# Patient Record
Sex: FEMALE | HISPANIC OR LATINO | Employment: FULL TIME | URBAN - METROPOLITAN AREA
[De-identification: names, ages, dates, MRNs, and addresses within clinical notes are randomized per-mention and may not be internally consistent; named-entity substitution may affect disease eponyms.]

---
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
## 2021-01-04 ENCOUNTER — VIRTUAL VISIT (OUTPATIENT)
Dept: PEDIATRICS | Facility: CLINIC | Age: 21
End: 2021-01-04
Payer: COMMERCIAL

## 2021-01-04 DIAGNOSIS — F32.1 CURRENT MODERATE EPISODE OF MAJOR DEPRESSIVE DISORDER WITHOUT PRIOR EPISODE (H): Primary | ICD-10-CM

## 2021-01-04 PROCEDURE — 99214 OFFICE O/P EST MOD 30 MIN: CPT | Mod: 95 | Performed by: PEDIATRICS

## 2021-01-04 ASSESSMENT — PATIENT HEALTH QUESTIONNAIRE - PHQ9: SUM OF ALL RESPONSES TO PHQ QUESTIONS 1-9: 12

## 2021-01-04 NOTE — PROGRESS NOTES
Amie Santiago is a 20 year old female who is being evaluated via a billable video visit.      How would you like to obtain your AVS? MyChart  If the video visit is dropped, the invitation should be resent by: Text to cell phone: [phone]  Will anyone else be joining your video visit? No      Video Start Time: 9:50   Combination of video and phone visit due to some internet problems    Assessment & Plan     Current moderate episode of major depressive disorder without prior episode (H)  In discussion with Amie, her symptoms are quite consistent with depression and her PHQ score is in the moderate range.  She is not having suicidal thoughts, but her depression is significantly her daily functioning.    We discussed counseling and medication, and Irene would like to try medication.  She may have access to student health when she goes back to college in person, and she's open to that.    - FLUoxetine (PROZAC) 20 MG capsule; Take 1 capsule (20 mg) by mouth daily    Iron deficiency:  This has been an issue in the past, will plan to recheck ferritin at next in person visit.                  40 minutes spent on the date of the encounter doing chart review, history and exam, documentation and further activities as noted above               Return in about 1 month (around 2/4/2021) for Medication Follow up.    Letty Echevarria MD  [facility]     Amie Santiago is a 20 year old female who presents to clinic today for the following health issues     HPI     Here today to discuss ongoing fatigue and ,malaise.  We talked through her past iron deficiency and she said she did start back up on iron supplement.    Irene brought up that she has also wondered if she might have depression--she has been filling very low energy, low mood, and lack of interest in her usual activities.  She has noticed this over the past several weeks, and we went through her PHQ together..   OT ACUTE Treatment Session    Pt seen on 4  nursing unit.                                                          Frequency Comments: XMWF (OBS)     Admitting complaint:: Urinary tract infection without hematuria, site unspecified [N39.0]  Chronic right-sided thoracic back pain [M54.6, G89.29]                                                                                         Precautions  Cardiac Precautions: Yes (sternal precautions as of 6/6/18) (07/23/18 1025)      ASSESSMENT:  Patient continues to state she is unable to complete toileting clean-up, bathing LB, dressing LB, stating these tasks will increase her pain. States her need to go back to sub acute facility from which she was admitted from.     Treatment today focused on progressing functional mobility, except reaching anterior/posterior LB ADLs/toileting.   Current overall ADL status is maximal assist  Current functional mobility for ADL and Instrumental-ADL tasks is supervision using 2 ww.  Patient  displays  fair progress demonstrated by self limiting behavior with reach and completion of ADLs..    Limitations at this time include cognitive deficits, pain, medical status and carryover of techniques taught from one session to the next. Denies pain this session but will also not complete LB ADLs.Patient will benefit from further skilled OT for continued training with above to help the patient meet goal of maximizing participation in LB ADLs, recommended toilet tongs, patient declined.  Discussed patient goal of sub acute rehabilitation, discharge planning options and therapy progress made to date with Patient.       RECOMMENDATIONS FOR DISCHARGE:  Recommendations for Discharge: OT: Sub-acute nursing home;Other (comment) (from sub acute, does not complete own pericares, clean-up ) (07/27/18 1342)    OT Identified Barriers to Discharge: currently refusing own perineal/buttock clean-up due to flank pain, declines adaptive equipment, toilet tongs,              Review of Systems         Objective         Vitals:  No vitals were obtained today due to virtual visit.    Physical Exam   GENERAL: Healthy, alert and no distress  EYES: Eyes grossly normal to inspection.  No discharge or erythema, or obvious scleral/conjunctival abnormalities.  RESP: No audible wheeze, cough, or visible cyanosis.  No visible retractions or increased work of breathing.    SKIN: Visible skin clear. No significant rash, abnormal pigmentation or lesions.  NEURO: Cranial nerves grossly intact.  Mentation and speech appropriate for age.  PSYCH: Mentation appears normal, affect normal/bright, judgement and insight intact, normal speech and appearance well-groomed.                Video-Visit Details    Type of service:  Video Visit    Video End Time:10:10    Originating Location (pt. Location): Home    Distant Location (provider location):  [facility]'S     Platform used for Video Visit: [employer]     insists on assistance, states she can't and that she needs to go back to sub acute      PT/OT Mobility Equipment for Discharge: Owns 4 wheeled walker and cane (07/26/18 0850)  PT/OT ADL Equipment for Discharge: Do not anticipate needs.  (07/24/18 1600)    Assistance needed when returning home:   Discussed with patient/caregiver who acknowledged understanding of current recommendations for safe home discharge plan. Based on current level of assistance, recommendations are: sub acute rehabilitation. Help to be provided by staff.    ICU Mobility Assesment (PERME):         PLAN: Continue skilled OT, including the following Treatment Interventions: ADL retraining;Functional transfer training;Endurance training;Cognitive reorientation;Patient/Family training;Equipment eval/education;Compensatory technique education;Fine motor coordination activities (07/27/18 1342)   Treatment Plan for Next Session: household distance to toilet, perineal area participation clean-up/bathing, LB ADLs          EDUCATION:   On this date, the patient was educated on see above and importance of self participation.    The response to education was: Needs reinforcement                                                    SUBJECTIVE:     Subjective: Patient agreed to session.  (07/27/18 1342)  Subjective/Objective Comments: RN, EMIGDIO. Patient up after ADLs ambulating in hallway.  (07/27/18 1342)    OBJECTIVE:Basic Lines: Capped IV (07/27/18 1342)  Safety Measures: Other (comment) (no alarms, call light in place, recliner) (07/27/18 1342)    RN reported Duran Fall Scale Score: 60       Last 24 hours of Functional Data     ADLs  Self Cares/ADL's  Grooming Assistance: Supervision;Standing at sink (07/27/18 1342)  Grooming/Oral Hygiene Deficit: Wash/dry hands;Other (Comment) (s/p toileting ) (07/27/18 1342)  Bathing Assistance: Maximal Assist (Max) (07/27/18 1342)  Bathing Deficit: Perineal area;Buttocks;Other (Comment);Increased time to complete (s/p  toileting ) (07/27/18 1342)  Lower Body Clothing Assistance: Maximal Assist (Max) (07/27/18 1342)  Footwear Assistance: Moderate Assist (Mod) (07/27/18 1342)  Lower Body Dressing Deficit: Don/doff R sock;Don/doff L sock;Thread RLE into underwear;Thread LLE into underwear;Pull up over hips;Requires assistive device for steadying;Increased time to complete (07/27/18 1342)  Toileting Assistance: Maximal Assist (Max) (07/27/18 1342)  Toileting Deficit: Perineal hygiene;Clothing management down;Clothing management up;Increased time to complete (decreased reach, doesn't report flank pain refuses to reach ) (07/27/18 1342)  Self Cares/ADL's Comments #2: limited only to reach anterior to perineal area, posterior to buttock, declining adaptive equipment, toilet tongs. Requires ADL assist. Following assisted toileting clean-up, standing at sink, able to wash hands, demonstrates tolerance, balance and safety, standing with 2 ww at sink, toilet (07/27/18 1342)    Household mobility  Household Mobility  Sit to Stand: Supervision (07/27/18 1342)  Stand to Sit: Supervision (07/27/18 1342)  Toilet Transfers: Supervision (07/27/18 1342)  Transfer Equipment: 2 ww, gait belt  (07/27/18 1342)  Sitting - Static: Independent (07/27/18 1342)  Sitting - Dynamic: Modified Independent (07/27/18 1342)  Standing - Static: Modified Independent (07/27/18 1342)  Standing - Dynamic: Supervision (07/27/18 1342)  Household Mobility Comments #1: Patient not limited to functional mobility using  2 ww, limited only to reach anterior to perineal area, posterior to buttock, declining adaptive equipment, toilet tongs. Requires ADL assist. Following assisted toileting clean-up, standing at sink, able to wash hands, additional ambulation in hallway 250 feet, no loss of balance and demonstrates tolerance/safety.  (07/27/18 1342)    Home Management       Tolerance  OT Activity Tolerance  Activity Tolerance: 1:1 Activity to rest (07/27/18 1342)  Activity  Tolerance Comments: limited self limiting  (07/27/18 1342)    Cognition       Patient's Personal Goal: Return to home, less pain.  (07/22/18 1255)    Therapy Goals:    Goals  Short Term Goals to Be Reviewed On: 07/29/18 (07/22/18 1255)  Short Term Goals Are The Same as Discharge Goals: Yes (07/22/18 1255)  Goal Agreement: Patient agrees with goals and treatment plan (07/22/18 1255)  Grooming Discharge Goal 1: Modif indep standing at the sink (07/22/18 1255)  Bathing Discharge Goal 1: Shower with modif indep (07/22/18 1255)  Dressing Discharge Goal 1: UB dressing with modif indep (07/22/18 1255)  Dressing Discharge Goal 2: LB dressing with modif indep (07/22/18 1255)  Toileting Discharge Goal 1: Modif indep (07/22/18 1255)  Home Setting Transfer Discharge Goal 1: Modif indep  (07/22/18 1255)        Total Treatment Time:  OT Time Spent: 40 minutes (07/27/18 1342)    See OT flowsheet for full details regarding the OT therapy provided.

## 2021-01-14 ENCOUNTER — TELEPHONE (OUTPATIENT)
Dept: PEDIATRICS | Facility: CLINIC | Age: 21
End: 2021-01-14

## 2021-01-14 NOTE — TELEPHONE ENCOUNTER
Could you please call Irene and get her scheduled for an in person visit with me sometime in early to mid February?  Follow up depression.    Thanks,    Letty

## 2021-02-08 ENCOUNTER — VIRTUAL VISIT (OUTPATIENT)
Dept: PEDIATRICS | Facility: CLINIC | Age: 21
End: 2021-02-08
Payer: COMMERCIAL

## 2021-02-08 DIAGNOSIS — F32.4 MAJOR DEPRESSIVE DISORDER WITH SINGLE EPISODE, IN PARTIAL REMISSION (H): ICD-10-CM

## 2021-02-08 PROCEDURE — 99213 OFFICE O/P EST LOW 20 MIN: CPT | Mod: 95 | Performed by: PEDIATRICS

## 2021-02-08 NOTE — PROGRESS NOTES
Irene is a 20 year old who is being evaluated via a billable video visit.      How would you like to obtain your AVS? MyChart  If the video visit is dropped, the invitation should be resent by: Send to e-mail at: [email or web address]  Will anyone else be joining your video visit? No      Video Start Time: 10:40  Assessment & Plan     Major depressive disorder with single episode, in partial remission (H)    Major depressive disorder with single episode, in partial remission (H)  Feels that the Prozac has been really helpful.  Energy is much improved, she's sleeping better and feels happier.  Also back to college and starting up with soccer again, which she really enjoys.    Will plan to continue current dose and have another visit in 2 to 3 months.  Advised that she connect with a counselor in student health at her college.              Letty Echevarria MD  Pager [phone]                  20 minutes spent on the date of the encounter doing chart review, history and exam, documentation and further activities as noted above               Return in about 2 months (around 4/8/2021) for Medication Follow up.    Letty Echevarria MD  [facility]    [facility]     Irene is a 20 year old who presents to clinic today for the following health issues     HPI       Medication Followup of prozac for depression    Taking Medication as prescribed: yes    Side Effects:  None    Medication Helping Symptoms:  yes     Started on Prozac about 4 weeks ago after several weeks of low energy, anhedonia, sleep disruption.    Irene notes that things are much improved, she feels the medication is really helping her to feel better with improved energy, mood, and sleep.        Review of Systems         Objective           Vitals:  No vitals were obtained today due to virtual visit.    Physical Exam   GENERAL: Healthy, alert and no distress  EYES: Eyes grossly normal to inspection.  No discharge or erythema, or  obvious scleral/conjunctival abnormalities.  RESP: No audible wheeze, cough, or visible cyanosis.  No visible retractions or increased work of breathing.    SKIN: Visible skin clear. No significant rash, abnormal pigmentation or lesions.  NEURO: Cranial nerves grossly intact.  Mentation and speech appropriate for age.  PSYCH: Mentation appears normal, affect normal/bright, judgement and insight intact, normal speech and appearance well-groomed.                Video-Visit Details    Type of service:  Video Visit    Video End Time:10:48 AM    Originating Location (pt. Location): Home    Distant Location (provider location):  [facility]     Platform used for Video Visit: [employer]

## 2021-02-08 NOTE — ASSESSMENT & PLAN NOTE
Feels that the Prozac has been really helpful.  Energy is much improved, she's sleeping better and feels happier.  Also back to college and starting up with soccer again, which she really enjoys.    Will plan to continue current dose and have another visit in 2 to 3 months.  Advised that she connect with a counselor in student health at her college.

## 2021-04-28 ENCOUNTER — VIRTUAL VISIT (OUTPATIENT)
Dept: PEDIATRICS | Facility: CLINIC | Age: 21
End: 2021-04-28
Payer: COMMERCIAL

## 2021-04-28 DIAGNOSIS — H57.9 DISCOLORATION OF EYE: Primary | ICD-10-CM

## 2021-04-28 DIAGNOSIS — F32.5 MAJOR DEPRESSIVE DISORDER WITH SINGLE EPISODE, IN FULL REMISSION (H): ICD-10-CM

## 2021-04-28 PROCEDURE — 99213 OFFICE O/P EST LOW 20 MIN: CPT | Mod: 95 | Performed by: PEDIATRICS

## 2021-04-28 ASSESSMENT — PATIENT HEALTH QUESTIONNAIRE - PHQ9
SUM OF ALL RESPONSES TO PHQ QUESTIONS 1-9: 0
5. POOR APPETITE OR OVEREATING: NOT AT ALL

## 2021-04-28 ASSESSMENT — ANXIETY QUESTIONNAIRES
2. NOT BEING ABLE TO STOP OR CONTROL WORRYING: NOT AT ALL
6. BECOMING EASILY ANNOYED OR IRRITABLE: NOT AT ALL
1. FEELING NERVOUS, ANXIOUS, OR ON EDGE: NOT AT ALL
5. BEING SO RESTLESS THAT IT IS HARD TO SIT STILL: NOT AT ALL
7. FEELING AFRAID AS IF SOMETHING AWFUL MIGHT HAPPEN: NOT AT ALL
IF YOU CHECKED OFF ANY PROBLEMS ON THIS QUESTIONNAIRE, HOW DIFFICULT HAVE THESE PROBLEMS MADE IT FOR YOU TO DO YOUR WORK, TAKE CARE OF THINGS AT HOME, OR GET ALONG WITH OTHER PEOPLE: NOT DIFFICULT AT ALL
3. WORRYING TOO MUCH ABOUT DIFFERENT THINGS: NOT AT ALL
GAD7 TOTAL SCORE: 0

## 2021-04-28 NOTE — PROGRESS NOTES
Irene is a 20 year old who is being evaluated via a billable video visit.      How would you like to obtain your AVS? MyChart  If the video visit is dropped, the invitation should be resent by: Text to cell phone: [phone]  Will anyone else be joining your video visit? No    Video Start Time: 3:50     Start 3:50, end 4:04    Assessment & Plan   Problem List Items Addressed This Visit        Behavioral    Major depressive disorder with single episode, in full remission (H)     Much improved after taking Prozac for a few months, getting back to college.  Stopped Prozac due to vivid dreams.  Feelilng much better now and would like to stay off of medication and continue to monitor symptoms.            Other    Discoloration of eye - Primary    Relevant Orders    EYE ADULT REFERRAL         Noted a dark spot on the white of her eye and would like to have it checked out.  She hasn't noticed it previously.                 Return in about 3 months (around 7/28/2021) for ADHD medication check.    Letty Echevarria MD  [facility]    [facility]   Irene is a 20 year old who presents for the following health issues     HPI     Depression Followup    How are you doing with your depression since your last visit? Worsened taking both Adderall and Prozac together, was noticing vivid dreams and though it might have been from the Prozac so stopped taking it and now feeling much better.    Are you having other symptoms that might be associated with depression? No    Have you had a significant life event?  No     Are you feeling anxious or having panic attacks?   Yes:  weird and off    Do you have any concerns with your use of alcohol or other drugs? No    Social History     Tobacco Use     Smoking status: Never Smoker     Smokeless tobacco: Never Used   Substance Use Topics     Alcohol use: Yes     Alcohol/week: 0.0 standard drinks     Drug use: No     PHQ 7/22/2019 1/4/2021 4/28/2021   PHQ-9 Total Score 0 12 0    Q9: Thoughts of better off dead/self-harm past 2 weeks Not at all Not at all Not at all     DAMIÁN-7 SCORE 2/27/2019 4/28/2021   Total Score 1 0         Suicide Assessment Five-step Evaluation and Treatment (SAFE-T)      How many servings of fruits and vegetables do you eat daily?  2-3    On average, how many sweetened beverages do you drink each day (Examples: soda, juice, sweet tea, etc.  Do NOT count diet or artificially sweetened beverages)?   0    How many days per week do you exercise enough to make your heart beat faster? 7    How many minutes a day do you exercise enough to make your heart beat faster? 60 or more    How many days per week do you miss taking your medication? 0        Review of Systems         Objective           Vitals:  No vitals were obtained today due to virtual visit.    Physical Exam   GENERAL: Healthy, alert and no distress  EYES: Eyes grossly normal to inspection.  No discharge or erythema, or obvious scleral/conjunctival abnormalities.  RESP: No audible wheeze, cough, or visible cyanosis.  No visible retractions or increased work of breathing.    SKIN: Visible skin clear. No significant rash, abnormal pigmentation or lesions.  NEURO: Cranial nerves grossly intact.  Mentation and speech appropriate for age.  PSYCH: Mentation appears normal, affect normal/bright, judgement and insight intact, normal speech and appearance well-groomed.                Video-Visit Details    Type of service:  Video Visit    Video End Time:4:04    Originating Location (pt. Location): Home    Distant Location (provider location):  [facility]     Platform used for Video Visit: [email or web address]

## 2021-04-29 ENCOUNTER — TELEPHONE (OUTPATIENT)
Dept: OPHTHALMOLOGY | Facility: CLINIC | Age: 21
End: 2021-04-29

## 2021-04-29 ASSESSMENT — ANXIETY QUESTIONNAIRES: GAD7 TOTAL SCORE: 0

## 2021-04-29 NOTE — TELEPHONE ENCOUNTER
Spoke to pt at 1630    Right eye discoloration on white part of eye    No eye pain    No vision changes    Noticed recently by friend    Pt in [facility] may 8th    Scheduled in continuity may 12th    Note to  to send new patient packet--patient has main clinic number if needs to make adjustments to scheduling      Joss Menard RN 4:32 PM 04/29/21        [facility] Call Center    Phone Message    May a detailed message be left on voicemail: no     Reason for Call: Question regarding specialist protocol  Please follow protocols- only utilize this documentation for questions or concerns that are not clear in the protocol.  Contact clinic directly to clarify question(s) via phone or Judobaby message.   Was Clinic Available: no  Question regarding protocol:  Discoloration of eye  Is there a referral for the requested specialist/specialty? yes  Name of referring provider: Letty Echevarria MD   Location of referring provider: [facility]      Action Taken: Message routed to:  [facility] [[id number]]    Travel Screening: Not Applicable

## 2021-05-05 NOTE — PATIENT INSTRUCTIONS
Try the short acting (5 mg Adderall) to help with homework after school or on weekends.   05-May-2021 12:05

## 2021-05-12 ENCOUNTER — OFFICE VISIT (OUTPATIENT)
Dept: OPHTHALMOLOGY | Facility: CLINIC | Age: 21
End: 2021-05-12
Attending: OPHTHALMOLOGY
Payer: COMMERCIAL

## 2021-05-12 DIAGNOSIS — H15.89: Primary | ICD-10-CM

## 2021-05-12 DIAGNOSIS — H18.011: Primary | ICD-10-CM

## 2021-05-12 DIAGNOSIS — H02.823 EPIDERMAL INCLUSION CYST OF EYELID, RIGHT: ICD-10-CM

## 2021-05-12 PROCEDURE — G0463 HOSPITAL OUTPT CLINIC VISIT: HCPCS

## 2021-05-12 PROCEDURE — 92002 INTRM OPH EXAM NEW PATIENT: CPT | Mod: GC | Performed by: STUDENT IN AN ORGANIZED HEALTH CARE EDUCATION/TRAINING PROGRAM

## 2021-05-12 ASSESSMENT — SLIT LAMP EXAM - LIDS: COMMENTS: NORMAL

## 2021-05-12 ASSESSMENT — VISUAL ACUITY
OD_SC: 20/20
OS_SC: 20/20
METHOD: SNELLEN - LINEAR
METHOD_MR: DEFERS

## 2021-05-12 ASSESSMENT — TONOMETRY
IOP_METHOD: ICARE
OD_IOP_MMHG: 15
OS_IOP_MMHG: 14

## 2021-05-12 ASSESSMENT — EXTERNAL EXAM - RIGHT EYE: OD_EXAM: NORMAL

## 2021-05-12 ASSESSMENT — CUP TO DISC RATIO
OD_RATIO: 0.1
OS_RATIO: 0.1

## 2021-05-12 ASSESSMENT — EXTERNAL EXAM - LEFT EYE: OS_EXAM: NORMAL

## 2021-05-12 ASSESSMENT — CONF VISUAL FIELD
OS_NORMAL: 1
OD_NORMAL: 1

## 2021-05-12 NOTE — NURSING NOTE
Chief Complaints and History of Present Illnesses   Patient presents with     Consult For     Chief Complaint(s) and History of Present Illness(es)     Consult For     Laterality: right eye    Onset: 2 weeks ago              Comments     Conj Lesion RE-Pt. states that her friend noticed a brown spot on the corner of RE about 2 weeks ago. No change in VA BE. No pain BE.   Shira Diaz COT 1:04 PM May 12, 2021

## 2021-05-12 NOTE — PROGRESS NOTES
I have confirmed the patient's and reviewed Past Medical History, Past Surgical History, Social History, Family History, Problem List, Medication List and agree with Tech note.    CC: Right eye brown spot on white part of eye    HPI: Amie Santiago is a 20 year old female who presents for evaluation of a right eye brown spot that her friend brought to her attention 3 weeks ago. It has been stable in size. No redness, tearing, vision changes, or eye pain.    POHx: left anisocoria   PMHx:  RAHUL, ADD, seasonal allergies  Family Hx: unknown (adopted)  Social Hx: Student at [employer] in [place]  Medications: Reviewed in EMR    Amie Santiago is a 20 year old female with the following diagnoses:   1. 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 Epidermal inclusion cyst of eyelid, right        1) Complexion melanosis, right eye:  2) Epidermal inclusion cyst, right medial lower lid  The patient has a 2.5 x 1.0 mm area of right eye temporal complexion melanosis and also a tiny medial right lower eyelid epidermal inclusion cyst.   No concerning features of either lesion. Pt was adopted from [facility].  - Reassurance and return precautions provided.     Pt had normal eye exam ~1 year ago with local eye provider. Saw Dr. Nogueira in 2018 as part of hearing deficit workup. Pt's eye exam was normal then w/o retinopathy or bone spicules, but did find incidental left anisocoria. No anisocoria on today's exam.    Patient disposition:   Return if changes are noted or symptoms develop. Otherwise continue regular eye exams with local eye provider or here if preferred.         Pete Sheldon MD  Department of Ophthalmology - PGY4      Teaching statement:  Complete documentation of historical and exam elements from today's encounter can be found in the full encounter summary report (not reduplicated in this progress note). I personally obtained the chief complaint(s) and history of present illness.  I  confirmed and edited as necessary the review of systems, past medical/surgical history, family history, social history, and examination findings as documented by others; and I examined the patient myself. I personally reviewed the relevant tests, images, and reports as documented above.     I formulated and edited as necessary the assessment and plan and discussed the findings and management plan with the patient and family.    Karis Greene MD  Comprehensive Ophthalmology & Ocular Pathology  Department of Ophthalmology and Visual Neurosciences  [email or web address]  Pager [phone]

## 2021-05-30 PROBLEM — F32.5 MAJOR DEPRESSIVE DISORDER WITH SINGLE EPISODE, IN FULL REMISSION (H): Status: ACTIVE | Noted: 2021-02-08

## 2021-05-30 PROBLEM — H57.9 DISCOLORATION OF EYE: Status: ACTIVE | Noted: 2021-05-30

## 2021-05-30 NOTE — ASSESSMENT & PLAN NOTE
Much improved after taking Prozac for a few months, getting back to college.  Stopped Prozac due to vivid dreams.  Feelilng much better now and would like to stay off of medication and continue to monitor symptoms.

## 2021-09-25 ENCOUNTER — HEALTH MAINTENANCE LETTER (OUTPATIENT)
Age: 21
End: 2021-09-25

## 2021-10-19 ENCOUNTER — OFFICE VISIT (OUTPATIENT)
Dept: URGENT CARE | Facility: URGENT CARE | Age: 21
End: 2021-10-19
Payer: COMMERCIAL

## 2021-10-19 VITALS
BODY MASS INDEX: 21.16 KG/M2 | DIASTOLIC BLOOD PRESSURE: 67 MMHG | HEIGHT: 62 IN | TEMPERATURE: 98.2 F | WEIGHT: 115 LBS | HEART RATE: 78 BPM | SYSTOLIC BLOOD PRESSURE: 118 MMHG | OXYGEN SATURATION: 100 %

## 2021-10-19 DIAGNOSIS — R07.0 THROAT PAIN: Primary | ICD-10-CM

## 2021-10-19 LAB
DEPRECATED S PYO AG THROAT QL EIA: NEGATIVE
GROUP A STREP BY PCR: NOT DETECTED

## 2021-10-19 PROCEDURE — 87651 STREP A DNA AMP PROBE: CPT | Performed by: NURSE PRACTITIONER

## 2021-10-19 PROCEDURE — 99213 OFFICE O/P EST LOW 20 MIN: CPT | Performed by: NURSE PRACTITIONER

## 2021-10-19 RX ORDER — LORATADINE 10 MG/1
10 TABLET ORAL DAILY
COMMUNITY
End: 2024-02-14

## 2021-10-19 ASSESSMENT — MIFFLIN-ST. JEOR: SCORE: 1249.89

## 2021-10-19 NOTE — PATIENT INSTRUCTIONS
Rapid strep test today is negative.   Your throat culture is pending. Urgent Care will call if positive results to start antibiotics at that time; No call if the culture is negative.  Drink plenty of fluids and rest.  May use salt water gargles- about 8 oz warm water with about 1 teaspoon salt  Sucrets and Cepacol spray are over the counter medications that numb the throat.  Over the counter pain relievers such as tylenol or ibuprofen may be used as needed.  Honey has been shown to be helpful in cough management and is soothing to a sore throat. May add to lemon tea.  Please follow up with primary care provider if not improving, worsening or new symptoms.

## 2021-10-19 NOTE — PROGRESS NOTES
"Chief Complaint   Patient presents with     Urgent Care     Pt in clinic to have eval for sore throat.     Throat Pain     SUBJECTIVE:  Amie Santiago is a 20 year old female presenting with sore throat and myalgias for 1 day. Symptoms are moderate and stable.    Past Medical History:   Diagnosis Date     Hoarseness      Nasal congestion      Sore throat      ferrous sulfate (SLO-FE) 142 (45 Fe) MG TBCR, Take 1 tablet (142 mg) by mouth daily  loratadine (CLARITIN) 10 MG tablet, Take 10 mg by mouth daily  multivitamin, therapeutic with minerals (MULTI-VITAMIN) TABS tablet, Take 1 tablet by mouth daily  VITAMIN D, CHOLECALCIFEROL, PO, Take by mouth daily  albuterol (VENTOLIN HFA) 108 (90 Base) MCG/ACT inhaler, Inhale 2 puffs into the lungs every 4 hours as needed for wheezing (can use 15 mintues before exercise to prevent shortness of breath) (Patient not taking: Reported on 8/19/2020)  amphetamine-dextroamphetamine (ADDERALL XR) 15 MG 24 hr capsule, Take 15 mg by mouth daily (Patient not taking: Reported on 10/19/2021)  ibuprofen (ADVIL/MOTRIN) 600 MG tablet, Take 600 mg by mouth every 6 hours as needed for moderate pain (Patient not taking: Reported on 10/19/2021)  order for DME, Equipment being ordered: left arm sling (Patient not taking: Reported on 5/10/2019)    No current facility-administered medications on file prior to visit.    Social History     Tobacco Use     Smoking status: Never Smoker     Smokeless tobacco: Never Used   Substance Use Topics     Alcohol use: Yes     Alcohol/week: 0.0 standard drinks     Allergies   Allergen Reactions     Gluten Meal      Review of Systems   All systems negative except for those listed above in HPI.    OBJECTIVE:   /67   Pulse 78   Temp 98.2  F (36.8  C) (Oral)   Ht 1.575 m (5' 2\")   Wt 52.2 kg (115 lb)   SpO2 100%   BMI 21.03 kg/m       Physical Exam  Constitutional:       General: She is not in acute distress.     Appearance: She is well-developed. " She is not ill-appearing, toxic-appearing or diaphoretic.   HENT:      Head: Normocephalic and atraumatic.      Nose: No congestion or rhinorrhea.      Mouth/Throat:      Pharynx: No oropharyngeal exudate or posterior oropharyngeal erythema (pink).   Eyes:      Conjunctiva/sclera: Conjunctivae normal.      Pupils: Pupils are equal, round, and reactive to light.   Cardiovascular:      Rate and Rhythm: Normal rate.   Pulmonary:      Effort: Pulmonary effort is normal. No respiratory distress.      Breath sounds: No stridor. No wheezing.   Musculoskeletal:         General: Normal range of motion.      Cervical back: Normal range of motion and neck supple.   Lymphadenopathy:      Cervical: No cervical adenopathy.   Skin:     General: Skin is warm.      Capillary Refill: Capillary refill takes less than 2 seconds.      Findings: No rash.   Neurological:      General: No focal deficit present.      Mental Status: She is alert and oriented to person, place, and time.   Psychiatric:         Mood and Affect: Mood normal.         Behavior: Behavior normal.       Results for orders placed or performed in visit on 10/19/21   Streptococcus A Rapid Screen w/Reflex to PCR - Clinic Collect     Status: Normal    Specimen: Throat; Swab   Result Value Ref Range    Group A Strep antigen Negative Negative     ASSESSMENT:    ICD-10-CM    1. Throat pain  R07.0 Streptococcus A Rapid Screen w/Reflex to PCR - Clinic Collect     PLAN:     Rapid strep test today is negative.   Your throat culture is pending. Urgent Care will call if positive results to start antibiotics at that time; No call if the culture is negative.  Drink plenty of fluids and rest.  May use salt water gargles- about 8 oz warm water with about 1 teaspoon salt  Sucrets and Cepacol spray are over the counter medications that numb the throat.  Over the counter pain relievers such as tylenol or ibuprofen may be used as needed.  Honey has been shown to be helpful in cough  management and is soothing to a sore throat. May add to lemon tea.  Please follow up with primary care provider if not improving, worsening or new symptoms.    Follow up with primary care provider with any problems, questions or concerns or if symptoms worsen or fail to improve. Patient agreed to plan and verbalized understanding.    PADMAJA [facility]

## 2022-01-14 ENCOUNTER — OFFICE VISIT (OUTPATIENT)
Dept: FAMILY MEDICINE | Facility: CLINIC | Age: 22
End: 2022-01-14
Payer: COMMERCIAL

## 2022-01-14 VITALS
BODY MASS INDEX: 19.57 KG/M2 | TEMPERATURE: 97.4 F | SYSTOLIC BLOOD PRESSURE: 101 MMHG | HEART RATE: 64 BPM | WEIGHT: 107 LBS | DIASTOLIC BLOOD PRESSURE: 72 MMHG | OXYGEN SATURATION: 100 %

## 2022-01-14 DIAGNOSIS — S06.0X0A CONCUSSION WITHOUT LOSS OF CONSCIOUSNESS, INITIAL ENCOUNTER: Primary | ICD-10-CM

## 2022-01-14 PROCEDURE — 99213 OFFICE O/P EST LOW 20 MIN: CPT | Performed by: FAMILY MEDICINE

## 2022-01-14 ASSESSMENT — PATIENT HEALTH QUESTIONNAIRE - PHQ9: SUM OF ALL RESPONSES TO PHQ QUESTIONS 1-9: 1

## 2022-01-14 NOTE — PATIENT INSTRUCTIONS
"  Patient Education   Concussion Discharge Instructions  You were seen today for signs of a concussion. The symptoms will vary, depending on the nature of your injury and your health. You may have: headache, confusion, nausea (feel sick to your stomach), vomiting (throwing up) and problems with memory, concentrating or sleep. You may feel dizzy, irritable, and tired.   Children and teens may need help from their parents, teachers and coaches to watch for symptoms as they recover.  Follow-up  It is important for you to see a doctor for follow-up care to see how you are recovering. Please see your primary doctor within the next 5 to 7 days. You may have also been referred to the Concussion  service. They will contact you and arrange a follow-up visit if needed. If you need help sooner, you may call them at [phone].  Warning signs  Call your doctor or come back to Emergency if you suddenly have any of these symptoms:    Headaches that get worse    Feeling more and more drowsy    You keep repeating yourself    Strange behavior    Seizures    Repeat vomiting (throwing up)    Trouble walking    Growing confusion    Feeling more irritable    Neck pain that gets worse    Slurred speech    Weakness or numbness    Loss of consciousness    Fluid or blood coming from ears or nose  Self-care    Get lots of rest and get enough sleep at night. Take daytime naps or rest if you feel tired.    Limit physical activity and \"thinking\" activities. These can make symptoms worse.  ? Physical activity includes gym, sports, weight training, running, exercise and heavy lifting.  ? Thinking activities include homework, class work, job-related work and screen time (phone, computer, tablet, TV and video games).    Stick to a healthy diet and drink lots of fluids.    As symptoms improve, you may slowly return to your daily activities. If symptoms get worse   or return, reduce your activity.    Know that it is normal to feel sad and " frustrated when you do not feel right and are less active.  Going back to work    Your care team will tell you when you are ready to return to work.    Limit the amount of work you do soon after your injury. This may speed healing. Take breaks if your symptoms get worse. You should also reduce your physical activity as well as activities that require a lot of thinking until you see your doctor.    You may need shorter work days and a lighter workload.    Avoid heavy lifting, working with machinery, driving and working at heights until your symptoms are gone or you are cleared by a doctor.  Returning to sports    Never return to play if you have any symptoms. A full recovery will reduce the chances of getting hurt again. Remember, it is better to miss one or two games than a whole season.    You should rest from all physical activity until you see your doctor. Generally, if all symptoms have completely cleared, your doctor can help guide you to slowly return to sports. If symptoms return or worsen, stop the activity and see your doctor.    Important: If you are in an organized sport and under age 18, you will need written consent from a healthcare provider before you return to sports. Typically, this will be your primary care or sports medicine doctor. Please make an appointment.  Going back to school    If you are still having symptoms, you may need extra help at school.    Tell your teachers and school nurse about your injury and symptoms. Ask them to watch for problems with learning, memory and concentrating. Symptoms may get worse when you do schoolwork, and you may become more irritable.    You may need shorter school days, a reduced workload, and to postpone testing.    Do not drive or take gym class (physical activity) until cleared by a doctor.    For informational purposes only. Not to replace the advice of your health care provider.   2009 [employer]. Used with permission.  "This form is adapted from the \"Heads Up: Brain Injury in Your Practice\" tool kit developed by the Centers for Disease Control and Prevention (CDC). All rights reserved. [facility] [facility]. [employer] 533743pg - Rev 03/17.       "

## 2022-01-14 NOTE — PROGRESS NOTES
Assessment & Plan     (S06.0X0A) Concussion without loss of consciousness, initial encounter  (primary encounter diagnosis)  Comment: mild, I do recommend brain rest, off school for the next week but   Plan: total brain rest (no screens, reading, moderate or high impact activities, or listening to music) for next 2-3 days, wear sunglasses outside, may go on gentle walks, light household chores. Add activities in slowly as tolerated. Work restrictions as below.    To Whom It May Concern:     Amie Santiago sustained a concussion on 1/11/2022, and was evaluated in clinic on 1/14/2022 .  She still has symptoms from this injury while at rest and will be unable to work until 1/17/22 and should return to work for maximum 4 hours on Monday, 1/17/22. If tolerates 4 hours on 1/17/2022, may work 6 hours on 1/18/2022, and may progress to 8 hours on 1/20/22        Please feel free to contact me at the number above with any questions or concerns.     Sincerely,           Fabiana Pedraza MD    No follow-ups on file.    Fabiana Pedraza MD  [facility] Bonilla is a 21 year old who presents for the following health issues  accompanied by her mother Zander.    HPI     Concern - Hit head 1/11 while playing hockey  Onset: 1/11 evening  Description: Constant headache since got hit in head, right side of jaw hurts and hasn't slept well since incident  Intensity: mild  Progression of Symptoms:  worsening  Accompanying Signs & Symptoms: Has had trouble sleeping  Previous history of similar problem: Yes had hit head before but never had a concussion  Precipitating factors:        Worsened by: looking at cell phone, being outside in sunlight  Therapies tried and outcome: Ibuprofen only on 1/11 - helped a little  PHQ 1/4/2021 4/28/2021 1/14/2022   PHQ-9 Total Score 12 0 1   Q9: Thoughts of better off dead/self-harm past 2 weeks Not at all Not at all Not at all        Review of  Systems   Constitutional, HEENT, cardiovascular, pulmonary, GI, , musculoskeletal, neuro, skin, endocrine and psych systems are negative, except as otherwise noted.      Objective    /72 (BP Location: Left arm, Patient Position: Sitting, Cuff Size: Adult Regular)   Pulse 64   Temp 97.4  F (36.3  C) (Temporal)   Wt 48.5 kg (107 lb)   LMP 12/14/2021 (Approximate)   SpO2 100%   BMI 19.57 kg/m    Body mass index is 19.57 kg/m .  Physical Exam   GENERAL: healthy, alert and no distress  EYES: Eyes grossly normal to inspection, PERRL and conjunctivae and sclerae normal  HENT: ear canals and TM's normal, nose and mouth without ulcers or lesions  NECK: no adenopathy, no asymmetry, masses, or scars and thyroid normal to palpation  RESP: lungs clear to auscultation - no rales, rhonchi or wheezes  CV: regular rate and rhythm, normal S1 S2, no S3 or S4, no murmur, click or rub, no peripheral edema and peripheral pulses strong  MS: no gross musculoskeletal defects noted, no edema  NEURO: Normal strength and tone, sensory exam grossly normal, mentation intact, cranial nerves 2-12 intact, DTR's normal and symmetric, gait normal including heel/toe/tandem walking, rapid alternating movements normal and finger to nose testing normal bilaterally.  BACK: no CVA tenderness, no paralumbar tenderness  PSYCH: mentation appears normal, affect normal/bright         on levaquin

## 2022-01-14 NOTE — LETTER
[facility] [address] [place] [address]  Phone: [phone]    January 14, 2022        To Whom It May Concern:    Amie Santiago sustained a concussion on 1/11/2022, and was evaluated in clinic on 1/14/2022 .  She still has symptoms from this injury while at rest and will be unable to work until 1/17/22 and should return to work for maximum 4 hours on Monday, 1/17/22. If tolerates 4 hours on 1/17/2022, may work 6 hours on 1/18/2022, and may progress to 8 hours on 1/20/22      Please feel free to contact me at the number above with any questions or concerns.    Sincerely,         Fabiana Pedraza MD        [place] state law requires qualified medical clearance for return to  participation following concussion.

## 2022-01-15 ENCOUNTER — HEALTH MAINTENANCE LETTER (OUTPATIENT)
Age: 22
End: 2022-01-15

## 2022-03-28 ENCOUNTER — OFFICE VISIT (OUTPATIENT)
Dept: PEDIATRICS | Facility: CLINIC | Age: 22
End: 2022-03-28
Payer: COMMERCIAL

## 2022-03-28 VITALS
HEART RATE: 75 BPM | WEIGHT: 108.8 LBS | DIASTOLIC BLOOD PRESSURE: 65 MMHG | TEMPERATURE: 97.6 F | SYSTOLIC BLOOD PRESSURE: 100 MMHG | HEIGHT: 62 IN | BODY MASS INDEX: 20.02 KG/M2

## 2022-03-28 DIAGNOSIS — F32.5 MAJOR DEPRESSIVE DISORDER WITH SINGLE EPISODE, IN FULL REMISSION (H): ICD-10-CM

## 2022-03-28 DIAGNOSIS — F90.0 ADHD, PREDOMINANTLY INATTENTIVE TYPE: Primary | ICD-10-CM

## 2022-03-28 DIAGNOSIS — Z00.00 ENCOUNTER FOR PREVENTATIVE ADULT HEALTH CARE EXAMINATION: ICD-10-CM

## 2022-03-28 DIAGNOSIS — R79.0 LOW FERRITIN: ICD-10-CM

## 2022-03-28 PROCEDURE — 82728 ASSAY OF FERRITIN: CPT | Performed by: PEDIATRICS

## 2022-03-28 PROCEDURE — 36415 COLL VENOUS BLD VENIPUNCTURE: CPT | Performed by: PEDIATRICS

## 2022-03-28 PROCEDURE — 82306 VITAMIN D 25 HYDROXY: CPT | Performed by: PEDIATRICS

## 2022-03-28 PROCEDURE — 99214 OFFICE O/P EST MOD 30 MIN: CPT | Mod: 25 | Performed by: PEDIATRICS

## 2022-03-28 PROCEDURE — 99395 PREV VISIT EST AGE 18-39: CPT | Performed by: PEDIATRICS

## 2022-03-28 RX ORDER — DEXTROAMPHETAMINE SACCHARATE, AMPHETAMINE ASPARTATE MONOHYDRATE, DEXTROAMPHETAMINE SULFATE AND AMPHETAMINE SULFATE 3.75; 3.75; 3.75; 3.75 MG/1; MG/1; MG/1; MG/1
15 CAPSULE, EXTENDED RELEASE ORAL DAILY
Qty: 30 CAPSULE | Refills: 0 | Status: SHIPPED | OUTPATIENT
Start: 2022-04-28 | End: 2022-05-28

## 2022-03-28 RX ORDER — ESCITALOPRAM OXALATE 5 MG/1
5 TABLET ORAL DAILY
Qty: 60 TABLET | Refills: 0 | Status: SHIPPED | OUTPATIENT
Start: 2022-03-28 | End: 2022-05-25

## 2022-03-28 RX ORDER — DEXTROAMPHETAMINE SACCHARATE, AMPHETAMINE ASPARTATE, DEXTROAMPHETAMINE SULFATE AND AMPHETAMINE SULFATE 1.25; 1.25; 1.25; 1.25 MG/1; MG/1; MG/1; MG/1
5 TABLET ORAL 2 TIMES DAILY
Qty: 30 TABLET | Refills: 0 | Status: SHIPPED | OUTPATIENT
Start: 2022-03-28 | End: 2023-05-09

## 2022-03-28 RX ORDER — DEXTROAMPHETAMINE SACCHARATE, AMPHETAMINE ASPARTATE, DEXTROAMPHETAMINE SULFATE AND AMPHETAMINE SULFATE 1.25; 1.25; 1.25; 1.25 MG/1; MG/1; MG/1; MG/1
5 TABLET ORAL 2 TIMES DAILY
Qty: 30 TABLET | Refills: 0 | Status: SHIPPED | OUTPATIENT
Start: 2022-04-28 | End: 2023-03-22

## 2022-03-28 RX ORDER — DEXTROAMPHETAMINE SACCHARATE, AMPHETAMINE ASPARTATE MONOHYDRATE, DEXTROAMPHETAMINE SULFATE AND AMPHETAMINE SULFATE 3.75; 3.75; 3.75; 3.75 MG/1; MG/1; MG/1; MG/1
15 CAPSULE, EXTENDED RELEASE ORAL DAILY
Qty: 30 CAPSULE | Refills: 0 | Status: SHIPPED | OUTPATIENT
Start: 2022-05-29 | End: 2022-06-28

## 2022-03-28 RX ORDER — DEXTROAMPHETAMINE SACCHARATE, AMPHETAMINE ASPARTATE MONOHYDRATE, DEXTROAMPHETAMINE SULFATE AND AMPHETAMINE SULFATE 3.75; 3.75; 3.75; 3.75 MG/1; MG/1; MG/1; MG/1
15 CAPSULE, EXTENDED RELEASE ORAL DAILY
Qty: 30 CAPSULE | Refills: 0 | Status: SHIPPED | OUTPATIENT
Start: 2022-03-28 | End: 2022-04-27

## 2022-03-28 ASSESSMENT — PATIENT HEALTH QUESTIONNAIRE - PHQ9
SUM OF ALL RESPONSES TO PHQ QUESTIONS 1-9: 8
SUM OF ALL RESPONSES TO PHQ QUESTIONS 1-9: 8
10. IF YOU CHECKED OFF ANY PROBLEMS, HOW DIFFICULT HAVE THESE PROBLEMS MADE IT FOR YOU TO DO YOUR WORK, TAKE CARE OF THINGS AT HOME, OR GET ALONG WITH OTHER PEOPLE: NOT DIFFICULT AT ALL

## 2022-03-28 NOTE — PATIENT INSTRUCTIONS
For ADHD:    Let's go back to the Adderall XR 15 mg strength in the morning.  Then, add the short version of Adderall 5 mg strength at lunch time.    If you are still noticing trouble with sleep let me know and we can do additional adjustments.    For Depression:    Keep working with your therapist, I'm glad that has been a good help.  Start the medication called escitalopram 5 mg daily.  The brand name of this medicine is call Lexapro.    We checked your ferritin (iron level) and Vitamin D today.  I'll send you a [employer] message with information about those lab results.
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
## 2022-03-29 LAB
DEPRECATED CALCIDIOL+CALCIFEROL SERPL-MC: 21 UG/L (ref 20–75)
FERRITIN SERPL-MCNC: 9 NG/ML (ref 12–150)

## 2022-03-29 ASSESSMENT — PATIENT HEALTH QUESTIONNAIRE - PHQ9: SUM OF ALL RESPONSES TO PHQ QUESTIONS 1-9: 8

## 2022-03-30 PROBLEM — R06.02 SOB (SHORTNESS OF BREATH): Status: RESOLVED | Noted: 2019-05-13 | Resolved: 2022-03-30

## 2022-03-30 NOTE — ASSESSMENT & PLAN NOTE
Would like level rechecked today.  Follows a partial vegetarian/vegan diet.  May benefit from another round of iron supplementation.

## 2022-03-30 NOTE — ASSESSMENT & PLAN NOTE
Has noted worse low mood and lack of energy, enthuaism over past several weeks.  Working with a therapist which is helping, but would like to do another trial of medication.  Since Prozac caused intense and vivid dreams, will try escitalopram this time, starting at a low dose.  Follow up in 6 weeks for med check.

## 2022-04-02 DIAGNOSIS — E55.9 VITAMIN D DEFICIENCY: ICD-10-CM

## 2022-04-02 DIAGNOSIS — R79.0 LOW FERRITIN: Primary | ICD-10-CM

## 2022-04-02 RX ORDER — SWAB
1 SWAB, NON-MEDICATED MISCELLANEOUS DAILY
Qty: 90 CAPSULE | Refills: 3 | Status: SHIPPED | OUTPATIENT
Start: 2022-04-02 | End: 2023-05-09

## 2022-05-23 NOTE — PROGRESS NOTES
"  Assessment & Plan   Problem List Items Addressed This Visit        Behavioral    Major depressive disorder with single episode, in full remission (H)     The lexapro seems to be really helping--mood is much improved, more excited about summer job and connecting with friends.  Plan to continue Lexapro at current dose, and follow up in about 3 months.           Relevant Medications    escitalopram (LEXAPRO) 5 MG tablet                      Return in about 3 months (around 8/25/2022) for Medication Follow up.    Letty Echevarria MD  [facility]    [facility]   Irene is a 21 year old who presents for the following health issues     History of Present Illness       Reason for visit:  Medication    She eats 2-3 servings of fruits and vegetables daily.She consumes 0 sweetened beverage(s) daily.She exercises with enough effort to increase her heart rate 60 or more minutes per day.  She exercises with enough effort to increase her heart rate 4 days per week.   She is taking medications regularly.    Today's PHQ-9         PHQ-9 Total Score: 1    PHQ-9 Q9 Thoughts of better off dead/self-harm past 2 weeks :   Not at all    How difficult have these problems made it for you to do your work, take care of things at home, or get along with other people: Not difficult at all     Here to follow up depression--things are going much better since starting the Lexapro.  She's happy with the medication and would like to continue it for now.  Looking forward to summer job in [employer].          Review of Systems         Objective    Temp 98.1  F (36.7  C) (Oral)   Ht 5' 1.93\" (1.573 m)   Wt 107 lb (48.5 kg)   BMI 19.62 kg/m    Body mass index is 19.62 kg/m .  Physical Exam   GENERAL: healthy, alert and no distress  EYES: Eyes grossly normal to inspection, PERRL and conjunctivae and sclerae normal  HENT: ear canals and TM's normal, nose and mouth without ulcers or lesions  NECK: no adenopathy, no asymmetry, " masses, or scars and thyroid normal to palpation  RESP: lungs clear to auscultation - no rales, rhonchi or wheezes  CV: regular rate and rhythm, normal S1 S2, no S3 or S4, no murmur, click or rub, no peripheral edema and peripheral pulses strong  MS: no gross musculoskeletal defects noted, no edema  SKIN: no suspicious lesions or rashes  NEURO: Normal strength and tone, mentation intact and speech normal  PSYCH: mentation appears normal, affect normal/bright

## 2022-05-25 ENCOUNTER — OFFICE VISIT (OUTPATIENT)
Dept: PEDIATRICS | Facility: CLINIC | Age: 22
End: 2022-05-25
Payer: COMMERCIAL

## 2022-05-25 VITALS — TEMPERATURE: 98.1 F | WEIGHT: 107 LBS | BODY MASS INDEX: 19.69 KG/M2 | HEIGHT: 62 IN

## 2022-05-25 DIAGNOSIS — F32.5 MAJOR DEPRESSIVE DISORDER WITH SINGLE EPISODE, IN FULL REMISSION (H): ICD-10-CM

## 2022-05-25 PROCEDURE — 99213 OFFICE O/P EST LOW 20 MIN: CPT | Performed by: PEDIATRICS

## 2022-05-25 RX ORDER — ESCITALOPRAM OXALATE 5 MG/1
5 TABLET ORAL DAILY
Qty: 90 TABLET | Refills: 0 | Status: SHIPPED | OUTPATIENT
Start: 2022-05-25 | End: 2022-10-18

## 2022-05-25 ASSESSMENT — PATIENT HEALTH QUESTIONNAIRE - PHQ9
SUM OF ALL RESPONSES TO PHQ QUESTIONS 1-9: 1
SUM OF ALL RESPONSES TO PHQ QUESTIONS 1-9: 1
10. IF YOU CHECKED OFF ANY PROBLEMS, HOW DIFFICULT HAVE THESE PROBLEMS MADE IT FOR YOU TO DO YOUR WORK, TAKE CARE OF THINGS AT HOME, OR GET ALONG WITH OTHER PEOPLE: NOT DIFFICULT AT ALL

## 2022-07-06 NOTE — ASSESSMENT & PLAN NOTE
The lexapro seems to be really helping--mood is much improved, more excited about summer job and connecting with friends.  Plan to continue Lexapro at current dose, and follow up in about 3 months.

## 2022-09-15 ENCOUNTER — VIRTUAL VISIT (OUTPATIENT)
Dept: PEDIATRICS | Facility: CLINIC | Age: 22
End: 2022-09-15
Payer: COMMERCIAL

## 2022-09-15 DIAGNOSIS — J02.9 ACUTE PHARYNGITIS, UNSPECIFIED ETIOLOGY: Primary | ICD-10-CM

## 2022-09-15 LAB
DEPRECATED S PYO AG THROAT QL EIA: NEGATIVE
GROUP A STREP BY PCR: NOT DETECTED

## 2022-09-15 PROCEDURE — 87651 STREP A DNA AMP PROBE: CPT | Performed by: PEDIATRICS

## 2022-09-15 PROCEDURE — 99213 OFFICE O/P EST LOW 20 MIN: CPT | Mod: 95 | Performed by: PEDIATRICS

## 2022-09-15 NOTE — PROGRESS NOTES
Called patient and gave central scheduling number as I do not feel comfortable scheduling other clinics especially for childrens.  Michelle MCKEON  Patient Representative  [phone]

## 2022-09-15 NOTE — PROGRESS NOTES
SUBJECTIVE:  Amie Santiago is a 21 year old female who presents with the following concerns;              Symptoms: cc Present Absent Comment   Fever/Chills   x    Fatigue  x     Muscle Aches  x     Eye Irritation   x    Sneezing   x    Nasal Da/Drg   x    Sinus Pressure/Pain   x    Loss of smell   x    Dental pain  x     Sore Throat   x    Swollen Glands   x    Ear Pain/Fullness  x     Cough   x    Wheeze   x    Chest Pain   x    Shortness of breath   x    Rash   x    Other   x      Symptom duration:  2 days   Sympom severity:  mild   Treatments tried:  none   Contacts:  yes       Complaining of sore throat for 3 days, no fever, hurts to swallow but is able to, occ cough but no fever, no joint pain, no rash, no stomachache, did covid test at home and was negative, concerned for strep +sick contacts    OBJECTIVE:  Vitals not taken due to it being a video visit  General: NAD , well appearing  Throat - +erythema of tonsils but no exudates  Start time 9AM, end time 9:07 AM, patient was home, provider in clinic using Win

## 2022-10-11 ENCOUNTER — OFFICE VISIT (OUTPATIENT)
Dept: FAMILY MEDICINE | Facility: CLINIC | Age: 22
End: 2022-10-11
Payer: COMMERCIAL

## 2022-10-11 DIAGNOSIS — J01.00 ACUTE MAXILLARY SINUSITIS, RECURRENCE NOT SPECIFIED: ICD-10-CM

## 2022-10-11 DIAGNOSIS — H66.001 RIGHT ACUTE SUPPURATIVE OTITIS MEDIA: Primary | ICD-10-CM

## 2022-10-11 PROCEDURE — 99214 OFFICE O/P EST MOD 30 MIN: CPT | Performed by: FAMILY MEDICINE

## 2022-10-11 RX ORDER — AMOXICILLIN 500 MG/1
500 CAPSULE ORAL 2 TIMES DAILY
Qty: 20 CAPSULE | Refills: 0 | Status: SHIPPED | OUTPATIENT
Start: 2022-10-11 | End: 2022-10-21

## 2022-10-11 NOTE — LETTER
[facility]  [address]  [address]  [place] [address]  [phone]          October 11, 2022    RE:  Amie Santiago                                                                                                                                                       [address]  [place] [address]            To whom it may concern:    Amie Santiago is under my professional care for    Right acute suppurative otitis media and Acute maxillary sinusitis   She needs to get extra rest over the next week      Sincerely,        Manuel Pittman MD

## 2022-10-11 NOTE — PROGRESS NOTES
Assessment & Plan     Right acute suppurative otitis media  Advil/ rest  - amoxicillin (AMOXIL) 500 MG capsule  Dispense: 20 capsule; Refill: 0    Acute maxillary sinusitis, recurrence not specified  Zyrtec/ flonase prn               Note for school  See Patient Instructions    Return in about 3 months (around 1/11/2023) for in person, Routine preventive.    Manuel Pittman MD  [facility] MICAH Bonilla is a 21 year old, presenting for the following health issues:  Ear Problem      History of Present Illness       Reason for visit:  My ear hurts and this id the 4th week in a row sourav bennett suck  Symptom onset:  3-4 weeks ago  Symptoms include:  Coughing snd running nose  Symptom intensity:  Moderate  Symptom progression:  Worsening  Had these symptoms before:  Yes  Has tried/received treatment for these symptoms:  Yes  Previous treatment was successful:  No    She eats 2-3 servings of fruits and vegetables daily.She consumes 0 sweetened beverage(s) daily.She exercises with enough effort to increase her heart rate 30 to 60 minutes per day.  She exercises with enough effort to increase her heart rate 5 days per week. She is missing 7 dose(s) of medications per week.             Review of Systems   Constitutional, HEENT, cardiovascular, pulmonary, gi and gu systems are negative, except as otherwise noted.      Objective    There were no vitals taken for this visit.  There is no height or weight on file to calculate BMI.  Physical Exam   GENERAL: alert and mild distress  HENT: normal cephalic/atraumatic, right ear: erythematous and bulging membrane, left ear: clear effusion, nose and mouth without ulcers or lesions, nasal mucosa edematous , rhinorrhea yellow and green, oral mucous membranes moist and sinuses: maxillary tenderness on right  NECK: bilateral anterior cervical adenopathy, no asymmetry, masses, or scars and thyroid normal to palpation  RESP: lungs clear to auscultation  - no rales, rhonchi or wheezes  CV: regular rate and rhythm, normal S1 S2, no S3 or S4, no murmur, click or rub, no peripheral edema and peripheral pulses strong  ABDOMEN: soft, nontender, no hepatosplenomegaly, no masses and bowel sounds normal  MS: no gross musculoskeletal defects noted, no edema    Virtual Visit on 09/15/2022   Component Date Value Ref Range Status     Group A Strep antigen 09/15/2022 Negative  Negative Final     Group A strep by PCR 09/15/2022 Not Detected  Not Detected Final

## 2022-10-18 ENCOUNTER — MYC REFILL (OUTPATIENT)
Dept: PEDIATRICS | Facility: CLINIC | Age: 22
End: 2022-10-18

## 2022-10-18 DIAGNOSIS — F32.5 MAJOR DEPRESSIVE DISORDER WITH SINGLE EPISODE, IN FULL REMISSION (H): ICD-10-CM

## 2022-10-18 RX ORDER — ESCITALOPRAM OXALATE 5 MG/1
5 TABLET ORAL DAILY
Qty: 90 TABLET | Refills: 0 | Status: SHIPPED | OUTPATIENT
Start: 2022-10-18 | End: 2023-05-09

## 2022-10-18 NOTE — TELEPHONE ENCOUNTER
"Requested Prescriptions   Pending Prescriptions Disp Refills     escitalopram (LEXAPRO) 5 MG tablet 90 tablet 0     Sig: Take 1 tablet (5 mg) by mouth daily       SSRIs Protocol Passed - 10/18/2022  8:39 AM        Passed - PHQ-9 score less than 5 in past 6 months     Please review last PHQ-9 score.           Passed - Medication is active on med list        Passed - Patient is age 18 or older        Passed - No active pregnancy on record        Passed - No positive pregnancy test in last 12 months        Passed - Recent (6 mo) or future (30 days) visit within the authorizing provider's specialty     Patient had office visit in the last 6 months or has a visit in the next 30 days with authorizing provider or within the authorizing provider's specialty.  See \"Patient Info\" tab in inbasket, or \"Choose Columns\" in Meds & Orders section of the refill encounter.               Prescription approved per [facility] Refill Protocol.  Adia Robles RN    Last visit 5-25-22 notes:    Return in about 3 months (around 8/25/2022) for Medication Follow up.     Letty Echevarria MD  [facility]    Sent [employer] message letting Irene know that she is due for clinic appt Adia Robles RN      "

## 2022-11-28 ENCOUNTER — TELEPHONE (OUTPATIENT)
Dept: PSYCHIATRY | Facility: CLINIC | Age: 22
End: 2022-11-28

## 2022-11-28 NOTE — TELEPHONE ENCOUNTER
PSYCHIATRY CLINIC PHONE INTAKE     SERVICES REQUESTED / INTERESTED IN          Med Management    Presenting Problem and Brief History                              What would you like to be seen for? (brief description):    Nov 4th pt went to hospital because of psychotic symptoms. Symptoms went away without medical intervention, except for the anti depressant she'd been on for a couple of years. Pt was delusional and parents called police for wellness check. Pt does have some marijuana use history, but not extreme. No other ideas for what may have caused delusional episode. Pt was adult when she started treatment for depression, so mom isn't positive about details but that's all mom is aware of for medical history. Pt is adopted, so family history of mental illness unclear.    Have you received a mental health diagnosis? Yes   Which one (s):  Depression, pcp in 2020  Is there any history of developmental delay?  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  Yes            Who / month last seen:  Just started with new therapist - [facility]  Do you have mental health records elsewhere?  Yes  Will you sign a release so we can obtain them?  Yes    Have you ever been hospitalized for psychiatric reasons?  Yes  Describe:  Nov 2022 at [facility] first and only    Do you have current thoughts of self-harm?  No  Pt sent a text to someone at one point that raised concerns, but otherwise no  Do you currently have thoughts of harming others?  No  Paranoia at time of hospitalization was feared to include this     Substance Use History     Do you have any history of alcohol / illicit drug use?  Yes  Describe:  Marijuana use, alcohol use, mom doesn't think they're addictions. Mom guessing that there's been increased use of marijuana the last couple months, does not know if it's daily though  Have you ever received treatment for this?  No    Describe:   na     Social History     Who is the patient's a guardian?  self    Name / number: self  Have you had an ACT team in last 12 months?  No  Describe: na   OK to leave a detailed voicemail?  Yes        Medical/ Surgical History                                   Patient Active Problem List   Diagnosis     Adopted 2/07 from [facility]     ADHD, predominantly inattentive type     Seasonal allergic rhinitis     Hearing deficit, left     Low ferritin     Acute nonintractable headache, unspecified headache type     Major depressive disorder with single episode, in full remission (H)     Discoloration of eye     Concussion without loss of consciousness, initial encounter          Medications             Current Outpatient Medications   Medication Sig Dispense Refill     albuterol (VENTOLIN HFA) 108 (90 Base) MCG/ACT inhaler Inhale 2 puffs into the lungs every 4 hours as needed for wheezing (can use 15 mintues before exercise to prevent shortness of breath) (Patient not taking: Reported on 9/15/2022) 8.5 g 1     amphetamine-dextroamphetamine (ADDERALL XR) 15 MG 24 hr capsule Take 15 mg by mouth daily       amphetamine-dextroamphetamine (ADDERALL XR) 20 MG 24 hr capsule Take 1 capsule (20 mg) by mouth daily 30 capsule 0     amphetamine-dextroamphetamine (ADDERALL) 5 MG tablet Take 1 tablet (5 mg) by mouth 2 times daily 30 tablet 0     amphetamine-dextroamphetamine (ADDERALL) 5 MG tablet Take 1 tablet (5 mg) by mouth 2 times daily (Patient not taking: Reported on 9/15/2022) 30 tablet 0     escitalopram (LEXAPRO) 5 MG tablet Take 1 tablet (5 mg) by mouth daily 90 tablet 0     ferrous sulfate (SLO-FE) 142 (45 Fe) MG CR tablet Take 1 tablet (142 mg) by mouth daily 180 tablet 1     ibuprofen (ADVIL/MOTRIN) 600 MG tablet Take 600 mg by mouth every 6 hours as needed for moderate pain       loratadine (CLARITIN) 10 MG tablet Take 10 mg by mouth daily       multivitamin w/minerals (THERA-VIT-M) tablet Take 1 tablet by mouth daily        order for DME Equipment being ordered: left arm sling (Patient not taking: Reported on 9/15/2022) 1 Device 0     VITAMIN D, CHOLECALCIFEROL, PO Take by mouth daily (Patient not taking: No sig reported)       vitamin D3 (CHOLECALCIFEROL) 10 MCG (400 UNIT) capsule Take 1 capsule (400 Units) by mouth daily (Patient not taking: No sig reported) 90 capsule 3         DISPOSITION     Phone screen completed with mom, per verbal permission from patient. Scheduled for AGE with resident clinic     Marixa Whitman

## 2022-12-06 ENCOUNTER — TELEPHONE (OUTPATIENT)
Dept: PSYCHIATRY | Facility: CLINIC | Age: 22
End: 2022-12-06

## 2022-12-06 ENCOUNTER — VIRTUAL VISIT (OUTPATIENT)
Dept: PSYCHIATRY | Facility: CLINIC | Age: 22
End: 2022-12-06
Payer: COMMERCIAL

## 2022-12-06 DIAGNOSIS — F29 PSYCHOSIS, UNSPECIFIED PSYCHOSIS TYPE (H): Primary | ICD-10-CM

## 2022-12-06 NOTE — PROGRESS NOTES
"[facility] Clinician Phone Screen  A Part of the [facility] First Episode of Psychosis Program    Patient: Amie Snatiago (2000, 22 year old)     MRN: [id number]  Date:  12/06/22  Clinician: Gabby Quarles     Length of Actual Contact: Start Time: 1100; End Time: 1130    Reviewed limits to confidentiality: Yes    Phone screen completed with:  Irene; Relation to the patient: self  If we move forward with scheduling appointments, who should we coordinate appointments with? Mom, Phone: [phone]  Is it OK to leave a detailed voicemail? Yes  If we move forward with scheduling appointments via video, where would you like the link sent? Phone, [phone]    Demographics:   What is patient's phone number: [phone] (home)   Patient's Address?  [address] [place] [facility] [address]  Patient's Email Address?  [email or web address]    If age 18 or older, does the adult patient consent to this referral and is the patient willing to attend appointments? Yes    Diagnostic Information:  Briefly, in a few sentences, what would you/the patient like to be seen for?  \"Feels better now\" but was depressed. Was recently hospitalized for \"stress\" and did not endorse any specific psychosis but answered yes to several of the following questions.    Have you/the patient experienced symptoms of psychosis?  Possibly  If yes, for how long and please describe? Unsure, but sounds like perhaps one year  In particular, are you/the patient experiencing/have experienced any of the following?   -Changes in thinking (odd ideas, grandiosity, suspiciousness, difficulty concentrating): Yes difficulty concentrating  -Changes in perception (auditory/visual/tactile/olfactory abnormalities): No  -Changes in speech (disorganized communication, tangential speech): Yes  -Emotional changes (depression, mood swings, irritability, flat affect): Yes  -Dramatic reduction of overall functioning: No    What mental health diagnosis(es) have " "you/the patient received in the past?   Depression    In particular, have you/the patient ever been diagnosed with:   -Autism spectrum disorder: No   -Borderline personality disorder: No    Any history of developmental delays?  Yes    If yes, please describe: Didn't learn how to fully speak English until age [age]. Was adopted from [facility].    Are any of the following applicable to the patient?   -IQ below 70? No  -Non-verbal due to developmental delays? No  -Living in a group home because of developmental disability? No  -Has a guardian because of a developmental disability? No    Service History:   Are you/the patient taking antipsychotics or have you/the patient taken antipsychotics in the past? No           Was recently hospitalized for \"stress.\"    Are you/the patient seeing any mental health providers currently? Yes              If yes, who/where? Has a therapist and psychiatrist with [facility] in [place]    Specifically, have you/the patient had an ACT team in the past?  No    Have you been enrolled in a first episode psychosis outpatient program before, such as [facility], HOPE Program at [facility], or at the [facility] [place] in [place]? No    Any current thoughts of harming yourself or others? No    What services are you/the patient interested in receiving in our clinics?   Medication management: No   Individual therapy: Yes   Family therapy: Yes   Group therapy: No   Work/school support: No    Research:  If talking with the patient directly, would you be interested in learning more about research opportunities you may qualify? If so, we can connect you with a team member for more information. Yes     Gabby Quarles    "

## 2022-12-06 NOTE — TELEPHONE ENCOUNTER
On 11/29/22 the patient signed an MARCEL authorizing medical records to be exchanged between [facility] and [facility] for the purpose of continuing care. The request was faxed to our medical records department at [phone] and sent to scanning on December 6, 2022 and held a copy in Psychiatry until scanning is confirmed. Freddy Gunn, EMT

## 2022-12-12 ENCOUNTER — TELEPHONE (OUTPATIENT)
Dept: PSYCHIATRY | Facility: CLINIC | Age: 22
End: 2022-12-12
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
## 2022-12-14 ENCOUNTER — TRANSFERRED RECORDS (OUTPATIENT)
Dept: HEALTH INFORMATION MANAGEMENT | Facility: CLINIC | Age: 22
End: 2022-12-14

## 2022-12-22 ENCOUNTER — VIRTUAL VISIT (OUTPATIENT)
Dept: PSYCHIATRY | Facility: CLINIC | Age: 22
End: 2022-12-22
Payer: COMMERCIAL

## 2022-12-22 DIAGNOSIS — F29 PSYCHOSIS, UNSPECIFIED PSYCHOSIS TYPE (H): Primary | ICD-10-CM

## 2022-12-22 NOTE — PROGRESS NOTES
[facility]: Psychological Evaluation Testing Note    Patient Name: Amie Santiago   MRN: [id number]   : 2000   Date of Testin2022   Originating Location (patient location): [facility]  Distant Location (provider location): remote location, located in [place], [place], using appropriate privacy considerations and procedures  Start time: 10:20am  Stop Time: 11:10am     *initially believed to be a no-show, worked through technical barriers and met later than planned*    Attendees: Amie Santiago  : FREDDIE    Identifying information/Reason for Referral  Amie is a 22 year old y.o. female with a history of psychosis symptoms who was referred to complete the assessment battery by their treatment team. The purpose of this psychological evaluation was to provide information about Amie's social, emotional, and cognitive functioning, to assist with diagnostic clarification and to guide their treatment planning. Results of the following assessment are to be used in conjunction with a following diagnostic assessment complete by another member of the [facility] clinic.     Summary of services/procedures  Amie was administered a psychological test battery and the mini international neuropsychiatric interview.     Test Administered/Results  Demographics and Background Intake  [place] Assessment of Need - Short Appraisal Scheduled (CANSAS)  Education Intake  Employment Intake  Legal Involvement and Related Intake  Hospitalization Intake  Suicidality Intake  Family Involvement Intake  Medication and Medication Side Effects Intake  Brief Adherence Rating Scale (BARS) - Clinician Intake  Duration of Untreated Psychosis (DUP) Intake  Diagnosis Intake  Health Intake  Mini International Neuropsychiatric Interview Version 7.0.2    Assessments Results/Information collected:  CANSAS:  Accommodation:  What kind of place do you live in? No problem  Food  Do you get  enough to eat? No problem  Looking after the home  Are you able to look after your home? No problem  Self-care  Do you have problems keeping clean and tidy? No problem  Daytime activities  How do you spend your day? No problem  Physical health  How well do you feel physically? No problem  PsychoticSymptoms  Do you ever hear voices or have problems with your thoughts? Unmet need trouble with thoughts  Information on condition and treatment  Have you been given clear information about your medication? No problem  Psychological distress  Have you recently felt very sad or low? Unmet need   Safety to self  Do you ever have thoughts of harming yourself? No problem  Safety to others  Do you think you could be a danger to other people's safety? No problem  Alcohol  Does drinking cause you any problems? No problem  Drugs  Do you take any drugs that aren't prescribed? Met need currently at [facility]  Are you happy with your social life? Unmet need would like to have more social interaction in the future  Intimate relationships  Do you have a partner? No problem  Sexual expression  How is your sex life? No problem not asked    Do you have any children under 18? No problem  Basic education  Any difficulty in reading, writing, or understanding English? No problem  Telephone  Do you know how to use a telephone? No problem  Money  How do you find using the bus, tram or train? No problem  Money  How do you find budgeting your money? Met need her mom helps her to budget  Benefits  Are you getting all the money you are entitled to? No problem    EPI-NET Diagnosis Intake:  1. 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 Was a structured, standardized tool (e.g., the MINI, SCID) used to make this diagnosis? Yes  3. Does the client meet criteria for Clinical High Risk? No     DUP Intake:  1. Provide a best estimate of when tommy (not prodromal) psychotic symptoms (e.g., delusions, hallucinations, or  disorganized speech/behavior) began: 12/01/2021  2. How was this information obtained?    Administrative record  4. Between onset of psychotic symptoms and entry into this program, did the client receive any mental health treatment? Yes  6. Between onset of psychotic symptoms and entry into this program, was the client hospitalized at all for a psychiatric issue? Yes  7. If yes: How many times? 2    Medication and Medication Side Effects Intake:  1. Is the client currently prescribed an oral antipsychotic medication? No  4. Is the client currently prescribed a Long-Acting Injectable (JAY)? No   7. Is the client currently prescribed any other psychotropic medications? Yes   Escitalopram Oxalate (Lexapro)  Medication Side Effects?None    Brief Adherence Rating Scale (BARS) - Clinician Intake:  1. How many pills of Lexapro did the doctor tell you to take each day? 1  2. Since your last visit with me, on how many days did you NOT TAKE your Lexapro? Few, if any (< 7)  3. Since your last visit with me, how many days did you TAKE LESS THAN the prescribed number of pills of your Lexapro? Never/almost never (0%-25% of the time)  4. Please enter the number that you believe best describes, out of the prescribed antipsychotic medication doses, the proportion of doses taken by the patient in the past month (0-100%). 100%    Demographics and Background Intake:  1. What is your date of birth? 2000  2. What was your biological sex assigned at birth? Female  3. How do you identify your gender? Female  4. What is your sexual orientation? Jimenez or lesbian  5. City of Residency? [place]  6. [facility] of Residency? [place]  7. What is your race? Prefer not to say  8. What is your ethnicity?   9. What is your  origin? Other Specific  [place]  10. What is your [place] enrollment? Not Enrolled  12. What is your preferred language? English  13. What is your current martial status? Never   14. Are you  currently pregnant? No  15. Do you have any children? No  16. What is the highest education level you completed? Grade 12/GED  17. What is the highest education level completed by your mother? Graduated 4-year college  18. What type of work does your mother currently do or did she do most recently? Construction/Mechanical// Maintenance silversmith,     19. What is the highest education level completed by your father? Graduated 4-year college  20. What type of work does your father currently do or did he do most recently?  Professional/Technical/Managerial (e.g. doctor, , , teacher,)  21. What is your current housing situation? Group home or residential care with other individuals permanent housing in [place] with mom, currently living at [facility]  22. Are you a ? No  25. Were you ever in the foster care system? Yes  26. What type of health insurance do you currently have? Medicaid  27. What is your benefits payment source? [facility] (and rick funding)  28. Has insurance coverage impacted medication prescription and use? If so in what way? No Impact  29. Do you receive financial support from any of the following people? Mother  30. Do you currently receive any of the following monetary supports? None  31. Do you currently receive Supplemental Security Income (SSI)/Social Security Disability Insurance (SSDI)?   No, I never received SSI/SSDI   33. Have you applied for SSI/SSDI in the past six months? No  34. Who referred you to this program? Unknown   35. What other services have you used in the last month?   Outpatient Psychotherapy  Medication Management  36. Since March 2020, have you had COVID-19 related symptoms like a cough, fever, shortness of breath or difficulty breathing? Yes  37. Have you been tested for the coronavirus? Yes  38. What was the result? I have been tested and I tested negative (I did not have  coronavirus)    Education Intake:  1. What is the highest grade you have completed? High school diploma or GED  2. Are you currently attending school? Not attending   4. How many school days were you enrolled in in the past 30 days? 0  5. How many school days did you attend in the past 30 days? 0    Employment Intake:  1. What is you source of income currently? Employment, Family Support  2. Are you currently working toward a goal related to employment at this time, for example, to get a job or find a new job? No  3. What is your current employment/labor force status? Employed  4. Have you had an internship, apprenticeship, or done volunteer work any time in the past six months? No   5. What is the average number of hours per week you spend doing volunteer work in the past 30 days? None  6. Have you had a paid job any time in the past six months? Yes  7. 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 Is/was this a full-time position (more than 30 hours/week) or a part-time position (less than 30 hours per week)? Full-time  11. Have you had any other job during the past six months? No    EPI-NET Hospitalization Intake:  1. During the past six months, did you spend the night in a hospital for a mental health reason? Yes  2. During the past six months, how many times did you spend the night in a hospital for a mental health reason? 2  3. During the past six months, what was the total number of nights you spent in any hospital? 10 (5 nights each stay)  4. During the past six months, did you go to the emergency room for a mental health or substance use reason but did not stay overnight at the hospital? No   6. During the past six months, did you spend the night in a hospital, detox facility or a residential treatment facility for substance use? Yes  7. How many times were you admitted to a  hospital, detox facility or a residential treatment facility for substance use during the past six months? 1 [place]  8. During the past 6 months, what was the total number of nights you spent in that setting? 8   9. During the past six months, apart from mental health or substance use treatment, did you spend the night in a hospital for a medical condition? No   12. During the past six months, did you go to the emergency room for a medical reason? No  14. During the past six months did you spend the night in a crisis stabilization unit for a mental health or substance use reason? No   17. Number of lifetime psychiatric hospitalizations? 2  18. Number of emergency room visit in the last 30 days? 0  19. Number of suicide attempts in the last 30 days? 0  20. Number of non-suicidal self-injury in the last 30 days? 0    EPI-NET Legal Involvement and Related Intake:  1. What is your legal status? Voluntary- Self  2. In the preceding 6 months, how many contacts have you had with the criminal justice system? 1  3. In the past six months, have you had legal issues, probation, or parole? Yes someone has a restraining order against her  4. In the past six months, have you spent any nights in detention/long-term? No   6. In the past six months, have you had court-ordered treatment? No  7. Are you damon/NTP? No  8. In the past six months, have you had violent or aggressive thoughts? Yes  9. In the past six months, have you had violent or aggressive behavior? Yes    Family Involvement Intake:  1. During the past six months, how frequently was the client in contact with family? About daily  2. What is the client's preference for family involvement? Prefers family involvement with no restrictions  4. Does the family refuse to participate in treatment? No  5. Natural Supports for the client? Family/Guardian    Suicidality Intake:  1. In the past six months, has the client had suicidal ideation? No  2. In the past six months, has the  client had any suicide attempts? No  4. In the past six months, has the client had non-suicidal self-injurious behavior? No    Additional Behavioral Observations  Irene was open to all questions posed by the psychometrist. She maintained a flat affect and appeared to have questionable insight into her symptoms. Psychosis symptoms were reported as follows:    Unusual Thought Content: paranoia (towards her moms boyfriend), thoughts that aren't her own (described being surrounded by a bad friend group, peer pressure potentially rather than thought insertion)  AH: none   VH: none     Previous dx: ADHD, MDD, Psychosis NOS     Plan  Amie will meet with Gabby Quarles from the [facility] clinic for a full diagnostic assessment on 12/28/2022 @ 2:30pm.     Coordinate with other medical providers as needed.    Lara Garvin  Psychometrist

## 2022-12-28 ENCOUNTER — VIRTUAL VISIT (OUTPATIENT)
Dept: PSYCHIATRY | Facility: CLINIC | Age: 22
End: 2022-12-28
Payer: COMMERCIAL

## 2022-12-28 DIAGNOSIS — F90.9 ADHD (ATTENTION DEFICIT HYPERACTIVITY DISORDER): ICD-10-CM

## 2022-12-28 DIAGNOSIS — F12.90 CANNABIS USE DISORDER: ICD-10-CM

## 2022-12-28 DIAGNOSIS — F19.959 SUBSTANCE-INDUCED PSYCHOTIC DISORDER (H): Primary | ICD-10-CM

## 2022-12-28 DIAGNOSIS — F33.41 MAJOR DEPRESSIVE DISORDER, RECURRENT EPISODE, IN PARTIAL REMISSION (H): ICD-10-CM

## 2022-12-28 NOTE — PROGRESS NOTES
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 life threatening or urgent symptoms present. Please note, writer did receive all pertinent medical records as of the time of this assessment. Amie did not sign MARCEL's for additional records.    Billing for \"Interactive Complexity\"?    No    Plan   Next steps include intention of completing a continued multi-disciplinary assessment utilizing today's evaluation. The expertise of a PharmD, Psychologist, and Psychiatric consultation may be next steps. Informed Amie that if deemed appropriate for the First Episode of Psychosis services, care will be provided with goal of reducing distressing symptoms and improving functional recovery.    Medication Management: Irene is in need of Medication Management.  Medications will be addressed further during an MTM visit and new patient medication evaluation.  Continue to follow recommendations of current outpatient prescriber until recommendations are provided.     Therapy: Irene was interested in meeting with a therapist. Irene would benefit from therapy.   Irene was and Family was interested in participating in the Family PsychoEducation Program.     Supported Employment & Education: Irene is in need of employment and education support.     Case Management: Irene is not followed by a .  Case " Management is not an identified need at this time. This writer will assist in short-term case management support as needed until care is established with ongoing providers.     Other Psychosocial Supports: Irene is interested in the  Young Adult Group.  Family would benefit from  Family Psychoeducation & Support Group (Family email address [email or web address])    Medical Referrals: None     Referral information for the above mentioned supports will be discussed further at our feedback visit.   Without the recommended intervention, Amie is likely to experience possible increase in psychotic symptoms requiring hospitalization.     TREATMENT RISK STATEMENT:  The risks, benefits, alternatives and potential adverse effects have been discussed and are understood by the pt. The pt understands the risks of using street drugs or alcohol. There are no medical contraindications, the pt agrees to treatment with the ability to do so. The pt knows to call the clinic for any problems or to access emergency care if needed.  Medical and substance use concerns are documented above.     PROVIDER: Gabby Quarles [facility]  SUPERVISOR: Christelle BAILEY

## 2023-01-02 ENCOUNTER — VIRTUAL VISIT (OUTPATIENT)
Dept: PSYCHIATRY | Facility: CLINIC | Age: 23
End: 2023-01-02
Payer: COMMERCIAL

## 2023-01-02 DIAGNOSIS — F12.90 CANNABIS USE DISORDER: ICD-10-CM

## 2023-01-02 DIAGNOSIS — F90.9 ADHD (ATTENTION DEFICIT HYPERACTIVITY DISORDER): ICD-10-CM

## 2023-01-02 DIAGNOSIS — F33.42 MAJOR DEPRESSIVE DISORDER, RECURRENT EPISODE, IN FULL REMISSION (H): ICD-10-CM

## 2023-01-02 DIAGNOSIS — F19.959 SUBSTANCE-INDUCED PSYCHOTIC DISORDER (H): Primary | ICD-10-CM

## 2023-01-03 ENCOUNTER — TRANSFERRED RECORDS (OUTPATIENT)
Dept: HEALTH INFORMATION MANAGEMENT | Facility: CLINIC | Age: 23
End: 2023-01-03

## 2023-01-03 NOTE — PROGRESS NOTES
[facility] Clinician Feedback Session  A Part of the [facility] First Episode of Psychosis Program    Patient: Amie Santiago (2000, 22 year old)     MRN: [id number]  Date:  1/02/23  Clinician: Gabby Quarles      People present:   Writer  Client: Rebecca - Irene  Significant Other/Family/Friend:  Mother    Length of Actual Contact: Start Time: 1300; End Time: 1330     Location of contact:  Originating Location (patient location): [facility], located in [place], [place]  Distant Location (provider location): Home office, located in [place], [place], using appropriate privacy considerations and procedures  Mode of communication: Zoom  Primary diagnosis:   Substance induced psychosis   Rule out Schizophreniform   Cannabis use disorder   ADHD by history   Depression by history   Rule out PTSD       Amie Santiago is currently participating in medication management and chemical dependency treatment services with [facility]. They do seem appropriate for [facility] FEP services. This writer has provided clinical impressions, verbal and/or written information about [facility] First Episode Pscyhosis programs in the context of treating schizophrenia spectrum and other disorders associated with psychosis. This writer discussed Pt's ability to start Adult Strengths with later transfer of care if diagnostic clarity reveals a diagnosis other than a schizophrenia spectrum illness.    Gabby Quarles offered additional resources, including [facility]. Gabby will refer Irene to [facility].    Is this patient agreeable to start services with First Episode of Psychosis Services? Yes   If Yes; which program? Adult Strengths        What services is the Pt interested in receiving in our clinics?   Medication management: Yes   Individual therapy: Yes   Family therapy: Yes   Group therapy: Yes   Work/school support: Yes     Place Pt on waitlist(s)? Yes    With whom can someone  follow up for scheduling, etc.? Irene's mother, Criss    Method of follow up communication preferred? Phone or email    Gabby Quarles LGSW

## 2023-01-04 ENCOUNTER — TELEPHONE (OUTPATIENT)
Dept: PSYCHIATRY | Facility: CLINIC | Age: 23
End: 2023-01-04

## 2023-01-04 NOTE — TELEPHONE ENCOUNTER
Strengths Family Peer Support  A Part of the [facility] First Episode of Psychosis Program     Patient Name: Amie Santiago  /Age:  2000 (22 year old)  Date of Encounter: 23  Length of Contact: 1 minute    This writer attempted to reach out to offer family peer resources and support to patient's mother, Rufina.     This writer was unable to leave a voicemail message due to cell phone being out of order.    Felicia Chin CFPS  Strengths Family Peer    [Billing Code 35E7809 for Nonbillable Service as family peer support is a nonbillable service]

## 2023-01-26 DIAGNOSIS — F32.5 MAJOR DEPRESSIVE DISORDER WITH SINGLE EPISODE, IN FULL REMISSION (H): ICD-10-CM

## 2023-01-27 NOTE — TELEPHONE ENCOUNTER
Last visit with Dr. Echevarria 5/25/22:    The lexapro seems to be really helping--mood is much improved, more excited about summer job and connecting with friends.  Plan to continue Lexapro at current dose, and follow up in about 3 months.               Relevant Medications     escitalopram (LEXAPRO) 5 MG tablet            Return in about 3 months (around 8/25/2022) for Medication Follow up.     Letty Echevarria MD  [facility]     Patient due for med check. Looks like she is transitioning to adult care. Attempted to call, unable to reach patient or leave a VM. Will have to try again later.    Jyotsna Justice, RN

## 2023-01-30 RX ORDER — ESCITALOPRAM OXALATE 5 MG/1
TABLET ORAL
Qty: 90 TABLET | Refills: 0 | OUTPATIENT
Start: 2023-01-30

## 2023-02-21 ENCOUNTER — VIRTUAL VISIT (OUTPATIENT)
Dept: PSYCHIATRY | Facility: CLINIC | Age: 23
End: 2023-02-21
Attending: PSYCHOLOGIST
Payer: COMMERCIAL

## 2023-02-21 DIAGNOSIS — F12.90 CANNABIS USE DISORDER: Primary | ICD-10-CM

## 2023-02-21 PROCEDURE — 99207 PR NON-BILLABLE SERV PER CHARTING: CPT | Mod: VID | Performed by: PSYCHOLOGIST

## 2023-03-03 ASSESSMENT — PATIENT HEALTH QUESTIONNAIRE - PHQ9: SUM OF ALL RESPONSES TO PHQ QUESTIONS 1-9: 0
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
## 2023-03-06 ASSESSMENT — PATIENT HEALTH QUESTIONNAIRE - PHQ9
10. IF YOU CHECKED OFF ANY PROBLEMS, HOW DIFFICULT HAVE THESE PROBLEMS MADE IT FOR YOU TO DO YOUR WORK, TAKE CARE OF THINGS AT HOME, OR GET ALONG WITH OTHER PEOPLE: NOT DIFFICULT AT ALL
SUM OF ALL RESPONSES TO PHQ QUESTIONS 1-9: 0

## 2023-03-08 ENCOUNTER — VIRTUAL VISIT (OUTPATIENT)
Dept: PSYCHIATRY | Facility: CLINIC | Age: 23
End: 2023-03-08
Attending: SOCIAL WORKER
Payer: MEDICAID

## 2023-03-08 DIAGNOSIS — F29 PSYCHOSIS, UNSPECIFIED PSYCHOSIS TYPE (H): Primary | ICD-10-CM

## 2023-03-08 DIAGNOSIS — Z71.89 COUNSELING AND COORDINATION OF CARE: ICD-10-CM

## 2023-03-08 PROCEDURE — 99207 PR NO BILLABLE SERVICE THIS VISIT: CPT | Mod: VID | Performed by: SOCIAL WORKER
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
## 2023-03-14 ENCOUNTER — VIRTUAL VISIT (OUTPATIENT)
Dept: PSYCHIATRY | Facility: CLINIC | Age: 23
End: 2023-03-14
Attending: SOCIAL WORKER
Payer: MEDICAID

## 2023-03-14 DIAGNOSIS — F29 PSYCHOSIS, UNSPECIFIED PSYCHOSIS TYPE (H): Primary | ICD-10-CM

## 2023-03-14 DIAGNOSIS — F33.41 MAJOR DEPRESSIVE DISORDER, RECURRENT EPISODE, IN PARTIAL REMISSION (H): ICD-10-CM

## 2023-03-14 PROCEDURE — 99207 PR SERVICE NOT STAFFED W/SUPERV PROV: CPT | Mod: VID

## 2023-03-15 ENCOUNTER — MYC REFILL (OUTPATIENT)
Dept: PEDIATRICS | Facility: CLINIC | Age: 23
End: 2023-03-15
Payer: MEDICAID

## 2023-03-15 ENCOUNTER — TELEPHONE (OUTPATIENT)
Dept: PSYCHIATRY | Facility: CLINIC | Age: 23
End: 2023-03-15

## 2023-03-15 DIAGNOSIS — F32.5 MAJOR DEPRESSIVE DISORDER WITH SINGLE EPISODE, IN FULL REMISSION (H): ICD-10-CM

## 2023-03-15 NOTE — TELEPHONE ENCOUNTER
Strengths Family Peer Support  A Part of the [facility] First Episode of Psychosis Program     Patient Name: Amie Santiago  /Age:  2000 (22 year old)  Date of Encounter: 3/15/23  Length of Contact: 1 minute    This writer reached out to offer resources and support to patient's mother, Lizy.     An introductory voicemail message was left inviting a return call.    Felicia Chin CFPS  Strengths Family Peer    [Billing Code 03R8430 for Nonbillable Service as family peer support is a nonbillable service]

## 2023-03-15 NOTE — TELEPHONE ENCOUNTER
"Strengths Family Peer Support  A Part of the [facility] First Episode of Psychosis Program     Patient Name: Amie Santiago  /Age:  2000 (22 year old)  Date of Encounter: 3/15/23  Length of Contact: 24 minutes    This writer received a return call from patient's mother, Lizy.    Family peer support services were described and offered.  Lizy had some questions about the Strengths program and family education.    This writer provided [facility] resources includng \"Understanding Psychosis\" booklet and family support groups.    This writer will follow-up with Lizy in a few weeks.    Felicia Chin CFPS  Strengths Family Peer    [Billing Code 13Q8070 for Nonbillable Service as family peer support is a nonbillable service]    "

## 2023-03-16 ENCOUNTER — TELEPHONE (OUTPATIENT)
Dept: PSYCHIATRY | Facility: CLINIC | Age: 23
End: 2023-03-16
Payer: MEDICAID

## 2023-03-16 ENCOUNTER — TELEPHONE (OUTPATIENT)
Dept: PEDIATRICS | Facility: CLINIC | Age: 23
End: 2023-03-16
Payer: MEDICAID

## 2023-03-16 DIAGNOSIS — F32.5 MAJOR DEPRESSIVE DISORDER WITH SINGLE EPISODE, IN FULL REMISSION (H): Primary | ICD-10-CM

## 2023-03-16 DIAGNOSIS — F32.5 MAJOR DEPRESSIVE DISORDER WITH SINGLE EPISODE, IN FULL REMISSION (H): ICD-10-CM

## 2023-03-16 RX ORDER — ESCITALOPRAM OXALATE 10 MG/1
10 TABLET ORAL DAILY
Qty: 7 TABLET | Refills: 0 | Status: SHIPPED | OUTPATIENT
Start: 2023-03-16 | End: 2023-03-22

## 2023-03-16 RX ORDER — OLANZAPINE 5 MG/1
5 TABLET ORAL AT BEDTIME
Qty: 7 TABLET | Refills: 0 | Status: SHIPPED | OUTPATIENT
Start: 2023-03-16 | End: 2023-03-17

## 2023-03-16 NOTE — TELEPHONE ENCOUNTER
Patient called back. Reinforced the importance of continuency of primary care and need to establish with adult provider. Scheduled med check visit with Dr. Hayward (Dr. Echevarria is attending) next Wednesday, 3/22/23 per Dr. Huertas's recommendation.    Okay to send x1 refill of 10mg olanzepine and 10 mg escitalopram as prescribed through outside rx to get to appt next week? Verified with patient that she is taking 10 mg for each medication.     Preferred pharmacy is [facility], pended.     Jyotsna Justice RN

## 2023-03-16 NOTE — TELEPHONE ENCOUNTER
[facility] Psychiatry Clinic  First Episode of Psychosis - Strengths Program  Follow-up Contact    Patient Name: Amie Santiago  /Age:  2000 (22 year old)  Clinician: LEO Martinez     Correspondence with: Mother  Phone call (patient / identified key support person reached)    Reason for Follow-Up:   Mom Barbra) called with two primary concerns and would like a call back ASAP     1. She has some parental questions about the strengths program she would like to discuss     2. Irene will run out of anti-psychotic meds this weekend and has no way to get a new prescription.  Patient is in a sober house right now, does not have a PCP, and there is not a medical doctor on site.  Original prescription was prescribed by an inpatient provider (not through [facility]).  Would it be possible to get a bridge to her  appt with Dr. Murphy?     Lizy's best call back number is [phone]     Assessment/Progress note:  Provided case management and care coordination for Irene with regard to medication refill of antipsychotic.      Writer shared the available options to mom for Irene to get this medication urgently refilled before the weekend.   - Continue conversation with PCP office and request an urgent visit to obtain a refill.   - Contact [facility] inpatient to see if they can send a week supply, which would allow them to be seen by their PCP.   - Writer will contact assigned psychiatrist (Dr. Murphy) to request a 5 day supply to hold over till seeing PCP.    -Finally, [facility] (Acute Psychiatry Services).     Plan/Referrals:    Talked with mom.  Shared the above information.  Noted that Dr. Murphy will complete a chart review by tomorrow morning and see if they're comfortable giving a 5 day supply to support them until seeing their PCP next week.     If they want to have this done sooner, they can access [facility] or call [facility] inpatient prescriber.     Mom shared that the PCP agreed to refill until their  appointment with PCP resident on Wednesday 3/22/23.     Provided with writer's contact information and availability.   Will route to patient's current psychiatric provider(s) as an FYI.     Please call or EPIC message with any questions or concerns.

## 2023-03-16 NOTE — TELEPHONE ENCOUNTER
Mom is calling on behalf of adult patient. Told her to tell patient to call RN line back.     Mom is requesting urgent refill of antipsychotic medication (looks like olanzepine in chart). Mom states patient has hx of psychosis with x2 hospitalizations and was put on this med in treatment program. She will run out of the medication this weekend.     Patient is not scheduled with psychiatrist until May. Mom was told to reach out to PCP office for refill to get to appt.     Discussed with Dr. Huertas in clinic who recommended setting up soonest available appt with Dr. Echevarria or one of the resident physicians working with her. Then she agreed to send one week refill to get to appt. Held 1:40 spot with Dr. Haywadr next week on 3/22/23.     Called patient and left message to call back RN line for confirmation of above and to get pharmacy info for short-term refill.     Jyotsna Justice, RN

## 2023-03-16 NOTE — TELEPHONE ENCOUNTER
"Requested Prescriptions   Pending Prescriptions Disp Refills     escitalopram (LEXAPRO) 5 MG tablet 90 tablet 0     Sig: Take 1 tablet (5 mg) by mouth daily       SSRIs Protocol Failed - 3/15/2023  2:31 PM        Failed - Recent (6 mo) or future (30 days) visit within the authorizing provider's specialty     Patient had office visit in the last 6 months or has a visit in the next 30 days with authorizing provider or within the authorizing provider's specialty.  See \"Patient Info\" tab in inbasket, or \"Choose Columns\" in Meds & Orders section of the refill encounter.            Passed - PHQ-9 score less than 5 in past 6 months     Please review last PHQ-9 score.           Passed - Medication is active on med list        Passed - Patient is age 18 or older        Passed - No active pregnancy on record        Passed - No positive pregnancy test in last 12 months           Last visit on 5/25/22 with Dr. Echevarria:    \"  Major depressive disorder with single episode, in full remission (H)        The lexapro seems to be really helping--mood is much improved, more excited about summer job and connecting with friends.  Plan to continue Lexapro at current dose, and follow up in about 3 months.              \"    Due for med check in. [employer] message sent.     Magaly Goldman RN    "

## 2023-03-17 RX ORDER — OLANZAPINE 10 MG/1
10 TABLET ORAL AT BEDTIME
Qty: 7 TABLET | Refills: 0 | Status: SHIPPED | OUTPATIENT
Start: 2023-03-17 | End: 2023-03-22

## 2023-03-17 RX ORDER — OLANZAPINE 5 MG/1
TABLET ORAL
Qty: 7 TABLET | Refills: 0 | OUTPATIENT
Start: 2023-03-17

## 2023-03-17 NOTE — TELEPHONE ENCOUNTER
Patient/family was instructed to return call to [facility] RN directly on the RN Call Back Line at [phone].    Magaly Goldman RN

## 2023-03-17 NOTE — TELEPHONE ENCOUNTER
Patient called back yesterday and left message asking when olanzepine will be sent. Looks like 5 mg was signed instead of 10mg yesterday. Routing to provider for review, then team can call back to patient with update.    Jyotsna Justice RN

## 2023-03-21 ENCOUNTER — VIRTUAL VISIT (OUTPATIENT)
Dept: PSYCHIATRY | Facility: CLINIC | Age: 23
End: 2023-03-21
Attending: SOCIAL WORKER
Payer: MEDICAID

## 2023-03-21 DIAGNOSIS — F29 PSYCHOSIS, UNSPECIFIED PSYCHOSIS TYPE (H): Primary | ICD-10-CM

## 2023-03-21 DIAGNOSIS — F33.41 MAJOR DEPRESSIVE DISORDER, RECURRENT EPISODE, IN PARTIAL REMISSION (H): ICD-10-CM

## 2023-03-21 PROCEDURE — 90834 PSYTX W PT 45 MINUTES: CPT | Mod: VID

## 2023-03-21 NOTE — PROGRESS NOTES
[facility]  Psychiatry Clinic  First Episode of Psychosis - Strengths Program  Clinician Contact & Progress Note   For Individual Resiliency Training (IRT) & Psychotherapy     Patient: Amie Santiago (2000)     MRN: [id number]  Diagnosis(es): (F29) Psychosis, unspecified psychosis type (H)  (primary encounter diagnosis)  (F33.41) Major depressive disorder, recurrent episode, in partial remission (H)  Clinician: Reed Holley  Service Type: Psychotherapy 16-37 minutes    Date:  3/14/23    Video- Visit Details   Type of service:  video visit  Video start time: 3:07pm  Video end time: 3:37 ppm  Originating location (patient location):  [facility]   Location- Home  Distant Site (provider location): HIPAA compliant location Off-site  Platform used for video visit:  Secure real time interactive audio and visual telecommunication system via [employer]    People present:   Patient   Reed Holley     Intervention:  Motivational Interviewing   Promote hope and positive expectations    Educational Teaching Strategies   Review of written material/education  Relate information to client's experience  Ask questions to check comprehension  Break down information into small chunks    CBT    Relaxation training (model relaxation technique, practice technique and plan home practice.    IRT Module(s) or topics addressed:  Module 1 - Orientation    Did the client complete the home practice option(s) from the previous session:   Completed    Techniques utilized:   Raymond announced at beginning of session  Present new material  Summarize progress made in current session  Identified urgent concerns  Assessed caregiver/family burden  Review of strengths, barriers, objectives, and interventions  Illicit client/family feedback    Mental Status Exam:  Alertness: alert   Appearance: well groomed  Behavior/Demeanor: cooperative and pleasant, with good eye contact   Speech: normal  Language: no  problems  Psychomotor: normal or unremarkable  Mood: description consistent with euthymia  Thought Process/Associations:  logical unremarkable  Thought Content:  no evidence of suicidal ideation or homicidal ideation, no evidence of psychotic thought, no auditory hallucinations present and no visual hallucinations present  Perception:  Reports none;  Denies none  Insight: good  Judgment: good  Cognition: does  appear grossly intact; formal cognitive testing was not done;       Assessment/Progress Note:   I met with Amie for an IRT session.  The focus of today's session was orientation and promoting return to functioning in emotional regulation, thought.  .  This writer utilized therapeutic techniques of Supportive, Motivational Interviewing and Insight-oriented. This was Amie s first official IRT session and she reported minimal symptoms presence, denying any current symptoms of psychosis.  Discussed the structure of the Navigate program, IRT, and Amie's experience prior to her psychosis. Amie was open to trying relaxed breathing technique. She did identify urgent concerns needing to be addressed in today.      Patient did not report any changes to medications     Module 1: Introduction to Orientation including orientation to NAVIGATE Program as a whole, as well as orientation to Individual Resiliency Training (IRT) more specifically.       Overall Amie was cooperative throughout the session.  This writer observed Amie had good insight regarding her stressors.  Helpful clinical techniques utilized during today's appointment appeared to be active listening, empathic, asking interest and checking for understanding.  As of today's appt insight to their mental illness appears good.  Symptom assessment, safety assessment, discussion and identification of coping strategies, and exploration of material in IRT modules will assist to create treatment plan in next session.  Progress toward goal completion seems  good.    With regards to safety, Amie reported the following:    Suicidal ideation: denies. Plan:denies. Urges:denies. Intent: denies.     Self-harm: Thoughts -denies.. Urges -denies. Intent -denies.    Homicidal or violent ideation: denies. Specific individual(s): denies. Plan: denies. Urges: denies. Intent: denies.    [place] Protocol Risk Identification:  1) Have you wished you were dead or wished you could go to sleep and not wake up? No  2) Have you actually had any thoughts about killing yourself? No  If YES to 2, answer questions 3, 4, 5, 6  If NO to 2, go directly to question 6  3) Have you thought about how you might do this? N/A  4) Have you had any intension of acting on these thoughts of killing yourself, as opposed to you have the thoughts but you definitely would not act on them? N/A  5) Have you started to work out or worked out the details of how to kill yourself? Do you intend to carry out this plan? N/A  Always Ask Question 6  6) Have you done anything, started to do anything, or prepared to do anything to end your life? No  Examples: collected pills, obtained a gun, gave away valuables, wrote a will or suicide note, held a gun but changed your mind, cut yourself, tried to hang yourself, etc.    Discussed overall safety plan should symptoms feel unmanageable or safety concerns become imminent to include: call the clinic, call crisis or call a family member/support system. Amie was able to contract for safety.   Crisis Numbers:   Provided routinely in AVS     After hours:  [phone]    Plan/Referrals:   Home practice was identified as practicing relaxed breathing, exercise to gain comfort while using it. Also thinking of some goals for treatment plan.     Irene is participating in coordinated speciality care via the Strengths Program within our clinic.  Will meet with Amie weekly  for Individual Resiliency training, therapy, and support aimed at maximizing Amie's opportunity for recovery  from psychosis.    Next session: 3/21/23    Treatment plan last completed on: NA  Next treatment plan update due by: 3/21/23  Treatment plan was not initiated and completed at this visit.This was first session with client.   Acknowledged consent of current treatment plan was not signed.    Please EPIC message with any questions or concerns.  PROVIDER: Reed Holley    Patient reviewed in supervision with Rene Miller who will sign the note.          Additional screening measures (Complete every 90 days)   DAMIÁN-7 and PHQ-9:  Last PHQ-9 3/3/2023   1.  Little interest or pleasure in doing things 0   2.  Feeling down, depressed, or hopeless 0   3.  Trouble falling or staying asleep, or sleeping too much 0   4.  Feeling tired or having little energy 0   5.  Poor appetite or overeating 0   6.  Feeling bad about yourself 0   7.  Trouble concentrating 0   8.  Moving slowly or restless 0   Q9: Thoughts of better off dead/self-harm past 2 weeks 0   PHQ-9 Total Score 0   Difficulty at work, home, or with people -     DAMIÁN-7  4/28/2021   1. Feeling nervous, anxious, or on edge 0   2. Not being able to stop or control worrying 0   3. Worrying too much about different things 0   4. Trouble relaxing 0   5. Being so restless that it is hard to sit still 0   6. Becoming easily annoyed or irritable 0   7. Feeling afraid, as if something awful might happen 0   DAMIÁN-7 Total Score 0   If you checked any problems, how difficult have they made it for you to do your work, take care of things at home, or get along with other people? Not difficult at all       [facility] GAF:   Last completed on:NA  Next to be completed by: KARYN     Practitioner Outcome Survey: Illness Management and Recovery  Last survey completed on: 03/21/2023  Survey to be completed next: 6/19/23      Practitioner Outcome Survey: Illness Management and Recovery (Complete every 90 days)   1. Progress toward goals: In the past 3 months, the consumer has come up with       [x] No  personal goals     [] A personal goal, but has not done anything to achieve the goal     [] A personal goal and made it a little way toward achieving it     [] A personal goal and has gotten pretty far in achieving the goal     [] A personal goal and has achieved it    2. Knowledge: How much do you feel the consumer knows about symptoms, treatment, coping strategies (coping methods), and medication?     [] Not very much     [] A little     [x] Some     [] Quite a bit     [] A great deal    3. Involvement of family and friends in the consumer's mental health treatment: How much are family members, friends, boyfriends or girlfriends, and other people who are important to the consumer (outside the mental health agency) involved in his or her treatment?     [] Not at all     [] Only when there is a serious problem     [] Sometimes, such as when things are starting to go badly     [x] Much of the time     [] A lot of the time and they really help with the consumer's mental health    4. Contact with people outside of the family: In a normal week, how many times does the consumer talk to someone outside of his or her family (a friend, co-worker, classmate, roommate, etc.)?     [] 0 times a week     [x] 1 to 2 times a week     [x] 3 to 4 times a week     [] 5 to 7 times a week     [] 8 or more times a week    5. Time in structured roles: How much time does the consumer spend working, volunteering, being a student, being a parent, taking care of someone else or someone else's house or apartment? That is, how much time does the consumer spend doing activities that are expected of him or her for or with another person? (This would not include self-care or personal home maintenance.)     [] 2 hours or less a week     [x] 3 to 5 hours a week     [] 6 to 15 hours a week     [] 16 to 30 hours a week     [] More than 30 hours a week    6. Symptom distress: How much do symptoms bother the consumer?     [] Symptoms really bother the  consumer a lot     [] Symptoms bother the consumer quite a bit     [] Symptoms bother the consumer somewhat     [x] Symptoms bother the consumer very little     [] Symptoms don't bother the consumer at all    7. Impairment of functioning: How much do symptoms get in the way of the consumer's doing things that he or she would like to do or needs to do?     [] Symptoms really get in the consumer's way a lot     [] Symptoms get in the consumer's way quite a bit     [] Symptoms get in the consumer's way somewhat     [] Symptoms get in the consumer's way very little     [x] Symptoms don't get in the consumer's way at all    8. Relapse Prevention Planning: Which of the following would best describe what the consumer knows and has done in order to not have a relapse?     [] Doesn't know how to prevent relapses     [] Knows a little, but hasn't made a relapse prevention plan     [x] Knows one or two things to do, but doesn't have a written plan     [] Knows several things to do, but doesn't have a written plan     [] Has a written plan and has shared it with others    9. Relapse of symptoms: When is the last time the consumer had a relapse of symptoms (that is, when symptoms have gotten much worse)?     [] Within the last month     [] In the past 2 to 3 months     [x] In the past 4 to 6 months     [] In the past 7 to 12 months     [] Hasn't had a relapse in the past year    10. Psychiatric hospitalizations: When is the last time the consumer has been hospitalized for mental health or substance abuse reasons?     [] Within the last month     [] In the past 2 to 3 months     [x] In the past 4 to 6 months     [] In the past 7 to 12 months     []No hospitalization in the past year    11. Coping: How well do you feel that the consumer is coping with his or her mental or emotional illness from day to day?     [] Not well at all     [] Not very well     [x] All right     [] Well     [] Very well    12. Involvement with self-help  activities: How involved is he or she in consumer-run services, peer support groups, Alcoholics Anonymous, drop-in centers, WRAP (Wellness Recovery Action Plan), or other similar self-help programs?     [] Doesn't know about any self-help activities     [] Knows about some self-help activities, but isn't interested     [] Is interested in self-help activities, but hasn't participated in the past year     [x] Participates in self-help activities occasionally     [] Participates in self-help activities regularly    13. Using medication effectively: How often does the consumer take medication as prescribed?     [] Never     [] Occasionally     [] About half the time     [] Most of the time     [x] Every day     [] Check here if no psychiatric medications have been prescribed for the consumer.    14. Impairment of functioning through alcohol use: Drinking can interfere with functioning when it contributes to conflict in relationships; to financial, housing, and legal concerns; to difficulty attending appointments or focusing during them; or to increases of symptoms. Over the past 3 months, did alcohol use get in the way of the consumer's functioning?     [] Alcohol use really gets in the consumer's way a lot     [] Alcohol use gets in the consumer's way quite a bit     [] Alcohol use gets in the consumer's way somewhat     [] Alcohol use gets in the consumer's way very little     [x] Alcohol use is not a factor in the consumer's functioning    15. Impairment of functioning through drug use: Using street drugs and misusing prescription or over-the-counter medication can interfere with functioning when it contributes to conflict in relationships; to financial, housing, and legal concerns; to difficulty attending appointments or focusing during them; or to increases of symptoms. Over the past 3 months, did drug use get in the way of the consumer's functioning?     [] Drug use really gets in the consumer's way a lot     [x]  Drug use gets in the consumer's way quite a bit     [] Drug use gets in the consumer's way somewhat     [] Drug use gets in the consumer's way very little     [] Drug use is not a factor in the consumer's functioning
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
## 2023-03-22 ENCOUNTER — OFFICE VISIT (OUTPATIENT)
Dept: PEDIATRICS | Facility: CLINIC | Age: 23
End: 2023-03-22
Payer: MEDICAID

## 2023-03-22 VITALS
SYSTOLIC BLOOD PRESSURE: 99 MMHG | HEART RATE: 80 BPM | BODY MASS INDEX: 23.08 KG/M2 | WEIGHT: 125.4 LBS | HEIGHT: 62 IN | DIASTOLIC BLOOD PRESSURE: 57 MMHG | TEMPERATURE: 98 F

## 2023-03-22 DIAGNOSIS — F32.5 MAJOR DEPRESSIVE DISORDER WITH SINGLE EPISODE, IN FULL REMISSION (H): ICD-10-CM

## 2023-03-22 PROCEDURE — 91312 COVID-19 VACCINE BIVALENT BOOSTER 12+ (PFIZER): CPT | Performed by: STUDENT IN AN ORGANIZED HEALTH CARE EDUCATION/TRAINING PROGRAM

## 2023-03-22 PROCEDURE — 99213 OFFICE O/P EST LOW 20 MIN: CPT | Mod: GE | Performed by: STUDENT IN AN ORGANIZED HEALTH CARE EDUCATION/TRAINING PROGRAM

## 2023-03-22 PROCEDURE — 0124A COVID-19 VACCINE BIVALENT BOOSTER 12+ (PFIZER): CPT | Performed by: STUDENT IN AN ORGANIZED HEALTH CARE EDUCATION/TRAINING PROGRAM

## 2023-03-22 PROCEDURE — 90715 TDAP VACCINE 7 YRS/> IM: CPT | Performed by: STUDENT IN AN ORGANIZED HEALTH CARE EDUCATION/TRAINING PROGRAM

## 2023-03-22 PROCEDURE — 90471 IMMUNIZATION ADMIN: CPT | Performed by: STUDENT IN AN ORGANIZED HEALTH CARE EDUCATION/TRAINING PROGRAM

## 2023-03-22 RX ORDER — ESCITALOPRAM OXALATE 5 MG/1
5 TABLET ORAL DAILY
Qty: 90 TABLET | Refills: 0 | OUTPATIENT
Start: 2023-03-22

## 2023-03-22 RX ORDER — ESCITALOPRAM OXALATE 10 MG/1
15 TABLET ORAL DAILY
Qty: 75 TABLET | Refills: 0 | Status: SHIPPED | OUTPATIENT
Start: 2023-03-22 | End: 2023-05-09

## 2023-03-22 RX ORDER — OLANZAPINE 10 MG/1
10 TABLET ORAL AT BEDTIME
Qty: 7 TABLET | Refills: 0 | Status: CANCELLED | OUTPATIENT
Start: 2023-03-22

## 2023-03-22 RX ORDER — OLANZAPINE 10 MG/1
10 TABLET ORAL AT BEDTIME
Qty: 50 TABLET | Refills: 0 | Status: SHIPPED | OUTPATIENT
Start: 2023-03-22 | End: 2023-05-09

## 2023-03-22 RX ORDER — ESCITALOPRAM OXALATE 10 MG/1
10 TABLET ORAL DAILY
Qty: 7 TABLET | Refills: 0 | Status: CANCELLED | OUTPATIENT
Start: 2023-03-22

## 2023-03-22 NOTE — PATIENT INSTRUCTIONS
"Please call to establish with primary care ASAP (before the end of the week)    Here are two options, but if you are flexible you can say that on the phone and they will give you a first available \"establish care\" new patient appointment.    Dr. Robert Glass [facility],  [address], [place], [place], [id number] [phone]  [email or web address]     Dr. Kwon  [facility],   [address] [place] [address], [address], [place], [place], [address]  [phone]  [email or web address]  "

## 2023-03-22 NOTE — PROGRESS NOTES
Assessment & Plan     Major depressive disorder with single episode, in full remission (H)  Overall feels things are going well, happy with current medications without noted side effects. Requests small increase in Lexapro to 15, which is reasonable. Sees therapist weekly, good fit and will continue. Stable living, has two job interviews this afternoon! Has appointment with the [facility] on 5/11 so will bridge with 50 days of medication to get her there, though would like her to establish with new PCP prior to psych appointment. She can of course contact us with any questions or concerns in interim.  - escitalopram (LEXAPRO) 10 MG tablet  Dispense: 75 tablet; Refill: 0  - OLANZapine (ZYPREXA) 10 MG tablet  Dispense: 50 tablet; Refill: 0  - Transition to adult care provider recommended with [facility] or [facility] - names given (Dr. Wiliam Recinos had previously given recommendations via [facility] - added into AVS today) and as she is flexible about who to see, can likely get in ASAP. Asked her to please call by the end of the week to get an appointment on the books and schedule an establish care visit for within 1 month, which she was agreeable with.  - Of note, did have screening labs for olanzapine so not yet due, can do with adult PCP or [facility] (available in Care Everywhere)     Health Maintenance  - COVID bivalent booster  - Tdap       Betsy Hayward MD, PGY3    Irene was staffed with Dr. Barfield. Discussed meds with Dr. Springer, Child and Adolescent Psychiatry.      Subjective   Irene is a 22 year old, presenting for the following health issues:       Recheck Medication    Additional Questions 3/22/2023   Roomed by grecia head   Accompanied by herself     History of Present Illness       Reason for visit:  Medication    She eats 2-3 servings of fruits and vegetables daily.She consumes 0 sweetened beverage(s) daily.She exercises with enough effort to increase her heart rate 30  "to 60 minutes per day.  She exercises with enough effort to increase her heart rate 5 days per week.   She is taking medications regularly.       Overall- doing well. She has a few days left of medication but does not have a psychiatry appointment until 5/11. These meds were prescribed at her last inpatient admission (about 2 months ago) but she did not get enough. She has been taking her medication regularly as prescribed and feels it greatly helps. She has been stable on 10 of Lexapro but is wondering about a small increase, as her depressive symptoms (boredom, not wanting to do anything) have come back a bit and the lexapro helped most with that at the beginning. No thoughts of self harm, SI or HI. Mood has been stable and overall improved. In stable sober housing with 2 other people. Has two job interviews this afternoon. Has a ride waiting for her to take her there.     Eating, drinking, urinating and stooling well. No noted side effects from the meds. Sees therapist every Tuesday which is quite helpful, has a good relationship and will continue with this. No other concerns today. Amenable to vaccines if needed. Would like recommendations for adult provider.       Objective    BP 99/57   Pulse 80   Temp 98  F (36.7  C) (Oral)   Ht 5' 1.81\" (1.57 m)   Wt 125 lb 6.4 oz (56.9 kg)   BMI 23.08 kg/m    Body mass index is 23.08 kg/m .     Physical Exam   GENERAL: healthy, alert and no distress  NECK: no asymmetry, masses, or scars, normal ROM  RESP: lungs clear to auscultation - no rales, rhonchi or wheezes  CV: regular rate and rhythm, normal S1 S2, no S3 or S4, no murmur, click or rub, cap refill 2-3 seconds  MS: no gross musculoskeletal defects noted, no edema       ----- Service Performed and Documented by Resident or Fellow ------            "

## 2023-03-23 NOTE — PROGRESS NOTES
[facility]  Psychiatry Clinic  First Episode of Psychosis - Strengths Program  Clinician Contact & Progress Note   For Individual Resiliency Training (IRT) & Psychotherapy     Patient: Amie Santiago (2000)     MRN: [id number]  Diagnosis(es): (F29) Psychosis, unspecified psychosis type (H)  (primary encounter diagnosis)  (F33.41) Major depressive disorder, recurrent episode, in partial remission (H)  Clinician: Reed Holley  Service Type: Psychotherapy 38-52 minutes    Date:  3/21/23    Video- Visit Details   Type of service:  video visit  Video start time: 3.08pm  Video end time: 3:48 pm  Originating location (patient location):  [facility]   Location- [place]  Distant Site (provider location): HIPAA compliant location Off-site  Platform used for video visit:  Secure real time interactive audio and visual telecommunication system via [employer]    People present:   Patient   Reed Holley     Intervention:  Motivational Interviewing   Connect info and skills with personal goals  Promote hope and positive expectations    Educational Teaching Strategies   Review of written material/education  Relate information to client's experience  Ask questions to check comprehension    CBT   Reinforcement and shaping (positive feedback for steps towards goals, gains in knowledge & skills and follow-through on home assignments)  Reframe Cognitive Distortions (become aware of which distortions you are most vulnerable to, identifying and challenging our harmful automatic thoughts)  Pleasant Activity Scheduling (scheduling activities in the near future that you can look forward to, introduced more positivity and reduce negative thinking)  Recognizing the positive (Visualize, write, or discuss the best parts of the day to promote positive thinking patterns)      IRT Module(s) or topics addressed:  Module 2 - Assessment/Initial Goal Setting    Did the client complete the home practice option(s) from  the previous session:   Partially Completed    Techniques utilized:   [place] announced at beginning of session  Review of goal  Review of previous meeting  Present new material  Identified urgent concerns  Assessed caregiver/family burden  Review of strengths, barriers, objectives, and interventions  Illicit client/family feedback    Mental Status Exam:  Alertness: alert   Appearance: well groomed  Behavior/Demeanor: cooperative, with good eye contact   Speech: normal  Language: no problems and good  Psychomotor: sits forward or near front of chair  Mood: description consistent with euthymia  Thought Process/Associations:  logical unremarkable  Thought Content:  no evidence of suicidal ideation or homicidal ideation, no evidence of psychotic thought, no auditory hallucinations present and no visual hallucinations present  Perception:  Reports none;  Denies none  Insight: good  Judgment: good  Cognition: does  appear grossly intact; formal cognitive testing was not done;       Assessment/Progress Note:   I met with Amie for an IRT session to complete treatment plan.      Patient did not report any changes to medications     Module 2: Assessment and Goal Setting including discussing and defining recovery and resiliency, identifying and exploring Amie's strengths and areas of desired improvement, and developing goals and specific steps to achieving these goals for Amie.     Overall Amie was cooperative throughout the session.  This writer observed she was being very engaged and postive.  Helpful clinical techniques utilized during today's appointment appeared to be motivational interviewing.  As of today's appt insight to their mental illness appears good.  Symptom assessment, safety assessment, discussion and identification of coping strategies, and exploration of material in IRT modules was all in support of Amie's self-identified goal(s) of wanting to prevent relapse of symtopms, as identified in most  recent BEH Treatment Plan. Progress toward goal completion seems adequate.    With regards to safety, Amie reported the following:    Suicidal ideation: none. Plan: none. Urges: none. Intent: none.     Self-harm: Thoughts - none. Urges - none. Intent - none.    Homicidal or violent ideation: none. Specific individual(s): none. Plan: none. Urges: none. Intent: none.    [place] Protocol Risk Identification:  1) Have you wished you were dead or wished you could go to sleep and not wake up? No  2) Have you actually had any thoughts about killing yourself? No  If YES to 2, answer questions 3, 4, 5, 6  If NO to 2, go directly to question 6  3) Have you thought about how you might do this? N/A  4) Have you had any intension of acting on these thoughts of killing yourself, as opposed to you have the thoughts but you definitely would not act on them? N/A  5) Have you started to work out or worked out the details of how to kill yourself? Do you intend to carry out this plan? N/A  Always Ask Question 6  6) Have you done anything, started to do anything, or prepared to do anything to end your life? No  Examples: collected pills, obtained a gun, gave away valuables, wrote a will or suicide note, held a gun but changed your mind, cut yourself, tried to hang yourself, etc.    Discussed overall safety plan should symptoms feel unmanageable or safety concerns become imminent to include: call family, or call crisis line/emergency line. Amie was able to contract for safety.   Crisis Numbers:   Provided routinely in AVS     After hours:  [phone]    Plan/Referrals:   Home practice was identified as N/A    Irene is participating in coordinated speciality care via the Strengths Program within our clinic.  Will meet with Amie weekly  for Individual Resiliency training, therapy, and support aimed at maximizing Amie's opportunity for recovery from psychosis.    Next session: 3/28/23    Treatment plan last completed on:  3/21/23  Next treatment plan update due by: 6/21/23  Treatment plan was initiated and completed at this visit. (Detail why not if incomplete or not reviewed).   Acknowledged consent of current treatment plan was not signed (d/t patient not being present in this session. Will review and sign at the next session the patient is present with us).     Please EPIC message with any questions or concerns.  PROVIDER: Reed Holley    Patient reviewed in supervision with Rene Miller who will sign the note.         Additional screening measures (Complete every 90 days)   DAMIÁN-7 and PHQ-9:  Last PHQ-9 3/3/2023   1.  Little interest or pleasure in doing things 0   2.  Feeling down, depressed, or hopeless 0   3.  Trouble falling or staying asleep, or sleeping too much 0   4.  Feeling tired or having little energy 0   5.  Poor appetite or overeating 0   6.  Feeling bad about yourself 0   7.  Trouble concentrating 0   8.  Moving slowly or restless 0   Q9: Thoughts of better off dead/self-harm past 2 weeks 0   PHQ-9 Total Score 0   Difficulty at work, home, or with people -     DAMIÁN-7  4/28/2021   1. Feeling nervous, anxious, or on edge 0   2. Not being able to stop or control worrying 0   3. Worrying too much about different things 0   4. Trouble relaxing 0   5. Being so restless that it is hard to sit still 0   6. Becoming easily annoyed or irritable 0   7. Feeling afraid, as if something awful might happen 0   DAMIÁN-7 Total Score 0   If you checked any problems, how difficult have they made it for you to do your work, take care of things at home, or get along with other people? Not difficult at all       [facility] GAF:   Last completed on: NA  Next to be completed by: TBD    Practitioner Outcome Survey: Illness Management and Recovery  Last survey completed on: 3/14/23  Survey to be completed next: 6/14/23

## 2023-03-28 ENCOUNTER — VIRTUAL VISIT (OUTPATIENT)
Dept: PSYCHIATRY | Facility: CLINIC | Age: 23
End: 2023-03-28
Attending: SOCIAL WORKER
Payer: MEDICAID

## 2023-03-28 DIAGNOSIS — F33.41 MAJOR DEPRESSIVE DISORDER, RECURRENT EPISODE, IN PARTIAL REMISSION (H): ICD-10-CM

## 2023-03-28 DIAGNOSIS — F29 PSYCHOSIS, UNSPECIFIED PSYCHOSIS TYPE (H): Primary | ICD-10-CM

## 2023-03-28 PROCEDURE — 90834 PSYTX W PT 45 MINUTES: CPT | Mod: HN

## 2023-03-28 NOTE — PROGRESS NOTES
[facility]  Psychiatry Clinic  First Episode of Psychosis - Strengths Program  Clinician Contact & Progress Note   For Individual Resiliency Training (IRT) & Psychotherapy     Patient: Amie Santiago (2000)     MRN: [id number]  Diagnosis(es): (F29) Psychosis, unspecified psychosis type (H)  (primary encounter diagnosis)  (F33.41) Major depressive disorder, recurrent episode, in partial remission (H)  Clinician: Reed Holley  Service Type: Psychotherapy 38-52 minutes    Date:  3/28/23    Video- Visit Details   Type of service:  video visit  Video start time: 3:30 pm  Video end time: 4:10pm  Originating location (patient location):  [facility]   Location- [place]  Distant Site (provider location): HIPAA compliant location Off-site  Platform used for video visit:  Secure real time interactive audio and visual telecommunication system via [employer]    People present:   Patient   Reed Holley     Intervention:  Motivational Interviewing   Connect info and skills with personal goals  Promote hope and positive expectations    Educational Teaching Strategies   Review of written material/education  Relate information to client's experience  Ask questions to check comprehension  Break down information into small chunks    CBT   Reinforcement and shaping (positive feedback for steps towards goals)  Reframe Cognitive Distortions (become aware of which distortions you are most vulnerable to, identifying and challenging our harmful automatic thoughts)  Pleasant Activity Scheduling (scheduling activities in the near future that you can look forward to, introduced more positivity and reduce negative thinking)  Recognizing the positive (Visualize, write, or discuss the best parts of the day to promote positive thinking patterns)      IRT Module(s) or topics addressed:  Module 2 - Assessment/Initial Goal Setting    Did the client complete the home practice option(s) from the previous session:   Not  Applicable    Techniques utilized:   [place] announced at beginning of session  Present new material  Help client choose a home practice option  Identified urgent concerns  Assessed caregiver/family burden  Review of strengths, barriers, objectives, and interventions  Illicit client/family feedback    Mental Status Exam:  Alertness: alert   Appearance: well groomed  Behavior/Demeanor: cooperative, with good eye contact   Speech: normal  Language: no problems  Psychomotor: normal or unremarkable  Mood: description consistent with euthymia  Thought Process/Associations:  logical unremarkable  Thought Content:  no evidence of suicidal ideation or homicidal ideation, no evidence of psychotic thought, no auditory hallucinations present and no visual hallucinations present  Perception:  Reports none;  Denies none  Insight: good  Judgment: good  Cognition: does  appear grossly intact; formal cognitive testing was not done;       Assessment/Progress Note:   I met with Amie for an IRT session.  The focus of today's session was on identifying Irene's top five strengths. This writer utilized therapeutic techniques of Supportive and Motivational Interviewing. Assessed symptom presence and potential triggers for the patient. Discussed her current relapse prevention plan and stated that she does not have one in place. However, she expressed willingness to create one with this writer's help. Explored additional strategies Amie can try to effectively cope with stress. Amie was open to trying newly discussed coping strategies. She did not identify urgent concerns needing to be addressed in today.      Patient did not report any changes to medications     Module 2: Assessment and Goal Setting including discussing and defining recovery and resiliency, identifying and exploring Amie's strengths and areas of desired improvement, and developing goals and specific steps to achieving these goals for Amie.       Overall Amie was  cooperative throughout the session.  This writer observed was tired and she mentioned that she has been in meetings all day.  Helpful clinical techniques utilized during today's appointment appeared to be open-ended questions, reflective listening, and breaking down information.  As of today's appt insight to their mental illness appears good.  Symptom assessment, safety assessment, discussion and identification of coping strategies, and exploration of material in IRT modules was all in support of Amie's self-identified goal(s) of identifying top strengths, as identified in most recent BEH Treatment Plan. Progress toward goal completion seems good.    With regards to safety, Amie reported the following:    Suicidal ideation: none. Plan: none. Urges: none. Intent: none.     Self-harm: Thoughts - none. Urges - none. Intent - none.    Homicidal or violent ideation: none. Specific individual(s): none. Plan: none. Urges: none. Intent: none.    [place] Protocol Risk Identification:  1) Have you wished you were dead or wished you could go to sleep and not wake up? No  2) Have you actually had any thoughts about killing yourself? No  If YES to 2, answer questions 3, 4, 5, 6  If NO to 2, go directly to question 6  3) Have you thought about how you might do this? N/A  4) Have you had any intension of acting on these thoughts of killing yourself, as opposed to you have the thoughts but you definitely would not act on them? N/A  5) Have you started to work out or worked out the details of how to kill yourself? Do you intend to carry out this plan? N/A  Always Ask Question 6  6) Have you done anything, started to do anything, or prepared to do anything to end your life? No  Examples: collected pills, obtained a gun, gave away valuables, wrote a will or suicide note, held a gun but changed your mind, cut yourself, tried to hang yourself, etc.    Discussed overall safety plan should symptoms feel unmanageable or safety  concerns become imminent to include: call crisis line/emergency, or call family/support system. Amie was able to contract for safety.   Crisis Numbers:   Provided routinely in AVS     After hours:  [phone]    Plan/Referrals:   Home practice was identified as Utilize any of the  identified top 5 strengths in new ways you haven't before.     Irene is participating in coordinated speciality care via the Strengths Program within our clinic.  Will meet with Amie weekly  for Individual Resiliency training, therapy, and support aimed at maximizing Amie's opportunity for recovery from psychosis.    Next session: 4/4/23    Treatment plan last completed on: 3/21/23  Next treatment plan update due by: 6/20/23  Treatment plan was reviewed with patient at this visit.   Acknowledged consent of current treatment plan was not signed (d/t patient not being present in this session. Will review and sign at the next session the patient is present with us).     Please EPIC message with any questions or concerns.  PROVIDER: Reed Holley    Patient reviewed in supervision with Rene Miller who will sign the note.         Additional screening measures (Complete every 90 days)   DAMIÁN-7 and PHQ-9:  Last PHQ-9 3/3/2023   1.  Little interest or pleasure in doing things 0   2.  Feeling down, depressed, or hopeless 0   3.  Trouble falling or staying asleep, or sleeping too much 0   4.  Feeling tired or having little energy 0   5.  Poor appetite or overeating 0   6.  Feeling bad about yourself 0   7.  Trouble concentrating 0   8.  Moving slowly or restless 0   Q9: Thoughts of better off dead/self-harm past 2 weeks 0   PHQ-9 Total Score 0   Difficulty at work, home, or with people -     DAMIÁN-7  4/28/2021   1. Feeling nervous, anxious, or on edge 0   2. Not being able to stop or control worrying 0   3. Worrying too much about different things 0   4. Trouble relaxing 0   5. Being so restless that it is hard to sit still 0   6. Becoming  easily annoyed or irritable 0   7. Feeling afraid, as if something awful might happen 0   DAMIÁN-7 Total Score 0   If you checked any problems, how difficult have they made it for you to do your work, take care of things at home, or get along with other people? Not difficult at all       [facility] GAF:   Last completed on: NA  Next to be completed by: TBD    Practitioner Outcome Survey: Illness Management and Recovery  Last survey completed on: 3/14/23  Survey to be completed next: 6/14/23

## 2023-03-30 ENCOUNTER — VIRTUAL VISIT (OUTPATIENT)
Dept: PSYCHIATRY | Facility: CLINIC | Age: 23
End: 2023-03-30
Attending: PSYCHOLOGIST
Payer: MEDICAID

## 2023-03-30 DIAGNOSIS — F29 PSYCHOSIS, UNSPECIFIED PSYCHOSIS TYPE (H): Primary | ICD-10-CM

## 2023-03-30 PROCEDURE — 99207 PR NON-BILLABLE SERV PER CHARTING: CPT | Mod: VID

## 2023-03-30 NOTE — PROGRESS NOTES
[facility]  Psychiatry Clinic  Psychological Evaluation Testing Note     Amie Santiago MRN# [id number]   Age: 22 year old YOB: 2000     Date:  3/30/23    Video- Visit Details   Type of service:  video visit  Video start time: 1:05pm  Video end time: 1:50pm  Originating location (patient location):  [facility]   Location- Home  Distant Site (provider location): HIPAA compliant location Off-site  Platform used for video visit:  Secure real time interactive audio and visual telecommunication system via [employer]     Attendees: Patient attended the session alone  : No, English    Identifying Information/Reason for Referral  Amie Santiago is a 22 year old female who prefers the name Irene & pronouns she, her.  Irene has a history of psychosis [hallucinations- yes, delusions- yes].  The patient was seen for psychological testing. The purpose of the current psychological evaluation was to provide information about Irene's social, emotional and cognitive functioning, to assist with diagnostic clarification and to guide her treatment and recovery planning. Results of the following assessments are to be used in conjunction with the standard diagnostic evaluation.    A total of 45 minutes were spent in test administration and scoring by this writer, Lara Garvin psychometrist.  See Testing Evaluation Report for a full interpretation of the findings and data.     Summary of Services/Procedures  Irene was administered a psychological test battery tailored to her age and the referral questions.     Tests Administered  [place] Symptom Index  MIRECC Global Assessment of Functioning - sent to clinician  Illness Management and Recovery - Practitioner Rating - sent to clinician  Illness Management and Recovery - Self Rating  Neurocognitive Assessment (Test My Brain link sent)  [place] Symptom Severity Scale  Post Traumatic Stress Disorder Checklist  (PCL-5 8 Item)  Audit-C  DAST-10  PROMIS-Sleep Disturbance  International Physical Activity Questionnaire (IPAQ)  Behavioral Inhibition and Activation Scale- BIS/BAS  Brief [place]-28 Item  Life Event Checklist- LEC Intake  EPINET Core Assessment Battery Intake  [place] Depression Scale for Schizophrenia- CDSS  [place]-Suicide Severity Rating Scale Interview - Last Contact  Shared Decision Making in Clinical Encounters  Intersectional Discrimination Index  Stress Screener for Recent Events    Behavioral Observations  Overall, Irene demonstrated fair effort throughout testing. Therefore, the current evaluation results are thought to provide a accurate representation of her functioning in the areas assessed.     Plan  The patient and their invited support system will participate in a feedback appointment on Monday, April 10th with Rene Miller.     Will coordinate care with other treatment providers to assist with planning as needed.      Lara Garvin  Psychometrist      This is a non-billable encounter as it was solely for the purposes of completing initial assessments. Billing will occur during clinical interpretation and review of findings at a future date.

## 2023-04-10 ENCOUNTER — TELEPHONE (OUTPATIENT)
Dept: PSYCHIATRY | Facility: CLINIC | Age: 23
End: 2023-04-10
Payer: COMMERCIAL

## 2023-04-10 NOTE — TELEPHONE ENCOUNTER
[facility] Call Center    Phone Message    May a detailed message be left on voicemail: yes     Reason for Call: Other: Clarification of upcoming appointments      Caller stated she would like a call back because she has some questions regarding patient's upcoming appointments. She would like to know what some of the appointments are about, and they might need to move some appointment times. Caller asked to be called back at the home line, and it is OK to leave a voicemail message if they are unavailable.    Action Taken: Message routed to:  Other: [facility] Psychiatry [facility] providers    Travel Screening: Not Applicable

## 2023-04-11 ENCOUNTER — VIRTUAL VISIT (OUTPATIENT)
Dept: PSYCHIATRY | Facility: CLINIC | Age: 23
End: 2023-04-11
Attending: SOCIAL WORKER
Payer: COMMERCIAL

## 2023-04-11 ENCOUNTER — TELEPHONE (OUTPATIENT)
Dept: PSYCHIATRY | Facility: CLINIC | Age: 23
End: 2023-04-11
Payer: COMMERCIAL

## 2023-04-11 DIAGNOSIS — F19.959 SUBSTANCE-INDUCED PSYCHOTIC DISORDER (H): Primary | ICD-10-CM

## 2023-04-11 PROCEDURE — 90834 PSYTX W PT 45 MINUTES: CPT | Mod: HN

## 2023-04-11 NOTE — TELEPHONE ENCOUNTER
[facility] Psychiatry Clinic  First Episode of Psychosis - Strengths Program  Follow-up Contact    Patient Name: Amie Santiago  /Age:  2000 (22 year old)  Clinician: Natalya Black     Correspondence with: Mother    Reason for Follow-Up: Mother called wondering about appointments coming up and what they are.     Assessment/Progress note:  Explained to her mother what the appointments were. She was confused about all of the Thursday appointments, which were the YA group. Writer told her those are optional so if Irene is feeling overwhelmed she is not required to attend.    She mentioned that Irene started a new job so will likely not be starting group on . Writer provided her with information on the DBT group and Employment Drop- in group.    She was also wondering about switching the day of the week for May 18th Findings visit. Irene's new job will have her working  and she is hoping Irene won't have to take it off. Writer told her she will reach out to scheduling and see what they can do.     Plan/Referrals:    Connect with scheduling about findings visit on May 18th.     Provided with writer's contact information and availability.   Will route to patient's current psychiatric provider(s) as an FYI.     Please call or EPIC message with any questions or concerns.

## 2023-04-11 NOTE — PROGRESS NOTES
[facility]  Psychiatry Clinic  First Episode of Psychosis - Strengths Program  Clinician Contact & Progress Note   For Individual Resiliency Training (IRT) & Psychotherapy     Patient: Amie Santiago (2000)     MRN: [id number]  Diagnosis(es): Substance Induced psychosis  Clinician: Reed Holley  Service Type: Psychotherapy 38-52 minutes    Date:  4/11/23    Video- Visit Details   Type of service:  video visit  Video start time: 3:31pm  Video end time: 4:09pm  Originating location (patient location):  [facility]   Location- [place]  Distant Site (provider location): HIPAA compliant location Off-site  Platform used for video visit:  Secure real time interactive audio and visual telecommunication system via [employer]    People present:   Patient   Reed Holley     Intervention:  Motivational Interviewing   Connect info and skills with personal goals  Promote hope and positive expectations    Educational Teaching Strategies   Review of written material/education  Relate information to client's experience  Ask questions to check comprehension  Break down information into small chunks    CBT   Reinforcement and shaping (positive feedback for steps towards goals)  Reframe Cognitive Distortions (become aware of which distortions you are most vulnerable to, identifying and challenging our harmful automatic thoughts)  Pleasant Activity Scheduling (scheduling activities in the near future that you can look forward to, introduced more positivity and reduce negative thinking)  Recognizing the positive (Visualize, write, or discuss the best parts of the day to promote positive thinking patterns)      IRT Module(s) or topics addressed:  Module 3 - Education about Psychosis    Did the client complete the home practice option(s) from the previous session:   Completed    Techniques utilized:   [place] announced at beginning of session  Review of homework  Review of goal  Review of previous  meeting  Present new material  Identified urgent concerns  Assessed caregiver/family burden  Review of strengths, barriers, objectives, and interventions  Illicit client/family feedback    Mental Status Exam:  Alertness: alert   Appearance: well groomed  Behavior/Demeanor: cooperative, with good eye contact   Speech: normal  Language: good  Psychomotor: normal or unremarkable  Mood: description consistent with euthymia  Thought Process/Associations:  logical unremarkable  Thought Content:  no evidence of suicidal ideation or homicidal ideation, no evidence of psychotic thought, no auditory hallucinations present and no visual hallucinations present  Perception:  Reports none;  Denies none  Insight: good  Judgment: good  Cognition: does  appear grossly intact; formal cognitive testing was not done;       Assessment/Progress Note:   I met with Amie for an IRT session.  The focus of today's session was promoting return to functioning in emotional regulation, thought process and social relationships.  This writer utilized therapeutic techniques of Supportive, Motivational Interviewing, Insight-oriented and Interpersonal. Assessed symptom presence and potential triggers for the patient. This writer developed a prevention plan with Irene to identify warning signs, coping strategies and support system.   Identified Irene's symptoms since last visit as none. Explored additional strategies Amie can try to effectively cope with stress and manage symptoms including drawing art, and listening to music. Amie was open to trying newly discussed coping strategies. They did not identify urgent concerns needing to be addressed in today.      Patient did not report any changes to medications     Module 3: Education about Psychosis including discussing symptoms and common characteristics of psychosis, discussing coping with stress and identifying coping strategies as well as exploring strategies to continue building resilience  within Amie.    Overall Amie was cooperative throughout the session.  Helpful clinical techniques utilized during today's appointment appeared to be reflective listening and displaying empathy. Irene mentioned that she just started working and now she has two jobs.  As of today's appt insight to their mental illness appears good. She reports that she is almost getting done going to IOP and she was excited about it. Symptom assessment, safety assessment, discussion and identification of coping strategies, and exploration of material in IRT modules was all in support of Amie's self-identified goal(s) of as identified in most recent BEH Treatment Plan. Progress toward goal completion seems good.    With regards to safety, Amie reported the following:    Suicidal ideation: none. Plan: none. Urges: none. Intent: none.     Self-harm: Thoughts - none. Urges - none. Intent - none.    Homicidal or violent ideation: none. Specific individual(s): none. Plan: none. Urges: none. Intent: none.    [place] Protocol Risk Identification:  1) Have you wished you were dead or wished you could go to sleep and not wake up? No  2) Have you actually had any thoughts about killing yourself? No  If YES to 2, answer questions 3, 4, 5, 6  If NO to 2, go directly to question 6  3) Have you thought about how you might do this? No  4) Have you had any intension of acting on these thoughts of killing yourself, as opposed to you have the thoughts but you definitely would not act on them? No  5) Have you started to work out or worked out the details of how to kill yourself? Do you intend to carry out this plan? No  Always Ask Question 6  6) Have you done anything, started to do anything, or prepared to do anything to end your life? No  Examples: collected pills, obtained a gun, gave away valuables, wrote a will or suicide note, held a gun but changed your mind, cut yourself, tried to hang yourself, etc.    Discussed overall safety plan  should symptoms feel unmanageable or safety concerns become imminent to include: call family support, call . Amie was able to contract for safety.   Crisis Numbers:   Provided routinely in AVS     After hours:  [phone]    Plan/Referrals:   Home practice was identified as [facility]    Irene is participating in coordinated speciality care via the Strengths Program within our clinic.  Will meet with Amie weekly  for Individual Resiliency training, therapy, and support aimed at maximizing Amie's opportunity for recovery from psychosis.    Next session: 04/18/2023    Treatment plan last completed on: 3/21/2023  Next treatment plan update due by: 6/20/23  Treatment plan was reviewed with patient at this visit. (Detail why not if incomplete or not reviewed).   Acknowledged consent of current treatment plan was not signed (d/t patient not being present in this session. Will review and sign at the next session the patient is present with us).     Please EPIC message with any questions or concerns.  PROVIDER: Reed Holley    Patient reviewed in supervision with Rene Miller who will sign the note.         Additional screening measures (Complete every 90 days)   DAMIÁN-7 and PHQ-9:      3/3/2023     1:31 PM   Last PHQ-9   1.  Little interest or pleasure in doing things 0    0   2.  Feeling down, depressed, or hopeless 0    0   3.  Trouble falling or staying asleep, or sleeping too much 0    0   4.  Feeling tired or having little energy 0    0   5.  Poor appetite or overeating 0    0   6.  Feeling bad about yourself 0    0   7.  Trouble concentrating 0    0   8.  Moving slowly or restless 0    0   Q9: Thoughts of better off dead/self-harm past 2 weeks 0    0   PHQ-9 Total Score 0    0         4/28/2021     3:30 PM   DAMIÁN-7    1. Feeling nervous, anxious, or on edge 0   2. Not being able to stop or control worrying 0   3. Worrying too much about different things 0   4. Trouble relaxing 0   5. Being so restless that it is hard  to sit still 0   6. Becoming easily annoyed or irritable 0   7. Feeling afraid, as if something awful might happen 0   DAMIÁN-7 Total Score 0   If you checked any problems, how difficult have they made it for you to do your work, take care of things at home, or get along with other people? Not difficult at all       [facility] GAF:   Last completed on: 23  Next to be completed by: 23   Symptoms: 89   Occupational Functionin   Social Functionin    Practitioner Outcome Survey: Illness Management and Recovery  Last survey completed on: 3/14/2023  Survey to be completed next: 2023

## 2023-04-18 ENCOUNTER — VIRTUAL VISIT (OUTPATIENT)
Dept: PSYCHIATRY | Facility: CLINIC | Age: 23
End: 2023-04-18
Attending: SOCIAL WORKER
Payer: COMMERCIAL

## 2023-04-18 DIAGNOSIS — F29 PSYCHOSIS, UNSPECIFIED PSYCHOSIS TYPE (H): Primary | ICD-10-CM

## 2023-04-18 DIAGNOSIS — F33.41 MAJOR DEPRESSIVE DISORDER, RECURRENT EPISODE, IN PARTIAL REMISSION (H): ICD-10-CM

## 2023-04-18 PROCEDURE — 90846 FAMILY PSYTX W/O PT 50 MIN: CPT | Mod: VID

## 2023-04-18 PROCEDURE — 90832 PSYTX W PT 30 MINUTES: CPT | Mod: HN

## 2023-04-25 ENCOUNTER — VIRTUAL VISIT (OUTPATIENT)
Dept: PSYCHIATRY | Facility: CLINIC | Age: 23
End: 2023-04-25
Attending: SOCIAL WORKER
Payer: COMMERCIAL

## 2023-04-25 DIAGNOSIS — F33.41 MAJOR DEPRESSIVE DISORDER, RECURRENT EPISODE, IN PARTIAL REMISSION (H): ICD-10-CM

## 2023-04-25 DIAGNOSIS — F29 PSYCHOSIS, UNSPECIFIED PSYCHOSIS TYPE (H): Primary | ICD-10-CM

## 2023-04-25 PROCEDURE — 90832 PSYTX W PT 30 MINUTES: CPT | Mod: 95

## 2023-04-25 ASSESSMENT — PATIENT HEALTH QUESTIONNAIRE - PHQ9
10. IF YOU CHECKED OFF ANY PROBLEMS, HOW DIFFICULT HAVE THESE PROBLEMS MADE IT FOR YOU TO DO YOUR WORK, TAKE CARE OF THINGS AT HOME, OR GET ALONG WITH OTHER PEOPLE: NOT DIFFICULT AT ALL
SUM OF ALL RESPONSES TO PHQ QUESTIONS 1-9: 0
SUM OF ALL RESPONSES TO PHQ QUESTIONS 1-9: 0

## 2023-04-25 NOTE — PROGRESS NOTES
--  ATTESTATION:    I agree with this note and plan as documented. This patient is discussed with me in supervision; I did not see the patient directly.     Rene Miller, PhD, LP  --           [facility]  Psychiatry Clinic  First Episode of Psychosis - Strengths Program  Clinician Contact & Progress Note   For Individual Resiliency Training (IRT) & Psychotherapy     Patient: Amie Santiago (2000)     MRN: [id number]  Diagnosis(es): (F29) Psychosis, unspecified psychosis type (H)  (primary encounter diagnosis)  (F33.41) Major depressive disorder, recurrent episode, in partial remission (H)    Clinician: Reed Holley  Service Type: Psychotherapy 16-37 minutes    Date:  4/25/23    Video- Visit Details   Type of service:  video visit  Video start time: 3:30pm  Video end time: 4:03pm  Originating location (patient location):  [facility]   Location- Home  Distant Site (provider location): HIPAA compliant location Off-site  Platform used for video visit:  Secure real time interactive audio and visual telecommunication system via [employer]    People present:   Patient   Reed Holley     Intervention:  Motivational Interviewing   Connect info and skills with personal goals  Promote hope and positive expectations  Explore pros and cons of change    Educational Teaching Strategies   Review of written material/education  Relate information to client's experience  Ask questions to check comprehension  Break down information into small chunks    CBT   Reinforcement and shaping (positive feedback for steps towards goals)  Relapse prevention planning (review of stressors and early warning signs)  Reframe Cognitive Distortions (become aware of which distortions you are most vulnerable to, identifying and challenging our harmful automatic thoughts)  Pleasant Activity Scheduling (scheduling activities in the near future that you can look forward to, introduced more positivity and reduce negative  thinking)  Recognizing the positive (Visualize, write, or discuss the best parts of the day to promote positive thinking patterns)    IRT Module(s) or topics addressed:  Module 3 - Education about Psychosis    Did the client complete the home practice option(s) from the previous session:   Not Applicable    Techniques utilized:   [place] announced at beginning of session  Review of goal  Review of previous meeting  Present new material  Identified urgent concerns  Assessed caregiver/family burden  Review of strengths, barriers, objectives, and interventions  Illicit client/family feedback    Mental Status Exam:  Alertness: alert   Appearance: well groomed  Behavior/Demeanor: cooperative, with good eye contact   Speech: normal  Language: good  Psychomotor: normal or unremarkable  Mood: grandiose  Thought Process/Associations:  logical unremarkable  Thought Content:  no evidence of suicidal ideation or homicidal ideation, no evidence of psychotic thought, no auditory hallucinations present and no visual hallucinations present  Perception:  Reports none;  Denies none  Insight: good  Judgment: good  Cognition: does  appear grossly intact; formal cognitive testing was not done;       Assessment/Progress Note:   I met with Amie for an IRT session.  The focus of today's session was reducing active psychotic symptoms  and promoting return to functioning in emotional regulation, thought process and social relationships.  This writer utilized therapeutic techniques of Supportive, Motivational Interviewing and Insight-oriented. Assessed symptom presence and potential triggers for the patient.  Identified Irene's symptoms since last visit as none.  Amie shared that their current psychosocial stressors are work related. 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 Having getting done with IOP has freed her time more nad she wants to focus on working more and saving it up.   Discussed recently utilized coping strategies and techniques to include listening to music and drawing. Explored additional strategies Amie can try to effectively cope with stress and manage symptoms including drawing and listening to music. Amie was open to trying newly discussed coping strategies. They did not identify urgent concerns needing to be addressed in today.      Patient did not report any changes to medications     Module 3: Education about Psychosis including discussing symptoms and common characteristics of psychosis, reviewing medications commonly prescribed for psychosis, discussing coping with stress and identifying coping strategies as well as exploring strategies to continue building resilience within Amie.    Overall Amie was cooperative and engaged throughout the session.  This writer observed was excited about her job.  Helpful clinical techniques utilized during today's appointment appeared to be use of validation, and reflective listening.Irene shared that her relationship with her mother is on good track and feels like they have good communication.   As of today's appt insight to their mental illness appears adequate.  Symptom assessment, safety assessment, discussion and identification of coping strategies, and exploration of material in IRT modules was all in support of Amie's self-identified goal(s), as identified in most recent BEH Treatment Plan. Progress toward goal completion seems good.    With regards to safety, Amie reported the following:    Suicidal ideation: none. Plan: none. Urges: none. Intent: none.     Self-harm: Thoughts - none. Urges - none. Intent - none.    Homicidal or violent ideation: none. Specific individual(s): none. Plan: none. Urges: none. Intent: none.    [place] Protocol Risk Identification:  1) Have you wished you were dead or wished you could go to sleep and not wake up? No  2) Have you actually had any thoughts about killing yourself? No  If  YES to 2, answer questions 3, 4, 5, 6  If NO to 2, go directly to question 6  3) Have you thought about how you might do this? N/A  4) Have you had any intension of acting on these thoughts of killing yourself, as opposed to you have the thoughts but you definitely would not act on them? N/A  5) Have you started to work out or worked out the details of how to kill yourself? Do you intend to carry out this plan? N/A  Always Ask Question 6  6) Have you done anything, started to do anything, or prepared to do anything to end your life? No  Examples: collected pills, obtained a gun, gave away valuables, wrote a will or suicide note, held a gun but changed your mind, cut yourself, tried to hang yourself, etc.    Discussed overall safety plan should symptoms feel unmanageable or safety concerns become imminent to include: call crisis line/emergency or call family support. Amie was able to contract for safety.   Crisis Numbers:   Provided routinely in AVS     After hours:  [phone]    Plan/Referrals:   Home practice was identified as [facility] Bonilla is participating in coordinated speciality care via the Strengths Program within our clinic.  Will meet with Amie weekly  for Individual Resiliency training, therapy, and support aimed at maximizing Amie's opportunity for recovery from psychosis.    Next session: 5/2/23    Treatment plan last completed on: 3/21/23  Next treatment plan update due by: 6/20/23  Treatment plan was reviewed with patient at this visit. (Detail why not if incomplete or not reviewed).   Acknowledged consent of current treatment plan was not signed (d/t patient not being present in this session. Will review and sign at the next session the patient is present with us).     Please EPIC message with any questions or concerns.  PROVIDER: Reed Holley    Patient reviewed in supervision with Rene Miller who will sign the note.         Additional screening measures (Complete every 90 days)   DAMIÁN-7 and  PHQ-9:      5/2/2023     3:34 PM   Last PHQ-9   1.  Little interest or pleasure in doing things 0   2.  Feeling down, depressed, or hopeless 0   3.  Trouble falling or staying asleep, or sleeping too much 0   4.  Feeling tired or having little energy 0   5.  Poor appetite or overeating 0   6.  Feeling bad about yourself 0   7.  Trouble concentrating 0   8.  Moving slowly or restless 0   Q9: Thoughts of better off dead/self-harm past 2 weeks 0   PHQ-9 Total Score 0         4/28/2021     3:30 PM   DAMIÁN-7    1. Feeling nervous, anxious, or on edge 0   2. Not being able to stop or control worrying 0   3. Worrying too much about different things 0   4. Trouble relaxing 0   5. Being so restless that it is hard to sit still 0   6. Becoming easily annoyed or irritable 0   7. Feeling afraid, as if something awful might happen 0   DAMIÁN-7 Total Score 0   If you checked any problems, how difficult have they made it for you to do your work, take care of things at home, or get along with other people? Not difficult at all       [facility] GAF:   Last completed on: 4/11/23  Next to be completed by: 7/11/23  Practitioner Outcome Survey: Illness Management and Recovery  Last survey completed on: 3/14/23  Survey to be completed next: 6/14/23

## 2023-04-30 NOTE — PROGRESS NOTES
[facility]  Psychiatry Clinic  First Episode of Psychosis - Strengths Program  Clinician Contact & Progress Note   For Family Education Program     Patient: Amie Santiago (2000)     MRN: [id number]  Diagnosis(es): Psychosis, unspecified psychosis type (H) [F29]  Clinician: Jose Ventura  Service Type: 17049 Family Therapy without patient  Prolonged Care for this visit is not indicated.  Clinical work consists of family therapy.     Date:  4/18/23    Video- Visit Details   Type of service:  video visit  Video start time: 10:08am  Video end time: 11:00am  Originating location (patient location):  [facility]   Location- Home  Distant Site (provider location): HIPAA compliant location Off-site  Platform used for video visit:  Secure real time interactive audio and visual telecommunication system via [employer]    People present:   Family without patient present  Mother - Dierdre  Jose Ventura     Intervention:  Motivational Interviewing   Connect info and skills with personal goals  Promote hope and positive expectations  Explore pros and cons of change  Re-frame experiences in positive light    Educational Teaching Strategies   Review of written material/education  Relate information to client's experience  Ask questions to check comprehension  Break down information into small chunks  Adopt client's language     CBT   Reframe Cognitive Distortions (become aware of which distortions you are most vulnerable to, identifying and challenging our harmful automatic thoughts)  Play the Script Until the End (imagine the outcome of the worst case scenario, let the scenario play out, recognize that even if everything feared happens, it will likely turn out okay)  Behavioral Experiments (engage in a  what if  consideration, test existing beliefs  and/or help  test more adaptive beliefs, then modify unhelpful beliefs)  Pleasant Activity Scheduling (scheduling activities in the near  future that you can look forward to, introduced more positivity and reduce negative thinking)  Recognizing the positive (Visualize, write, or discuss the best parts of the day to promote positive thinking patterns)    Psychoeducational Topic(s) Addressed:  Family Education Orientation & Tip Sheet  Problem Solving  Crisis Intervention    Techniques utilized:   [place] announced at beginning of session  Review of goal  Present new material  Problem-solving practice  Help client choose a home practice option  Summarize progress made in current session  Identified urgent concerns  Assessed caregiver/family burden  Elicit client/family feedback    Assessment & progress:     The focus of today's session was introductions, orientation, and reviewing tip sheet. Irene was supposed to attend this session but was unable to be reached at the time of visit, instead session proceeded with 1:1 with Irene's mother Lizy. Assessed symptom presence and potential triggers for the patient.  Identified Irene's symptoms since last visit as poor sleep, impaired thinking, delusions and anxiety. They reported current psychosocial stressors are work related. Family reported no urgent concerns at the time of session. Patient did not report any changes to medications.    The session started with introductions with Lizy sharing her hopes for participating in Family Education Program (FEP). After introductions, the session continued with overview of Strength's team, their roles, and the coordinated speciality care model. This writer shared information in a conversational manner to promote discussion, share perspectives, and facilitate question/answer. Session continued with overview of FEP, the phases, modules, and timeline. After orientation was completed this writer and family discussed tip sheet with emphasis on crisis planning. Lizy reported experience contacting crisis in the past.    With regard to family dynamics, overall family seems  as if they are able to interact in a cooperative, pleasant and calm manner.  Helpful clinical techniques utilized during today's appointment appeared to be motivational interviewing, reflective listening, providing validation, and psychoeducation.  As of today's appt insight into [place] mental illness appears adequate.   Family would benefit from continued clinical intervention aimed at assisting them to implement helpful strategies at home and increase their understanding of psychosis.    Plan/Referrals:   Irene and her family are participating in coordinated speciality care via the Strengths Program within our clinic. Family did express interest in continuing to meet for family therapy and psychoeducation.  Home practice was identified as reviewing [facility].    Will meet with family bi-weekly for evidence based family psychoeducation and support aimed at maximizing Irene's opportunity for recovery from psychosis.      Crisis Numbers:   Provided routinely in AVS     After hours:  [phone]    Next session: KARYN Medel will follow up after consulting about scheduling with Irene    Treatment plan last completed on: N/A not completed at this session  Next treatment plan update due by: N/A  Treatment plan was not initiated and completed at this visit. Treatment plan will be initiated and completed at next session.   Acknowledged consent of current treatment plan was not signed.  Reviewed goals to increase problem solving techniques, communication patterns, and learn about the patient's psychotic experiences with their family.  We discussed relevant progress made, and ways family can help with these goals.      Please EPIC message with any questions or concerns.    PROVIDER: Jose Ventura [facility]    Patient staffed in supervision with Christelle Bell who will sign the note.  Supervisor is Christelle Bell.

## 2023-05-01 ENCOUNTER — VIRTUAL VISIT (OUTPATIENT)
Dept: PSYCHIATRY | Facility: CLINIC | Age: 23
End: 2023-05-01
Attending: SOCIAL WORKER
Payer: COMMERCIAL

## 2023-05-01 DIAGNOSIS — F29 PSYCHOSIS, UNSPECIFIED PSYCHOSIS TYPE (H): Primary | ICD-10-CM

## 2023-05-01 PROCEDURE — 90847 FAMILY PSYTX W/PT 50 MIN: CPT | Mod: HN

## 2023-05-02 ENCOUNTER — VIRTUAL VISIT (OUTPATIENT)
Dept: PSYCHIATRY | Facility: CLINIC | Age: 23
End: 2023-05-02
Attending: SOCIAL WORKER
Payer: COMMERCIAL

## 2023-05-02 DIAGNOSIS — F33.41 MAJOR DEPRESSIVE DISORDER, RECURRENT EPISODE, IN PARTIAL REMISSION (H): ICD-10-CM

## 2023-05-02 DIAGNOSIS — F29 PSYCHOSIS, UNSPECIFIED PSYCHOSIS TYPE (H): Primary | ICD-10-CM

## 2023-05-02 PROCEDURE — 90832 PSYTX W PT 30 MINUTES: CPT | Mod: HN

## 2023-05-04 NOTE — PROGRESS NOTES
--  ATTESTATION:    I agree with this note and plan as documented. This patient is discussed with me in supervision; I did not see the patient directly.     Rene Miller, PhD, LP  --           [facility]  Psychiatry Clinic  First Episode of Psychosis - Strengths Program  Clinician Contact & Progress Note   For Individual Resiliency Training (IRT) & Psychotherapy     Patient: Amie Santiago (2000)     MRN: [id number]  Diagnosis(es): (F29) Psychosis, unspecified psychosis type (H)  (primary encounter diagnosis)  (F33.41) Major depressive disorder, recurrent episode, in partial remission (H)  Clinician: Reed Holley  Service Type: Psychotherapy 16-37 minutes    Date:  4/18/23    Video- Visit Details   Type of service:  video visit  Video start time: 3:30pm  Video end time: 4:03pm  Originating location (patient location):  [facility]   Location- Home  Distant Site (provider location): HIPAA compliant location Off-site  Platform used for video visit:  Secure real time interactive audio and visual telecommunication system via [employer]    People present:   Patient   Reed Holley     Intervention:  Motivational Interviewing   Connect info and skills with personal goals  Promote hope and positive expectations  Explore pros and cons of change    Educational Teaching Strategies   Review of written material/education  Relate information to client's experience  Ask questions to check comprehension  Break down information into small chunks    CBT   Reinforcement and shaping (positive feedback for steps towards goals)  Relapse prevention planning (review of stressors and early warning signs)  Coping skills training (review current coping skills)  Reframe Cognitive Distortions (become aware of which distortions you are most vulnerable to, identifying and challenging our harmful automatic thoughts)  Play the Script Until the End (imagine the outcome of the worst case scenario, let the scenario  play out, recognize that even if everything feared happens, it will likely turn out okay)  Behavioral Experiments (engage in a  what if  consideration, test existing beliefs  and/or help  test more adaptive beliefs, then modify unhelpful beliefs)  Pleasant Activity Scheduling (scheduling activities in the near future that you can look forward to, introduced more positivity and reduce negative thinking)  Recognizing the positive (Visualize, write, or discuss the best parts of the day to promote positive thinking patterns)      IRT Module(s) or topics addressed:  Module 3 - Education about Psychosis    Did the client complete the home practice option(s) from the previous session:   Not Applicable    Techniques utilized:   [place] announced at beginning of session  Review of homework  Review of previous meeting  Present new material  Identified urgent concerns  Assessed caregiver/family burden  Review of strengths, barriers, objectives, and interventions  Illicit client/family feedback    Mental Status Exam:  Alertness: alert   Appearance: awake, alert  Behavior/Demeanor: cooperative, with good eye contact   Speech: normal  Language: good  Psychomotor: normal or unremarkable  Mood: grandiose  Thought Process/Associations:  linear unremarkable  Thought Content:  no evidence of suicidal ideation or homicidal ideation, no evidence of psychotic thought, no auditory hallucinations present and no visual hallucinations present  Perception:  Reports none;  Denies none  Insight: good  Judgment: good  Cognition: does  appear grossly intact; formal cognitive testing was not done;       Assessment/Progress Note:   I met with Amie for an IRT session.  The focus of today's session was psychoeducation about substance use and psychosis.  This writer utilized therapeutic techniques of Cognitive-Behavioral, Motivational Interviewing, Insight-oriented and Interpersonal. Assessed symptom presence and potential triggers for the patient.   Identified Irene's symptoms since last visit as none. Irene shared that their current psychosocial stressors to being figuring out her schedule for both her jobs. She is also expressed excitement about meeting her coworkers.  Discussed recently utilized coping strategies and techniques to include . 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 Explored additional strategies Amie can try to effectively cope with stress and manage symptoms including going to the gym, and drawing. Amie was open to trying newly discussed coping strategies. They did not identify urgent concerns needing to be addressed in today.       Patient did not report any changes to medications     Module 3: Education about Psychosis including discussing symptoms and common characteristics of psychosis, reviewing medications commonly prescribed for psychosis, discussing coping with stress and identifying coping strategies as well as exploring strategies to continue building resilience within Amie.      Overall Amie was cooperative and engaged throughout the session.  This writer observed a bit tired, to which client shared that she had a long day.  Helpful clinical techniques utilized during today's appointment appeared to be reflective listening, open ended questions and pyschoeducation. 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  As of today's appt insight to their mental illness appears good.  Symptom assessment, safety assessment, discussion and identification of coping strategies, and exploration of material in IRT modules was all in support of Amie's self-identified goal(s), as identified in most recent BEH Treatment Plan. Progress toward goal completion seems good.    With regards to safety, Amie reported the  following:    Suicidal ideation: none. Plan: none. Urges: none. Intent: none.     Self-harm: Thoughts - none. Urges - none. Intent - none.    Homicidal or violent ideation: none. Specific individual(s): none. Plan: none. Urges: none. Intent: none.    [place] Protocol Risk Identification:  1) Have you wished you were dead or wished you could go to sleep and not wake up? No  2) Have you actually had any thoughts about killing yourself? No  If YES to 2, answer questions 3, 4, 5, 6  If NO to 2, go directly to question 6  3) Have you thought about how you might do this? N/A  4) Have you had any intension of acting on these thoughts of killing yourself, as opposed to you have the thoughts but you definitely would not act on them? N/A  5) Have you started to work out or worked out the details of how to kill yourself? Do you intend to carry out this plan? N/A  Always Ask Question 6  6) Have you done anything, started to do anything, or prepared to do anything to end your life? No  Examples: collected pills, obtained a gun, gave away valuables, wrote a will or suicide note, held a gun but changed your mind, cut yourself, tried to hang yourself, etc.    Discussed overall safety plan should symptoms feel unmanageable or safety concerns become imminent to include: call crisis/emergency or call support/family. Amie was able to contract for safety.   Crisis Numbers:   Provided routinely in AVS     After hours:  [phone]    Plan/Referrals:   Home practice was identified as write negative use of substance     Irene is participating in coordinated speciality care via the Strengths Program within our clinic.  Will meet with Amie weekly  for Individual Resiliency training, therapy, and support aimed at maximizing Amie's opportunity for recovery from psychosis.    Next session: 4/25/23    Treatment plan last completed on: 3/21/23  Next treatment plan update due by: 6/20/23  Treatment plan was reviewed with patient at this  visit. (Detail why not if incomplete or not reviewed).   Acknowledged consent of current treatment plan was not signed (d/t patient not being present in this session. Will review and sign at the next session the patient is present with us).     Please EPIC message with any questions or concerns.  PROVIDER: Reed Holley    Patient reviewed in supervision with Rene Miller who will sign the note.         Additional screening measures (Complete every 90 days)   DAMIÁN-7 and PHQ-9:]      5/2/2023     3:34 PM   Last PHQ-9   1.  Little interest or pleasure in doing things 0   2.  Feeling down, depressed, or hopeless 0   3.  Trouble falling or staying asleep, or sleeping too much 0   4.  Feeling tired or having little energy 0   5.  Poor appetite or overeating 0   6.  Feeling bad about yourself 0   7.  Trouble concentrating 0   8.  Moving slowly or restless 0   Q9: Thoughts of better off dead/self-harm past 2 weeks 0   PHQ-9 Total Score 0         4/28/2021     3:30 PM   DAMIÁN-7    1. Feeling nervous, anxious, or on edge 0   2. Not being able to stop or control worrying 0   3. Worrying too much about different things 0   4. Trouble relaxing 0   5. Being so restless that it is hard to sit still 0   6. Becoming easily annoyed or irritable 0   7. Feeling afraid, as if something awful might happen 0   DAMIÁN-7 Total Score 0   If you checked any problems, how difficult have they made it for you to do your work, take care of things at home, or get along with other people? Not difficult at all       [facility] GAF:   Last completed on: 4/11/23  Next to be completed by: 7/11/23  Practitioner Outcome Survey: Illness Management and Recovery  Last survey completed on: 3/14/23  Survey to be completed next: 6/14/23

## 2023-05-04 NOTE — PROGRESS NOTES
--  ATTESTATION:    I agree with this note and plan as documented. This patient is discussed with me in supervision; I did not see the patient directly.     Rene Miller, PhD, LP  --         [facility]  Psychiatry Clinic  First Episode of Psychosis - Strengths Program  Clinician Contact & Progress Note   For Individual Resiliency Training (IRT) & Psychotherapy     Patient: Amie Santiago (2000)     MRN: [id number]  Diagnosis(es): (F29) Psychosis, unspecified psychosis type (H)  (primary encounter diagnosis)  (F33.41) Major depressive disorder, recurrent episode, in partial remission (H)      Clinician: Reed Holley  Service Type: Psychotherapy 16-37 minutes    Date:  5/02/23    Video- Visit Details   Type of service:  video visit  Video start time: 3:31pm  Video end time: 4:03pm  Originating location (patient location):  [facility]   Location- Home  Distant Site (provider location): HIPAA compliant location Off-site  Platform used for video visit:  Secure real time interactive audio and visual telecommunication system via [email or web address]    People present:   Patient   Reed Holley     Intervention:  Motivational Interviewing   Connect info and skills with personal goals  Promote hope and positive expectations  Explore pros and cons of change    Educational Teaching Strategies   Review of written material/education  Relate information to client's experience  Ask questions to check comprehension    CBT   Reinforcement and shaping (positive feedback for steps towards goals)  Relapse prevention planning (review of stressors)  Coping skills training (review current coping skills)  Cognitive restructuring (identify thoughts related to negative feelings)  Reframe Cognitive Distortions (become aware of which distortions you are most vulnerable to, identifying and challenging our harmful automatic thoughts)  Play the Script Until the End (imagine the outcome of the worst case scenario, let  the scenario play out, recognize that even if everything feared happens, it will likely turn out okay)  Behavioral Experiments (engage in a  what if  consideration, test existing beliefs  and/or help  test more adaptive beliefs, then modify unhelpful beliefs)  Pleasant Activity Scheduling (scheduling activities in the near future that you can look forward to, introduced more positivity and reduce negative thinking)  Recognizing the positive (Visualize, write, or discuss the best parts of the day to promote positive thinking patterns)      IRT Module(s) or topics addressed:  Module 3 - Education about Psychosis    Did the client complete the home practice option(s) from the previous session:   Partially Completed    Techniques utilized:   [place] announced at beginning of session  Review of homework  Review of goal  Review of previous meeting  Present new material  Help client choose a home practice option  Identified urgent concerns  Assessed caregiver/family burden  Review of strengths, barriers, objectives, and interventions  Illicit client/family feedback    Mental Status Exam:  Alertness: alert   Appearance: awake, alert  Behavior/Demeanor: cooperative, with adequate eye contact   Speech: increased latency of response  Language: intact, no problems and good  Psychomotor: normal or unremarkable  Mood: grandiose  Thought Process/Associations:  logical unremarkable  Thought Content:  no evidence of suicidal ideation or homicidal ideation, no evidence of psychotic thought, no auditory hallucinations present and no visual hallucinations present  Perception:  Reports none;  Denies none  Insight: good  Judgment: good  Cognition: does  appear grossly intact; formal cognitive testing was not done;       Assessment/Progress Note:   I met with Amie for an IRT session.  The focus of today's session was promoting medication adherence and psycho-education about medication.  This writer utilized therapeutic techniques of  Cognitive-Behavioral, Motivational Interviewing and Insight-oriented. Assessed symptom presence and potential triggers for the patient.  Identified Irene's symptoms since last visit as none.  Amie shared that their current psychosocial stressors are none. Irene shared her experiences on taking medications, she says that it had great affect on her mood being stabilized, however she has been feeling like taking the Lexapro has been making her more tired and depressed. The writer advised Irene to communicate her concerns/questionst to her prescriber and helped her formulate her questions and concerns.    Discussed recently utilized coping strategies and techniques to include going to the gym and listening to music. Explored additional strategies Amie can try to effectively cope with stress and manage symptoms including talking to others, and going to the gym. Amie was open to trying newly discussed coping strategies. They did identify urgent concerns needing to be addressed in today.      Patient did not report any changes to medications     Module 3: Education about Psychosis including discussing symptoms and common characteristics of psychosis, reviewing medications commonly prescribed for psychosis, discussing coping with stress and identifying coping strategies as well as exploring strategies to continue building resilience within Amie.      Overall Amie was cooperative and engaged throughout the session.  This writer observed more energetic. Irene reports that she went to the gym in the morning and has been trying to make it a habit since it is one of her coping mechanism.  Helpful clinical techniques utilized during today's appointment appeared to be active listening, positive support and validation.  As of today's appt insight to their mental illness appears good.  Symptom assessment, safety assessment, discussion and identification of coping strategies, and exploration of material in IRT modules was all  in support of Amie's self-identified goal(s) as identified in most recent BEH Treatment Plan. Progress toward goal completion seems good.    With regards to safety, Amie reported the following:    Suicidal ideation: none. Plan: none. Urges: none. Intent: none.     Self-harm: Thoughts - none. Urges - none. Intent - none.    Homicidal or violent ideation: none. Specific individual(s): none. Plan: none. Urges: none. Intent: none.    [place] Protocol Risk Identification:  1) Have you wished you were dead or wished you could go to sleep and not wake up? No  2) Have you actually had any thoughts about killing yourself? No  If YES to 2, answer questions 3, 4, 5, 6  If NO to 2, go directly to question 6  3) Have you thought about how you might do this? N/A  4) Have you had any intension of acting on these thoughts of killing yourself, as opposed to you have the thoughts but you definitely would not act on them? N/A  5) Have you started to work out or worked out the details of how to kill yourself? Do you intend to carry out this plan? N/A  Always Ask Question 6  6) Have you done anything, started to do anything, or prepared to do anything to end your life? No  Examples: collected pills, obtained a gun, gave away valuables, wrote a will or suicide note, held a gun but changed your mind, cut yourself, tried to hang yourself, etc.    Discussed overall safety plan should symptoms feel unmanageable or safety concerns become imminent to include: call crisis line/emergency or call support/family. Amie was able to contract for safety.   Crisis Numbers:   Provided routinely in AVS     After hours:  [phone]    Plan/Referrals:   Home practice was identified as NA.    Irene is participating in coordinated speciality care via the Strengths Program within our clinic.  Will meet with Amie weekly  for Individual Resiliency training, therapy, and support aimed at maximizing Amie's opportunity for recovery from psychosis.     Next session: 4/9/23    Treatment plan last completed on: 3/21/23  Next treatment plan update due by: 6/20/23  Treatment plan was reviewed with patient at this visit. (Detail why not if incomplete or not reviewed).   Acknowledged consent of current treatment plan was not signed (d/t patient not being present in this session. Will review and sign at the next session the patient is present with us).     Please EPIC message with any questions or concerns.  PROVIDER: Reed Holley    Patient reviewed in supervision with Rene Miller who will sign the note.         Additional screening measures (Complete every 90 days)   DAMIÁN-7 and PHQ-9:      5/2/2023     3:34 PM   Last PHQ-9   1.  Little interest or pleasure in doing things 0   2.  Feeling down, depressed, or hopeless 0   3.  Trouble falling or staying asleep, or sleeping too much 0   4.  Feeling tired or having little energy 0   5.  Poor appetite or overeating 0   6.  Feeling bad about yourself 0   7.  Trouble concentrating 0   8.  Moving slowly or restless 0   Q9: Thoughts of better off dead/self-harm past 2 weeks 0   PHQ-9 Total Score 0         4/28/2021     3:30 PM   DAMIÁN-7    1. Feeling nervous, anxious, or on edge 0   2. Not being able to stop or control worrying 0   3. Worrying too much about different things 0   4. Trouble relaxing 0   5. Being so restless that it is hard to sit still 0   6. Becoming easily annoyed or irritable 0   7. Feeling afraid, as if something awful might happen 0   DAMIÁN-7 Total Score 0   If you checked any problems, how difficult have they made it for you to do your work, take care of things at home, or get along with other people? Not difficult at all       [facility] GAF:   Last completed on: 4/11/23  Next to be completed by: 7/11/23  Practitioner Outcome Survey: Illness Management and Recovery  Last survey completed on: 3/14/23  Survey to be completed next: 6/14/23

## 2023-05-09 ENCOUNTER — VIRTUAL VISIT (OUTPATIENT)
Dept: PSYCHIATRY | Facility: CLINIC | Age: 23
End: 2023-05-09
Attending: SOCIAL WORKER
Payer: COMMERCIAL

## 2023-05-09 ENCOUNTER — OFFICE VISIT (OUTPATIENT)
Dept: FAMILY MEDICINE | Facility: CLINIC | Age: 23
End: 2023-05-09
Payer: COMMERCIAL

## 2023-05-09 VITALS
RESPIRATION RATE: 20 BRPM | OXYGEN SATURATION: 96 % | SYSTOLIC BLOOD PRESSURE: 90 MMHG | BODY MASS INDEX: 22.07 KG/M2 | HEART RATE: 88 BPM | DIASTOLIC BLOOD PRESSURE: 60 MMHG | HEIGHT: 62 IN | WEIGHT: 119.9 LBS | TEMPERATURE: 97.8 F

## 2023-05-09 DIAGNOSIS — Z12.4 SCREENING FOR MALIGNANT NEOPLASM OF CERVIX: ICD-10-CM

## 2023-05-09 DIAGNOSIS — Z00.00 ROUTINE ADULT HEALTH MAINTENANCE: Primary | ICD-10-CM

## 2023-05-09 DIAGNOSIS — F33.9 EPISODE OF RECURRENT MAJOR DEPRESSIVE DISORDER, UNSPECIFIED DEPRESSION EPISODE SEVERITY (H): ICD-10-CM

## 2023-05-09 DIAGNOSIS — Z11.3 ENCNTR SCREEN FOR INFECTIONS W SEXL MODE OF TRANSMISS: ICD-10-CM

## 2023-05-09 DIAGNOSIS — Z13.29 SCREENING FOR ENDOCRINE, METABOLIC AND IMMUNITY DISORDER: ICD-10-CM

## 2023-05-09 DIAGNOSIS — Z11.4 SCREENING FOR HUMAN IMMUNODEFICIENCY VIRUS: ICD-10-CM

## 2023-05-09 DIAGNOSIS — Z13.228 SCREENING FOR ENDOCRINE, METABOLIC AND IMMUNITY DISORDER: ICD-10-CM

## 2023-05-09 DIAGNOSIS — Z13.0 SCREENING FOR ENDOCRINE, METABOLIC AND IMMUNITY DISORDER: ICD-10-CM

## 2023-05-09 DIAGNOSIS — F29 PSYCHOSIS, UNSPECIFIED PSYCHOSIS TYPE (H): Primary | ICD-10-CM

## 2023-05-09 DIAGNOSIS — Z13.0 SCREENING FOR DEFICIENCY ANEMIA: ICD-10-CM

## 2023-05-09 LAB
ERYTHROCYTE [DISTWIDTH] IN BLOOD BY AUTOMATED COUNT: 12.3 % (ref 10–15)
HCT VFR BLD AUTO: 39.9 % (ref 35–47)
HGB BLD-MCNC: 13.5 G/DL (ref 11.7–15.7)
MCH RBC QN AUTO: 30.1 PG (ref 26.5–33)
MCHC RBC AUTO-ENTMCNC: 33.8 G/DL (ref 31.5–36.5)
MCV RBC AUTO: 89 FL (ref 78–100)
PLATELET # BLD AUTO: 250 10E3/UL (ref 150–450)
RBC # BLD AUTO: 4.49 10E6/UL (ref 3.8–5.2)
WBC # BLD AUTO: 5.7 10E3/UL (ref 4–11)

## 2023-05-09 PROCEDURE — 96127 BRIEF EMOTIONAL/BEHAV ASSMT: CPT | Performed by: PHYSICIAN ASSISTANT

## 2023-05-09 PROCEDURE — 99395 PREV VISIT EST AGE 18-39: CPT | Mod: 25 | Performed by: PHYSICIAN ASSISTANT

## 2023-05-09 PROCEDURE — 87389 HIV-1 AG W/HIV-1&-2 AB AG IA: CPT | Performed by: PHYSICIAN ASSISTANT

## 2023-05-09 PROCEDURE — 86780 TREPONEMA PALLIDUM: CPT | Performed by: PHYSICIAN ASSISTANT

## 2023-05-09 PROCEDURE — 80048 BASIC METABOLIC PNL TOTAL CA: CPT | Performed by: PHYSICIAN ASSISTANT

## 2023-05-09 PROCEDURE — 99214 OFFICE O/P EST MOD 30 MIN: CPT | Mod: 25 | Performed by: PHYSICIAN ASSISTANT

## 2023-05-09 PROCEDURE — 87491 CHLMYD TRACH DNA AMP PROBE: CPT | Performed by: PHYSICIAN ASSISTANT

## 2023-05-09 PROCEDURE — 90832 PSYTX W PT 30 MINUTES: CPT | Mod: VID

## 2023-05-09 PROCEDURE — 36415 COLL VENOUS BLD VENIPUNCTURE: CPT | Performed by: PHYSICIAN ASSISTANT

## 2023-05-09 PROCEDURE — G0145 SCR C/V CYTO,THINLAYER,RESCR: HCPCS | Performed by: PHYSICIAN ASSISTANT

## 2023-05-09 PROCEDURE — 85027 COMPLETE CBC AUTOMATED: CPT | Performed by: PHYSICIAN ASSISTANT

## 2023-05-09 PROCEDURE — 87591 N.GONORRHOEAE DNA AMP PROB: CPT | Performed by: PHYSICIAN ASSISTANT

## 2023-05-09 RX ORDER — OLANZAPINE 10 MG/1
10 TABLET ORAL AT BEDTIME
Qty: 90 TABLET | Refills: 0 | Status: SHIPPED | OUTPATIENT
Start: 2023-05-09 | End: 2023-08-07

## 2023-05-09 RX ORDER — ESCITALOPRAM OXALATE 20 MG/1
20 TABLET ORAL DAILY
Qty: 90 TABLET | Refills: 0 | Status: SHIPPED | OUTPATIENT
Start: 2023-05-09 | End: 2023-05-18

## 2023-05-09 ASSESSMENT — ENCOUNTER SYMPTOMS
HEMATOCHEZIA: 0
PALPITATIONS: 0
NERVOUS/ANXIOUS: 0
JOINT SWELLING: 0
MYALGIAS: 0
SORE THROAT: 0
HEMATURIA: 0
CONSTIPATION: 0
NAUSEA: 0
HEADACHES: 0
FEVER: 0
DYSURIA: 0
CHILLS: 0
ARTHRALGIAS: 0
HEARTBURN: 0
EYE PAIN: 0
FREQUENCY: 0
DIZZINESS: 0
PARESTHESIAS: 0
ABDOMINAL PAIN: 0
COUGH: 0
WEAKNESS: 0
SHORTNESS OF BREATH: 0
BREAST MASS: 0
DIARRHEA: 0

## 2023-05-09 ASSESSMENT — PAIN SCALES - GENERAL: PAINLEVEL: NO PAIN (0)

## 2023-05-09 ASSESSMENT — PATIENT HEALTH QUESTIONNAIRE - PHQ9: SUM OF ALL RESPONSES TO PHQ QUESTIONS 1-9: 2

## 2023-05-09 NOTE — PROGRESS NOTES
SUBJECTIVE:   CC: Irene is an 22 year old who presents for preventive health visit.       5/9/2023     1:37 PM   Additional Questions   Roomed by Shanell Cuevas   Accompanied by alone         5/9/2023     1:37 PM   Patient Reported Additional Medications   Patient reports taking the following new medications none   Patient has been advised of split billing requirements and indicates understanding: Yes  Healthy Habits:     Getting at least 3 servings of Calcium per day:  NO    Bi-annual eye exam:  NO    Dental care twice a year:  Yes    Sleep apnea or symptoms of sleep apnea:  None    Diet:  Regular (no restrictions) and Gluten-free/reduced    Frequency of exercise:  4-5 days/week    Duration of exercise:  30-45 minutes    Taking medications regularly:  Yes    Medication side effects:  None    PHQ-2 Total Score: 0    Additional concerns today:  No    Slight dose increase from 10mg to 15mg in March, not noticing any difference  Doesn't feel like escitalopram is working as well as it used to - doesn't seem as happy  Follows with therapy weekly    Today's PHQ-2 Score:       5/9/2023     1:24 PM   PHQ-2 ( 1999 [employer])   Q1: Little interest or pleasure in doing things 0   Q2: Feeling down, depressed or hopeless 0   PHQ-2 Score 0   Q1: Little interest or pleasure in doing things Not at all   Q2: Feeling down, depressed or hopeless Not at all   PHQ-2 Score 0     Have you ever done Advance Care Planning? (For example, a Health Directive, POLST, or a discussion with a medical provider or your loved ones about your wishes): No, advance care planning information given to patient to review.  Patient plans to discuss their wishes with loved ones or provider.      Social History     Tobacco Use     Smoking status: Never     Smokeless tobacco: Never   Vaping Use     Vaping status: Former   Substance Use Topics     Alcohol use: Never     Alcohol/week: 0.0 standard drinks of alcohol           5/9/2023     1:24 PM   Alcohol Use  "  Prescreen: >3 drinks/day or >7 drinks/week? No     Reviewed orders with patient.  Reviewed health maintenance and updated orders accordingly - Yes    Breast Cancer Screening:    History of abnormal Pap smear: NO - age 21-29 PAP every 3 years recommended     Reviewed and updated as needed this visit by clinical staff   Tobacco  Allergies  Meds   Med Hx  Surg Hx   Soc Hx        Reviewed and updated as needed this visit by Provider   Tobacco     Med Hx  Surg Hx   Soc Hx         Review of Systems   Constitutional: Negative for chills and fever.   HENT: Negative for congestion, ear pain, hearing loss and sore throat.    Eyes: Negative for pain and visual disturbance.   Respiratory: Negative for cough and shortness of breath.    Cardiovascular: Negative for chest pain, palpitations and peripheral edema.   Gastrointestinal: Negative for abdominal pain, constipation, diarrhea, heartburn, hematochezia and nausea.   Breasts:  Negative for tenderness, breast mass and discharge.   Genitourinary: Negative for dysuria, frequency, genital sores, hematuria, pelvic pain, urgency, vaginal bleeding and vaginal discharge.   Musculoskeletal: Negative for arthralgias, joint swelling and myalgias.   Skin: Negative for rash.   Neurological: Negative for dizziness, weakness, headaches and paresthesias.   Psychiatric/Behavioral: Negative for mood changes. The patient is not nervous/anxious.         OBJECTIVE:   BP 90/60 (BP Location: Right arm, Patient Position: Sitting, Cuff Size: Adult Regular)   Pulse 88   Temp 97.8  F (36.6  C) (Temporal)   Resp 20   Ht 1.575 m (5' 2\")   Wt 54.4 kg (119 lb 14.4 oz)   SpO2 96%   BMI 21.93 kg/m    Physical Exam  GENERAL: healthy, alert and no distress  EYES: Eyes grossly normal to inspection, PERRL and conjunctivae and sclerae normal  HENT: ear canals and TM's normal, nose and mouth without ulcers or lesions  NECK: no adenopathy, no asymmetry, masses, or scars and thyroid normal to " palpation  RESP: lungs clear to auscultation - no rales, rhonchi or wheezes  CV: regular rate and rhythm, normal S1 S2, no S3 or S4, no murmur, click or rub, no peripheral edema and peripheral pulses strong  ABDOMEN: soft, nontender, no hepatosplenomegaly, no masses and bowel sounds normal   (female): External genitalia normal, urethra normal, vagina normal, cervix normal without lesions. Pap collected.  MS: no gross musculoskeletal defects noted, no edema  SKIN: no suspicious lesions or rashes  NEURO: Normal strength and tone, mentation intact and speech normal  PSYCH: mentation appears normal, affect normal/bright      ASSESSMENT/PLAN:   Amie was seen today for physical and establish care.    Diagnoses and all orders for this visit:    Routine adult health maintenance  -     REVIEW OF HEALTH MAINTENANCE PROTOCOL ORDERS    Episode of recurrent major depressive disorder, unspecified depression episode severity (H) - will increase further to 20mg every day. Follow up in 6-8 weeks to verify improvement.  -     escitalopram (LEXAPRO) 20 MG tablet; Take 1 tablet (20 mg) by mouth daily  -     OLANZapine (ZYPREXA) 10 MG tablet; Take 1 tablet (10 mg) by mouth At Bedtime  -     PRIMARY CARE FOLLOW-UP SCHEDULING; Future    Screening for deficiency anemia  -     CBC with platelets; Future    Screening for endocrine, metabolic and immunity disorder  -     Basic metabolic panel; Future    Screening for malignant neoplasm of cervix  -     Pap screen only - recommended age 21 - 24 years    Encntr screen for infections w sexl mode of transmiss  -     Chlamydia trachomatis/Neisseria gonorrhoeae by PCR  -     Treponema Abs w Reflex to RPR and Titer; Future    Screening for human immunodeficiency virus  -     HIV Antigen Antibody Combo; Future      COUNSELING:  Reviewed preventive health counseling, as reflected in patient instructions        She reports that she has never smoked. She has never used smokeless tobacco.    Daisha  SHEA Varela  [facility]

## 2023-05-10 LAB
ANION GAP SERPL CALCULATED.3IONS-SCNC: 12 MMOL/L (ref 7–15)
BUN SERPL-MCNC: 13.9 MG/DL (ref 6–20)
C TRACH DNA SPEC QL PROBE+SIG AMP: NEGATIVE
CALCIUM SERPL-MCNC: 9.5 MG/DL (ref 8.6–10)
CHLORIDE SERPL-SCNC: 104 MMOL/L (ref 98–107)
CREAT SERPL-MCNC: 0.83 MG/DL (ref 0.51–0.95)
DEPRECATED HCO3 PLAS-SCNC: 24 MMOL/L (ref 22–29)
GFR SERPL CREATININE-BSD FRML MDRD: >90 ML/MIN/1.73M2
GLUCOSE SERPL-MCNC: 93 MG/DL (ref 70–99)
HIV 1+2 AB+HIV1 P24 AG SERPL QL IA: NONREACTIVE
N GONORRHOEA DNA SPEC QL NAA+PROBE: NEGATIVE
POTASSIUM SERPL-SCNC: 4.2 MMOL/L (ref 3.4–5.3)
SODIUM SERPL-SCNC: 140 MMOL/L (ref 136–145)
T PALLIDUM AB SER QL: NONREACTIVE

## 2023-05-11 ENCOUNTER — VIRTUAL VISIT (OUTPATIENT)
Dept: PSYCHIATRY | Facility: CLINIC | Age: 23
End: 2023-05-11
Attending: PSYCHIATRY & NEUROLOGY
Payer: COMMERCIAL

## 2023-05-11 DIAGNOSIS — F29 PSYCHOSIS, UNSPECIFIED PSYCHOSIS TYPE (H): Primary | ICD-10-CM

## 2023-05-11 LAB
BKR LAB AP GYN ADEQUACY: NORMAL
BKR LAB AP GYN INTERPRETATION: NORMAL
BKR LAB AP HPV REFLEX: NO
BKR LAB AP PREVIOUS ABNORMAL: NORMAL
PATH REPORT.COMMENTS IMP SPEC: NORMAL
PATH REPORT.COMMENTS IMP SPEC: NORMAL
PATH REPORT.RELEVANT HX SPEC: NORMAL

## 2023-05-11 PROCEDURE — 90792 PSYCH DIAG EVAL W/MED SRVCS: CPT | Mod: VID | Performed by: STUDENT IN AN ORGANIZED HEALTH CARE EDUCATION/TRAINING PROGRAM

## 2023-05-11 NOTE — PATIENT INSTRUCTIONS
- [facility] visit to be scheduled.    **For crisis resources, please see the information at the end of this document**   Patient Education    Thank you for coming to the [facility] [employer] [facility].     Lab Testing:  If you had lab testing today and your results are reassuring or normal they will be mailed to you or sent through [employer] within 7 days. If the lab tests need quick action we will call you with the results. The phone number we will call with results is # [phone]. If this is not the best number please call our clinic and change the number.     Medication Refills:  If you need any refills please call your pharmacy and they will contact us. Our fax number for refills is [phone].   Three business days of notice are needed for general medication refill requests.   Five business days of notice are needed for controlled substance refill requests.   If you need to change to a different pharmacy, please contact the new pharmacy directly. The new pharmacy will help you get your medications transferred.     Contact Us:  Please call [phone] during business hours (8-5:00 M-F).   If you have medication related questions after clinic hours, or on the weekend, please call [phone].     Financial Assistance [phone]   Medical Records [phone]       MENTAL HEALTH CRISIS RESOURCES:  For a emergency help, please call [phone] or go to the nearest Emergency Department.     Emergency Walk-In Options:   EmPATH Unit @ [facility] ([place]): [phone] - Specialized mental health emergency area designed to be calming  [facility] ([place]): [phone]  [facility] ([place]): [phone]  [facility]): [phone]    [facility] Crisis Information:   [place]: [phone]  [place]: [phone]  [place] (DARRYN) - Adult: [phone]     Child: [phone]  [place] - Adult: [phone]     Child:  [phone]  [place]: [phone]  List of all [facility] resources:   https://[place].gov/dhs/people-we-serve/adults/health-care/mental-health/resources/crisis-contacts.jsp    National Crisis Information:   Crisis Text Line: Text  [place]  to 524001  Suicide & Crisis Lifeline: 988  National Suicide Prevention Lifeline: 0-103-437-TALK (1-177.238.9967)       For online chat options, visit https://suicidepreventionlifeline.org/chat/  Poison Control Center: 0-350-882-5042  Trans Lifeline: 7-162-345-0183 - Hotline for transgender people of all ages  The [employer] Project: 7-225-797-4903 - Hotline for LGBT youth     For Non-Emergency Support:   Fast Tracker: Mental Health & Substance Use Disorder Resources -   https://www.[employer].org/

## 2023-05-11 NOTE — PROGRESS NOTES
Virtual Visit Details    Type of service:  Video Visit Roomer did not record times or patient location
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
## 2023-05-11 NOTE — NURSING NOTE
Is the patient currently in the state of [place]? YES    Visit mode:VIDEO    If the visit is dropped, the patient can be reconnected by: VIDEO VISIT: Text to cell phone: [phone]    Will anyone else be joining the visit? NO      How would you like to obtain your AVS? MyChart    Are changes needed to the allergy or medication list? NO    Reason for visit: Video Visit      Flavia GARCIA

## 2023-05-16 ENCOUNTER — VIRTUAL VISIT (OUTPATIENT)
Dept: PSYCHIATRY | Facility: CLINIC | Age: 23
End: 2023-05-16
Attending: SOCIAL WORKER
Payer: COMMERCIAL

## 2023-05-16 DIAGNOSIS — F29 PSYCHOSIS, UNSPECIFIED PSYCHOSIS TYPE (H): Primary | ICD-10-CM

## 2023-05-16 PROCEDURE — 90832 PSYTX W PT 30 MINUTES: CPT | Mod: HN

## 2023-05-16 PROCEDURE — 90834 PSYTX W PT 45 MINUTES: CPT | Mod: VID

## 2023-05-16 NOTE — PROGRESS NOTES
Medication Therapy Management (MTM) Encounter    ASSESSMENT:                            Medication Adherence/Access: No issues identified    Psychosis, Depression: Patient with reported low mood symptoms, no longer feels Lexapro is helping. Would like to switch to something else. Feels psychosis is well-controlled with olanzapine and is tolerating well. Patient has only 2 past antidepressant trials (Lexapro 1 year trial, 20mg and Prozac 6 month trial, 20mg dose escalation limited by side effects). Wellbutrin was previously discussed and would be a reasonable option - is not likely to cause sexual side effects.     Allergic rhinitis: Stable.    Supplements: Stable      PLAN:                            I think starting Wellbutrin 150mg daily would be a reasonable option. Please follow-up with Dr. Murphy about this at your visit tomorrow.     Follow-up: FEP findings visit 5/18/23; MTM 6 months or sooner as needed    SUBJECTIVE/OBJECTIVE:                          Irene Santiago is a 22 year old female called for an initial visit. She was referred to me from psychiatry.      Reason for visit: antidepressant medication.    Allergies/ADRs: Reviewed in chart  Past Medical History: Reviewed in chart  Tobacco: She reports that she has never smoked. She has never used smokeless tobacco.  Alcohol: none    Psychosis, Depression:   Current medications:   Lexapro 20mg daily  Olanzapine 10mg nightly    Amie Santiago is a 22 year old seen today for MTM as part of First Episode Psychosis (FEP)- Strengths Program interdisciplinary team assessment. Amie Santiago was seen 12/28/23 by JUAN DAVID Webber for diagnostic assessment and by psychiatry provider, Dr. Murphy on 5/11/23. Please see notes from these providers for additional details regarding symptoms, history and diagnostic impressions. In brief, patient has a psychiatric history of depression, ADHD, psychosis. Psychosis symptoms reported to include paranoia,  thought insertion, disorganized behavior. DA lists provisional diagnosis of substance-induced psychotic disorder, ADHD, MDD. Psychiatric medications previously managed by PCP Letty Echevarria.     Today, patient reports some initial improvement in depression with Lexapro initiation 1 year ago (April 2022), but felt it start to wear off about 6 months ago (Nov 2022). Endorses primary depression symptom of low mood. Denies concerns for daytime fatigue, trouble with focus/concentration today. Feels olanzapine is controlling psychosis symptoms well. Denies side effects. Dr. Murphy mentioned Wellbutrin as possible alternative to Lexapro.     Past medication trials:   Lexapro 20mg, April 2022 - present, efficacy wore off  Fluoxetine 20mg, prescribed Jan-May 2021, caused vivid dreams and emotional lability        5/2/2023     3:34 PM 5/9/2023     1:51 PM 5/11/2023    11:49 AM   PHQ   PHQ-9 Total Score 0 2 0   Q9: Thoughts of better off dead/self-harm past 2 weeks Not at all Not at all Not at all         2/27/2019     1:42 PM 4/28/2021     3:30 PM   DAMIÁN-7 SCORE   Total Score 1 0         Allergic Rhinitis: Current medications include loratadine 10mg once daily. Patient feels that current therapy is effective.     Supplmements: Daily multivitamin, ferrous sulfate 142mg daily    ----------------      I spent 15 minutes with this patient today. A copy of the visit note was provided to the patient's provider(s).    A summary of these recommendations was sent via [employer].    Lynnette Blankenship, PharmD, BCPP  Medication Therapy Management Pharmacist  [facility]      Telemedicine Visit Details  Type of service:  Telephone visit  Start Time: 3:30 pm  End Time: 3:45 pm     Medication Therapy Recommendations  Major depressive disorder with single episode, in full remission (H)    Current Medication: escitalopram (LEXAPRO) 20 MG tablet   Rationale: Condition refractory to medication - Ineffective  medication - Effectiveness   Recommendation: Change Medication - Wellbutrin  MG Tb24   Status: Contact Provider - Awaiting Response

## 2023-05-17 ENCOUNTER — VIRTUAL VISIT (OUTPATIENT)
Dept: PHARMACY | Facility: CLINIC | Age: 23
End: 2023-05-17
Payer: COMMERCIAL

## 2023-05-17 DIAGNOSIS — Z78.9 TAKES DIETARY SUPPLEMENTS: ICD-10-CM

## 2023-05-17 DIAGNOSIS — F29 PSYCHOSIS (H): Primary | ICD-10-CM

## 2023-05-17 DIAGNOSIS — J30.2 SEASONAL ALLERGIC RHINITIS: ICD-10-CM

## 2023-05-17 DIAGNOSIS — F33.9 MDD (MAJOR DEPRESSIVE DISORDER), RECURRENT EPISODE (H): ICD-10-CM

## 2023-05-17 PROCEDURE — 99605 MTMS BY PHARM NP 15 MIN: CPT | Performed by: PHARMACIST

## 2023-05-17 PROCEDURE — 99607 MTMS BY PHARM ADDL 15 MIN: CPT | Performed by: PHARMACIST

## 2023-05-17 NOTE — PATIENT INSTRUCTIONS
"Recommendations from today's MTM visit:                                                      I think starting Wellbutrin 150mg daily would be a reasonable option. Please follow-up with Dr. Murphy about this at your visit tomorrow.     Follow-up: FEP findings visit 5/18/23; MTM 6 months or sooner as needed    It was great speaking with you today.  I value your experience and would be very thankful for your time in providing feedback in our clinic survey. In the next few days, you may receive an email or text message from [employer] with a link to a survey related to your  clinical pharmacist.\"     To schedule another MTM appointment, please call the clinic directly or you may call the MTM scheduling line at [phone] or toll-free at [phone].     My Clinical Pharmacist's contact information:                                                      Please feel free to contact me with any questions or concerns you have.      Lynnette Blankenship, PharmD, BCPP  Medication Therapy Management Pharmacist  [facility]     "

## 2023-05-17 NOTE — Clinical Note
Yang Clark,   Thank you for the referral! I met with patient and think Wellbutrin is a reasonable option - see note for additional details, but patient is on board with starting it tomorrow at your findings visit if that is what you decide. Let me know if you have questions!  Thank you,   Lynnette Blankenship, PharmD, BCPP Medication Therapy Management Pharmacist [facility]

## 2023-05-17 NOTE — PROGRESS NOTES
[facility]  Psychiatry Clinic  First Episode of Psychosis - Strengths Program  Clinician Contact & Progress Note   For Family Education Program     Patient: Amie Santiago (2000)     MRN: [id number]  Diagnosis(es): Psychosis, unspecified psychosis type (H) [F29]  Clinician: Jose Ventura  Service Type: 57910 Family Therapy without patient  Prolonged Care for this visit is not indicated.  Clinical work consists of family therapy.     Date:  5/01/23    Video- Visit Details   Type of service:  video visit  Video start time: 4:02pm  Video end time: 5:00pm  Originating location (patient location):  [facility]   Location- Home  Distant Site (provider location): HIPAA compliant location Off-site  Platform used for video visit:  Secure real time interactive audio and visual telecommunication system via [employer]    People present:   Patient with family present  Mother - Amishre  Jose Ventura     Intervention:  Motivational Interviewing   Connect info and skills with personal goals  Promote hope and positive expectations  Explore pros and cons of change  Re-frame experiences in positive light    Educational Teaching Strategies   Review of written material/education  Relate information to client's experience  Ask questions to check comprehension  Break down information into small chunks  Adopt client's language     CBT   Reframe Cognitive Distortions (become aware of which distortions you are most vulnerable to, identifying and challenging our harmful automatic thoughts)  Play the Script Until the End (imagine the outcome of the worst case scenario, let the scenario play out, recognize that even if everything feared happens, it will likely turn out okay)  Behavioral Experiments (engage in a  what if  consideration, test existing beliefs  and/or help  test more adaptive beliefs, then modify unhelpful beliefs)  Pleasant Activity Scheduling (scheduling activities in the near future  that you can look forward to, introduced more positivity and reduce negative thinking)  Recognizing the positive (Visualize, write, or discuss the best parts of the day to promote positive thinking patterns)    Psychoeducational Topic(s) Addressed:  Betsey Story   Treatment Planning  Problem Solving  Crisis Intervention    Techniques utilized:   Bryant announced at beginning of session  Review of goal  Present new material  Problem-solving practice  Help client choose a home practice option  Summarize progress made in current session  Identified urgent concerns  Assessed caregiver/family burden  Elicit client/family feedback    Assessment & progress:     The focus of today's session was introductions, reviewing Rods Story and creating treatment plan. . Assessed symptom presence and potential triggers for the patient.  Identified Matildas symptoms since last visit as lack of energy, poor sleep, impaired thinking and anxiety. They reported current psychosocial stressors are work related. Family reported no urgent concerns at the time of session. No safety concerns reported or noted at the time of visit. Patient did not report any changes to medications.    The session started with introductions with Irene sharing her hopes for participating in Family Education Program (FEP). After introductions, the session continued with brief overview of [facility]'s team and FEP. This writer shared information in a conversational manner to promote discussion, share perspectives, and facilitate question/answer. Session continued with Rods Story and reflecting on Irene's experience of psychosis. Irene shared details of her experience including symptoms, delusions, hospitalization, substance use, and recovery. After Urbano's Story activity the session proceeded with treatment planning; the treatment plan is as follows.    Treatment Plan  1. Improve understanding of psychosis, treatment, symptoms, and recovery  2. Improve communication dynamics  to support recovery  3. Improve problem solving strategies to reduce family conflict/burden    With regard to family dynamics, overall family seems as if they are able to interact in a cooperative, pleasant and calm manner.  Helpful clinical techniques utilized during today's appointment appeared to be motivational interviewing, reflective listening, providing validation, and psychoeducation.  As of today's appt insight into Matildas mental illness appears adequate.   Family would benefit from continued clinical intervention aimed at assisting them to implement helpful strategies at home and increase their understanding of psychosis.    Plan/Referrals:   Irene and her family are participating in coordinated speciality care via the Strengths Program within our clinic. Family did express interest in continuing to meet for family therapy and psychoeducation.    Will meet with family bi-weekly for evidence based family psychoeducation and support aimed at maximizing Irene's opportunity for recovery from psychosis.      Crisis Numbers:   Provided routinely in AVS     After hours:  [phone]    Next session: 5/16/23 at 10AM    Treatment plan last completed on: 5/1/23  Next treatment plan update due by: 90 days  Treatment plan was initiated and completed at this visit.   Acknowledged consent of current treatment plan was signed.    Reviewed goals to increase problem solving techniques, communication patterns, and learn about the patient's psychotic experiences with their family.  We discussed relevant progress made, and ways family can help with these goals.      Please EPIC message with any questions or concerns.    PROVIDER: Jose Ventura [facility]    Patient staffed in supervision with Christelle Bell who will sign the note.  Supervisor is Christelle Bell.

## 2023-05-18 ENCOUNTER — VIRTUAL VISIT (OUTPATIENT)
Dept: PSYCHIATRY | Facility: CLINIC | Age: 23
End: 2023-05-18
Attending: PSYCHIATRY & NEUROLOGY
Payer: COMMERCIAL

## 2023-05-18 DIAGNOSIS — F33.9 EPISODE OF RECURRENT MAJOR DEPRESSIVE DISORDER, UNSPECIFIED DEPRESSION EPISODE SEVERITY (H): ICD-10-CM

## 2023-05-18 PROCEDURE — 99214 OFFICE O/P EST MOD 30 MIN: CPT | Mod: VID | Performed by: STUDENT IN AN ORGANIZED HEALTH CARE EDUCATION/TRAINING PROGRAM

## 2023-05-18 PROCEDURE — 90833 PSYTX W PT W E/M 30 MIN: CPT | Mod: VID | Performed by: STUDENT IN AN ORGANIZED HEALTH CARE EDUCATION/TRAINING PROGRAM

## 2023-05-18 RX ORDER — ESCITALOPRAM OXALATE 10 MG/1
TABLET ORAL
Qty: 25 TABLET | Refills: 0 | Status: SHIPPED | OUTPATIENT
Start: 2023-05-18 | End: 2023-08-07

## 2023-05-18 RX ORDER — BUPROPION HYDROCHLORIDE 150 MG/1
150 TABLET ORAL EVERY MORNING
Qty: 30 TABLET | Refills: 0 | Status: SHIPPED | OUTPATIENT
Start: 2023-05-18 | End: 2023-08-07

## 2023-05-18 NOTE — PATIENT INSTRUCTIONS
- START TAKING WELLBUTRIN 150 MG ONCE A DAY DAILY IN THE MORNING  - FOLLOW THE TAPER PROTOCOL OF LEXAPRO: 20 mg for 7 days, 10 mg for the next 7  days, 5 mg for the next 7 days then STOP.  - CONTINUE OLANZAPINE     **For crisis resources, please see the information at the end of this document**   Patient Education    Thank you for coming to the [facility] [place] [facility].     Lab Testing:  If you had lab testing today and your results are reassuring or normal they will be mailed to you or sent through [employer] within 7 days. If the lab tests need quick action we will call you with the results. The phone number we will call with results is # [phone]. If this is not the best number please call our clinic and change the number.     Medication Refills:  If you need any refills please call your pharmacy and they will contact us. Our fax number for refills is [phone].   Three business days of notice are needed for general medication refill requests.   Five business days of notice are needed for controlled substance refill requests.   If you need to change to a different pharmacy, please contact the new pharmacy directly. The new pharmacy will help you get your medications transferred.     Contact Us:  Please call [phone] during business hours (8-5:00 M-F).   If you have medication related questions after clinic hours, or on the weekend, please call [phone].     Financial Assistance [phone]   Medical Records [phone]       MENTAL HEALTH CRISIS RESOURCES:  For a emergency help, please call 911 or go to the nearest Emergency Department.     Emergency Walk-In Options:   EmPATH Unit @ [facility] ([place]): [phone] - Specialized mental health emergency area designed to be calming  [facility] ([place]): [phone]  [facility] ([place]): [phone]  [facility]):  [phone]    [facility] Crisis Information:   [place]: [phone]  [place]: [phone]  Radha (DARRYN) - Adult: [phone]     Child: [phone]  [place] - Adult: [phone]     Child: [phone]  [place]: [phone]  List of all [facility] resources:   https://[place].gov/dhs/people-we-serve/adults/health-care/mental-health/resources/crisis-contacts.jsp    National Crisis Information:   Crisis Text Line: Text  [place]  to 661051  Suicide & Crisis Lifeline: 988  National Suicide Prevention Lifeline: 3-839-430-TALK (1-532.752.2529)       For online chat options, visit https://suicidepreventionlifeline.org/chat/  Poison Control Center: 1-749.408.7483  Trans Lifeline: 1-257.775.2571 - Hotline for transgender people of all ages  The [employer] Project: 2-744-512-1902 - Hotline for LGBT youth     For Non-Emergency Support:   Fast Tracker: Mental Health & Substance Use Disorder Resources -   https://www.[employer].org/

## 2023-05-18 NOTE — NURSING NOTE
Is the patient currently in the state of [place]? YES    Visit mode:VIDEO    If the visit is dropped, the patient can be reconnected by: VIDEO VISIT: Text to cell phone: [phone]    Will anyone else be joining the visit? NO      How would you like to obtain your AVS? MyChart    Are changes needed to the allergy or medication list? NO    Reason for visit: Video Visit    RADHA Santiago on 5/18/2023 at 12:57 PM
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
## 2023-05-23 ENCOUNTER — VIRTUAL VISIT (OUTPATIENT)
Dept: PSYCHIATRY | Facility: CLINIC | Age: 23
End: 2023-05-23
Attending: SOCIAL WORKER
Payer: COMMERCIAL

## 2023-05-23 DIAGNOSIS — F29 PSYCHOSIS, UNSPECIFIED PSYCHOSIS TYPE (H): Primary | ICD-10-CM

## 2023-05-23 PROCEDURE — 90832 PSYTX W PT 30 MINUTES: CPT | Mod: VID

## 2023-05-23 NOTE — PROGRESS NOTES
[facility]  Psychiatry Clinic  First Episode of Psychosis - Strengths Program  Clinician Contact & Progress Note   For Individual Resiliency Training (IRT) & Psychotherapy     Patient: Amie Santiago (2000)     MRN: [id number]  Diagnosis(es): Unspecified Psychosis  Clinician: Reed Holley  Service Type: Psychotherapy 16-37 minutes    Date:  5/23/23    Video- Visit Details   Type of service:  video visit  Video start time: 3:30pm  Video end time: 4:04pm  Originating location (patient location):  [facility]   Location- Home  Distant Site (provider location): HIPAA compliant location Off-site  Platform used for video visit:  Secure real time interactive audio and visual telecommunication system via [employer]    People present:   Patient   Reed Holley   Nicci-Shadowing    Intervention:  Motivational Interviewing   Connect info and skills with personal goals  Promote hope and positive expectations  Re-frame experiences in positive light    Educational Teaching Strategies   Review of written material/education  Relate information to client's experience  Ask questions to check comprehension  Break down information into small chunks    CBT   Reinforcement and shaping (follow-through on home assignments)  Relapse prevention planning (review of stressors, early warning signs and written plan to respond to signs)  Reframe Cognitive Distortions (become aware of which distortions you are most vulnerable to, identifying and challenging our harmful automatic thoughts)  Play the Script Until the End (imagine the outcome of the worst case scenario, let the scenario play out, recognize that even if everything feared happens, it will likely turn out okay)  Behavioral Experiments (engage in a  what if  consideration, test existing beliefs  and/or help  test more adaptive beliefs, then modify unhelpful beliefs)  Pleasant Activity Scheduling (scheduling activities in the near future that you can  look forward to, introduced more positivity and reduce negative thinking)  Recognizing the positive (Visualize, write, or discuss the best parts of the day to promote positive thinking patterns)      IRT Module(s) or topics addressed:  Module 4: Relapse Prevention Planning including learning about early warning signs and triggers of relapse and developing a specific relapse prevention plan for Amie with the goal of continued recovery and sustained well-being.       Did the client complete the home practice option(s) from the previous session:   Completed    Techniques utilized:   Spencer announced at beginning of session  Review of previous meeting  Present new material  Identified urgent concerns  Assessed caregiver/family burden  Review of strengths, barriers, objectives, and interventions  Illicit client/family feedback    Mental Status Exam:  Alertness: alert   Appearance: adequately groomed  Behavior/Demeanor: cooperative, with good eye contact   Speech: normal  Language: good  Psychomotor: normal or unremarkable  Mood: description consistent with euthymia  Thought Process/Associations:  logical unremarkable  Thought Content:  no evidence of suicidal ideation or homicidal ideation, no evidence of psychotic thought, no auditory hallucinations present and obsessions present  Perception:  Reports none;  Denies none  Insight: good  Judgment: good  Cognition: does  appear grossly intact; formal cognitive testing was not done;       Assessment/Progress Note:   I met with Amie for an IRT session.  The focus of today's session was understanding stressors that trigger psychotic episode and revisiting relapse preventionplan.  This writer utilized therapeutic techniques of Motivational Interviewing and Insight-oriented. Assessed symptom presence and potential triggers for the patient.  Identified Irene's symptoms since last visit as none.  Amie shared that their current psychosocial stressors to being her relationship  breakup. Irene shared that she is feeling sad about her breakup but she recognizes that it is better for her.  Discussed recently utilized coping strategies and techniques to include talking to her support system her mom and going to the gym. Explored additional strategies Amie can try to effectively cope with stress and manage symptoms including listening to music. Amie was open to trying newly discussed coping strategies. They did not identify urgent concerns needing to be addressed in today.    Patient did not report any changes to medications     Module 4: Relapse Prevention Planning including learning about early warning signs and triggers of relapse and developing a specific relapse prevention plan for Amie with the goal of continued recovery and sustained well-being.     Overall Amie was cooperative throughout the session.  This writer observed Irene was in a happy mood. Helpful clinical techniques utilized during today's appointment appeared to be reflective listening and. Irene shared that she spent the weekend of her mother as she was upset and not in a good mood over relationship breakup. She also shared that her communication with her mother is getting better and she feels that her mother now understands her better. Irene shared that she will share her relapse prevention plan with her plan.  As of today's appt insight to their mental illness appears good.  Symptom assessment, safety assessment, discussion and identification of coping strategies, and exploration of material in IRT modules was all in support of Amie's self-identified goal(s) of understanding triggers and developing coping skills, as identified in most recent [facility] Treatment Plan. Progress toward goal completion seems good.    With regards to safety, Amie reported the following:    Suicidal ideation: none. Plan: none. Urges: none. Intent: none.     Self-harm: Thoughts - none. Urges - none. Intent - none.    Homicidal or violent  ideation: none. Specific individual(s): none. Plan: none. Urges: none. Intent: none.    [place] Protocol Risk Identification:  1) Have you wished you were dead or wished you could go to sleep and not wake up? No  2) Have you actually had any thoughts about killing yourself? No  If YES to 2, answer questions 3, 4, 5, 6  If NO to 2, go directly to question 6  3) Have you thought about how you might do this? N/A  4) Have you had any intension of acting on these thoughts of killing yourself, as opposed to you have the thoughts but you definitely would not act on them? N/A  5) Have you started to work out or worked out the details of how to kill yourself? Do you intend to carry out this plan? N/A  Always Ask Question 6  6) Have you done anything, started to do anything, or prepared to do anything to end your life? No  Examples: collected pills, obtained a gun, gave away valuables, wrote a will or suicide note, held a gun but changed your mind, cut yourself, tried to hang yourself, etc.    Discussed overall safety plan should symptoms feel unmanageable or safety concerns become imminent to include: call crisis line, call emergency or call family/support person. Amie was able to contract for safety.   Crisis Numbers:   Provided routinely in AVS     After hours:  [phone]    Plan/Referrals:   Home practice was identified as Share relapse prevention plan with family member- Mother .    Irene is participating in coordinated speciality care via the Strengths Program within our clinic.  Will meet with Amie weekly  for Individual Resiliency training, therapy, and support aimed at maximizing Amie's opportunity for recovery from psychosis.    Next session: 5/30/23    Treatment plan last completed on: 3/21/2023  Next treatment plan update due by: 06/21/2023  Treatment plan was reviewed with patient at this visit. (Detail why not if incomplete or not reviewed).   Acknowledged consent of current treatment plan was not  signed (d/t patient not being present in this session. Will review and sign at the next session the patient is present with us).     Please EPIC message with any questions or concerns.  PROVIDER: Reed Holley    Patient reviewed in supervision with Rene Miller who will sign the note.         Additional screening measures (Complete every 90 days)   DAMIÁN-7 and PHQ-9:      5/18/2023    12:45 PM   Last PHQ-9   1.  Little interest or pleasure in doing things 0   2.  Feeling down, depressed, or hopeless 0   3.  Trouble falling or staying asleep, or sleeping too much 0   4.  Feeling tired or having little energy 0   5.  Poor appetite or overeating 0   6.  Feeling bad about yourself 0   7.  Trouble concentrating 0   8.  Moving slowly or restless 0   Q9: Thoughts of better off dead/self-harm past 2 weeks 0   PHQ-9 Total Score 0         4/28/2021     3:30 PM   DAMIÁN-7    1. Feeling nervous, anxious, or on edge 0   2. Not being able to stop or control worrying 0   3. Worrying too much about different things 0   4. Trouble relaxing 0   5. Being so restless that it is hard to sit still 0   6. Becoming easily annoyed or irritable 0   7. Feeling afraid, as if something awful might happen 0   DAMIÁN-7 Total Score 0   If you checked any problems, how difficult have they made it for you to do your work, take care of things at home, or get along with other people? Not difficult at all

## 2023-05-30 ENCOUNTER — VIRTUAL VISIT (OUTPATIENT)
Dept: PSYCHIATRY | Facility: CLINIC | Age: 23
End: 2023-05-30
Attending: SOCIAL WORKER
Payer: COMMERCIAL

## 2023-05-30 DIAGNOSIS — F29 PSYCHOSIS, UNSPECIFIED PSYCHOSIS TYPE (H): Primary | ICD-10-CM

## 2023-05-30 PROCEDURE — 90832 PSYTX W PT 30 MINUTES: CPT | Mod: VID

## 2023-05-30 PROCEDURE — 90846 FAMILY PSYTX W/O PT 50 MIN: CPT | Mod: VID

## 2023-06-06 NOTE — PROGRESS NOTES
[facility]  Psychiatry Clinic  First Episode of Psychosis - Strengths Program  Clinician Contact & Progress Note   For Individual Resiliency Training (IRT) & Psychotherapy     Patient: Amie Santiago (2000)     MRN: [id number]  Diagnosis(es): Unspecified Psychosis  Clinician: Reed Holley  Service Type: Psychotherapy 16-37 minutes    Date:  5/09/23    Video- Visit Details   Type of service:  video visit  Video start time: 3:33pm  Video end time: 4:09pm  Originating location (patient location):  [facility]   Location- [place]  Distant Site (provider location): HIPAA compliant location Off-site  Platform used for video visit:  Secure real time interactive audio and visual telecommunication system via [employer]    People present:   Patient   Reed Holley     Intervention:  Motivational Interviewing   Connect info and skills with personal goals  Promote hope and positive expectations  Explore pros and cons of change    Educational Teaching Strategies   Review of written material/education  Relate information to client's experience  Ask questions to check comprehension  Break down information into small chunks  Adopt client's language     CBT   Relapse prevention planning (rehearse plan)  Coping skills training (review current coping skills)  Reframe Cognitive Distortions (become aware of which distortions you are most vulnerable to, identifying and challenging our harmful automatic thoughts)  Pleasant Activity Scheduling (scheduling activities in the near future that you can look forward to, introduced more positivity and reduce negative thinking)  Recognizing the positive (Visualize, write, or discuss the best parts of the day to promote positive thinking patterns)      IRT Module(s) or topics addressed:  Module 3 - Education about Psychosis    Did the client complete the home practice option(s) from the previous session:   Not Applicable    Techniques utilized:   [place] announced at  beginning of session  Review of goal  Review of previous meeting  Present new material  Identified urgent concerns  Assessed caregiver/family burden  Review of strengths, barriers, objectives, and interventions  Illicit client/family feedback    Mental Status Exam:  Alertness: alert   Appearance: adequately groomed  Behavior/Demeanor: pleasant, with good eye contact   Speech: normal  Language: good  Psychomotor: normal or unremarkable  Mood: description consistent with euthymia  Thought Process/Associations:  logical unremarkable  Thought Content:  no evidence of suicidal ideation or homicidal ideation, no evidence of psychotic thought, no auditory hallucinations present and no visual hallucinations present  Perception:  Reports none;  Denies none  Insight: good  Judgment: good  Cognition: does  appear grossly intact; formal cognitive testing was not done;       Assessment/Progress Note:   I met with Amie for an IRT session.  The focus of today's session was promoting return to functioning in emotional regulation, thought process and social relationships.  This writer utilized therapeutic techniques of Cognitive-Behavioral, Supportive and Motivational Interviewing. Assessed symptom presence and potential triggers for the patient.  Identified Irene's symptoms since last visit as none.  Amie did not  Share any current psychosocial stressors but mentioned that she is looking for a second job. She shared that she likes current job but would prefer more working hours and that she will try to speak to her manager.  Discussed recently utilized coping strategies and techniques to include drawing, listening to music and working out. Explored additional strategies Amie can try to effectively cope with stress and manage symptoms including taking a walk outside since the weather Is warmer. Amie was open to trying newly discussed coping strategies. They did not identify urgent concerns needing to be addressed in today.       Patient did not report any changes to medications     Module 3: Education about Psychosis including discussing symptoms and common characteristics of psychosis, reviewing medications commonly prescribed for psychosis, discussing coping with stress and identifying coping strategies as well as exploring strategies to continue building resilience within Amie.      Overall Amie was cooperative, attentive and engaged throughout the session.  This writer observed attentive throughout the session and appeared to be in good.  Helpful clinical techniques utilized during today's appointment appeared to be reflective listening and breaking down information.  As of today's appt insight to their mental illness appears good.  Symptom assessment, safety assessment, discussion and identification of coping strategies, and exploration of material in IRT modules was all in support of Amie's self-identified goal(s) of obtaining and maintaining employment, as identified in most recent BEH Treatment Plan. Progress toward goal completion seems good.    With regards to safety, Amie reported the following:    Suicidal ideation:     . Plan: none. Urges: none. Intent: none.     Self-harm: Thoughts - none. Urges - none. Intent - none.    Homicidal or violent ideation: none. Specific individual(s): none. Plan: none. Urges: none. Intent: none.    [place] Protocol Risk Identification:  1) Have you wished you were dead or wished you could go to sleep and not wake up? No  2) Have you actually had any thoughts about killing yourself? No  If YES to 2, answer questions 3, 4, 5, 6  If NO to 2, go directly to question 6  3) Have you thought about how you might do this? N/A  4) Have you had any intension of acting on these thoughts of killing yourself, as opposed to you have the thoughts but you definitely would not act on them? N/A  5) Have you started to work out or worked out the details of how to kill yourself? Do you intend to carry out  this plan? N/A  Always Ask Question 6  6) Have you done anything, started to do anything, or prepared to do anything to end your life? No  Examples: collected pills, obtained a gun, gave away valuables, wrote a will or suicide note, held a gun but changed your mind, cut yourself, tried to hang yourself, etc.    Discussed overall safety plan should symptoms feel unmanageable or safety concerns become imminent to include: Calling Family or  call emergency/crisis line. Amie was able to contract for safety.   Crisis Numbers:   Provided routinely in AVS     After hours:  [phone]    Plan/Referrals:   Home practice was identified as NA.    Irene is participating in coordinated speciality care via the Strengths Program within our clinic.  Will meet with Amie weekly  for Individual Resiliency training, therapy, and support aimed at maximizing Amie's opportunity for recovery from psychosis.    Next session: 5/16/2023    Treatment plan last completed on: 3/21/23  Next treatment plan update due by: 6/21/2023  Treatment plan was reviewed with patient at this visit. (Detail why not if incomplete or not reviewed).   Acknowledged consent of current treatment plan was not signed (d/t patient not being present in this session. Will review and sign at the next session the patient is present with us).     Please EPIC message with any questions or concerns.  PROVIDER: Reed Holley    Patient reviewed in supervision with Rene Miller who will sign the note.         Additional screening measures (Complete every 90 days)   DAMIÁN-7 and PHQ-9:      5/18/2023    12:45 PM   Last PHQ-9   1.  Little interest or pleasure in doing things 0   2.  Feeling down, depressed, or hopeless 0   3.  Trouble falling or staying asleep, or sleeping too much 0   4.  Feeling tired or having little energy 0   5.  Poor appetite or overeating 0   6.  Feeling bad about yourself 0   7.  Trouble concentrating 0   8.  Moving slowly or restless 0   Q9:  Thoughts of better off dead/self-harm past 2 weeks 0   PHQ-9 Total Score 0         4/28/2021     3:30 PM   DAMIÁN-7    1. Feeling nervous, anxious, or on edge 0   2. Not being able to stop or control worrying 0   3. Worrying too much about different things 0   4. Trouble relaxing 0   5. Being so restless that it is hard to sit still 0   6. Becoming easily annoyed or irritable 0   7. Feeling afraid, as if something awful might happen 0   DAMIÁN-7 Total Score 0   If you checked any problems, how difficult have they made it for you to do your work, take care of things at home, or get along with other people? Not difficult at all

## 2023-06-06 NOTE — PROGRESS NOTES
[facility]  Psychiatry Clinic  First Episode of Psychosis - Strengths Program  Clinician Contact & Progress Note   For Individual Resiliency Training (IRT) & Psychotherapy     Patient: Amie Santiago (2000)     MRN: [id number]  Diagnosis(es): Unspecified Psychosis  Clinician: Reed Holley  Service Type: Psychotherapy 16-37 minutes    Date:  5/30/23    Video- Visit Details   Type of service:  video visit  Video start time: 3:32pm  Video end time: 4:07pm  Originating location (patient location):  [facility]   Location- [place]  Distant Site (provider location): HIPAA compliant location On-site  Platform used for video visit:  Secure real time interactive audio and visual telecommunication system via [employer]    People present:   Patient   Reed Holley     Intervention:  Motivational Interviewing   Connect info and skills with personal goals  Promote hope and positive expectations  Explore pros and cons of change  Re-frame experiences in positive light    Educational Teaching Strategies   Review of written material/education  Relate information to client's experience  Ask questions to check comprehension  Break down information into small chunks  Adopt client's language     CBT   Cognitive restructuring (identify thoughts related to negative feelings)  Reframe Cognitive Distortions (become aware of which distortions you are most vulnerable to, identifying and challenging our harmful automatic thoughts)  Play the Script Until the End (imagine the outcome of the worst case scenario, let the scenario play out, recognize that even if everything feared happens, it will likely turn out okay)  Pleasant Activity Scheduling (scheduling activities in the near future that you can look forward to, introduced more positivity and reduce negative thinking)  Recognizing the positive (Visualize, write, or discuss the best parts of the day to promote positive thinking patterns)      IRT Module(s) or  topics addressed:  Module 5 - Processing the Psychotic Episode    Did the client complete the home practice option(s) from the previous session:   Completed    Techniques utilized:   [place] announced at beginning of session  Review of goal  Review of previous meeting  Present new material  Problem-solving practice  Identified urgent concerns  Assessed caregiver/family burden  Review of strengths, barriers, objectives, and interventions  Illicit client/family feedback    Mental Status Exam:  Alertness: alert   Appearance: well groomed  Behavior/Demeanor: pleasant, with good eye contact   Speech: normal  Language: intact and good  Psychomotor: normal or unremarkable  Mood: description consistent with euthymia  Thought Process/Associations:  logical unremarkable  Thought Content:  no evidence of suicidal ideation or homicidal ideation, no evidence of psychotic thought, no auditory hallucinations present and no visual hallucinations present  Perception:  Reports none;  Denies none  Insight: good  Judgment: good  Cognition: does  appear grossly intact; formal cognitive testing was not done;       Assessment/Progress Note:   I met with Amie for an IRT session.  The focus of today's session was promoting return to functioning in emotional regulation, thought process and social relationships.  This writer utilized therapeutic techniques of Cognitive-Behavioral, Supportive, Motivational Interviewing and Insight-oriented. Irene shared what she experienced during her episode with this writer and stated that she remembers feeling intense feeling of fear, and paranoid. Assessed symptom presence and potential triggers for the patient.  Identified Irene's symptoms since last visit as none.  Amie shared that there is no current psychosocial stressors. This  Discussed recently utilized coping strategies and techniques to include continuing to utilize currently strategies; working out, listening to music and drawing. Explored  additional strategies Amie can try to effectively cope with stress and manage symptoms including talking a walk and drawing. Amie was open to trying newly discussed coping strategies. They did not identify urgent concerns needing to be addressed in today.      Patient did not report any changes to medications     Module 5: Processing the Psychotic Episode including discussing and coming to an understanding of what happened from Amie's perspective and learning strategies for addressing the negative, self-stigmatizing thoughts that can develop as a result of experiencing an episode of psychosis.       Overall Amie was cooperative and attentive throughout the session.  This writer observed Irene was engaged, open and vulnerable in the session.  Helpful clinical techniques utilized during today's appointment appeared to be validation, empathetic and reflective listening.  She shared that being away and in cabin with her ex while using substances didn't help with her feeling of paranoid. Irene shared that she felt more safe and aware of her surroundings on her second stay at the hospital compared to her first episode.As of today's appt insight to their mental illness appears good.  Symptom assessment, safety assessment, discussion and identification of coping strategies, and exploration of material in IRT modules was all in support of Amie's self-identified goal(s) , as identified in most recent BEH Treatment Plan. Progress toward goal completion seems good.    With regards to safety, Amie reported the following:    Suicidal ideation: none. Plan: none. Urges: none. Intent: none.     Self-harm: Thoughts - none. Urges - none. Intent - none.    Homicidal or violent ideation: none. Specific individual(s): none. Plan: none. Urges: none. Intent: none.    [place] Protocol Risk Identification:  1) Have you wished you were dead or wished you could go to sleep and not wake up? No  2) Have you actually had any thoughts  about killing yourself? No  If YES to 2, answer questions 3, 4, 5, 6  If NO to 2, go directly to question 6  3) Have you thought about how you might do this? N/A  4) Have you had any intension of acting on these thoughts of killing yourself, as opposed to you have the thoughts but you definitely would not act on them? N/A  5) Have you started to work out or worked out the details of how to kill yourself? Do you intend to carry out this plan? N/A  Always Ask Question 6  6) Have you done anything, started to do anything, or prepared to do anything to end your life? No  Examples: collected pills, obtained a gun, gave away valuables, wrote a will or suicide note, held a gun but changed your mind, cut yourself, tried to hang yourself, etc.    Discussed overall safety plan should symptoms feel unmanageable or safety concerns become imminent to include: calling family/support or calling emergency/crisis line. Amie was able to contract for safety.   Crisis Numbers:   Provided routinely in AVS     After hours:  [phone]    Plan/Referrals:   Home practice was identified as NA.    Irene is participating in coordinated speciality care via the Strengths Program within our clinic.  Will meet with Amie weekly  for Individual Resiliency training, therapy, and support aimed at maximizing Amie's opportunity for recovery from psychosis.    Next session: 6/6/2023    Treatment plan last completed on: 3/21/23  Next treatment plan update due by: 6/21/23  Treatment plan was reviewed with patient at this visit. (Detail why not if incomplete or not reviewed).   Acknowledged consent of current treatment plan was not signed (d/t patient not being present in this session. Will review and sign at the next session the patient is present with us).     Please EPIC message with any questions or concerns.  PROVIDER: Reed Holley    Patient reviewed in supervision with Rene brennan who will sign the note.         Additional screening  measures (Complete every 90 days)   DAMIÁN-7 and PHQ-9:        5/18/2023    12:45 PM   Last PHQ-9   1.  Little interest or pleasure in doing things 0   2.  Feeling down, depressed, or hopeless 0   3.  Trouble falling or staying asleep, or sleeping too much 0   4.  Feeling tired or having little energy 0   5.  Poor appetite or overeating 0   6.  Feeling bad about yourself 0   7.  Trouble concentrating 0   8.  Moving slowly or restless 0   Q9: Thoughts of better off dead/self-harm past 2 weeks 0   PHQ-9 Total Score 0         4/28/2021     3:30 PM   DAMIÁN-7    1. Feeling nervous, anxious, or on edge 0   2. Not being able to stop or control worrying 0   3. Worrying too much about different things 0   4. Trouble relaxing 0   5. Being so restless that it is hard to sit still 0   6. Becoming easily annoyed or irritable 0   7. Feeling afraid, as if something awful might happen 0   DAMIÁN-7 Total Score 0   If you checked any problems, how difficult have they made it for you to do your work, take care of things at home, or get along with other people? Not difficult at all

## 2023-06-06 NOTE — PROGRESS NOTES
[facility]  Psychiatry Clinic  First Episode of Psychosis - Strengths Program  Clinician Contact & Progress Note   For Individual Resiliency Training (IRT) & Psychotherapy     Patient: Amie Santiago (2000)     MRN: [id number]  Diagnosis(es): Unspecified Psychosis  Clinician: Reed Holley  Service Type: Psychotherapy 16-37 minutes    Date:  5/16/23    Video- Visit Details   Type of service:  video visit  Video start time: 3:30pm  Video end time: 4:00pm  Originating location (patient location):  [facility]   Location- [place]  Distant Site (provider location): HIPAA compliant location Off-site  Platform used for video visit:  Secure real time interactive audio and visual telecommunication system via [employer]    People present:   Patient   Reed Holley     Intervention:  Motivational Interviewing   Connect info and skills with personal goals  Promote hope and positive expectations  Explore pros and cons of change    Educational Teaching Strategies   Review of written material/education  Relate information to client's experience  Ask questions to check comprehension  Break down information into small chunks  Adopt client's language     CBT   Cognitive restructuring (identify thoughts related to negative feelings)  Reframe Cognitive Distortions (become aware of which distortions you are most vulnerable to, identifying and challenging our harmful automatic thoughts)  Pleasant Activity Scheduling (scheduling activities in the near future that you can look forward to, introduced more positivity and reduce negative thinking)  Recognizing the positive (Visualize, write, or discuss the best parts of the day to promote positive thinking patterns)    IRT Module(s) or topics addressed:  Module 4 - Relapse Prevention Planning    Did the client complete the home practice option(s) from the previous session:   Not Applicable    Techniques utilized:   [place] announced at beginning of  session  Review of previous meeting  Present new material  Help client choose a home practice option  Identified urgent concerns  Assessed caregiver/family burden  Review of strengths, barriers, objectives, and interventions  Illicit client/family feedback    Mental Status Exam:  Alertness: alert   Appearance: adequately groomed  Behavior/Demeanor: cooperative, with good eye contact   Speech: normal  Language: good  Psychomotor: normal or unremarkable  Mood: description consistent with euthymia  Thought Process/Associations:  logical unremarkable  Thought Content:  no evidence of suicidal ideation or homicidal ideation, no evidence of psychotic thought, no auditory hallucinations present and no visual hallucinations present  Perception:  Reports none;  Denies none  Insight: good  Judgment: good  Cognition: does  appear grossly intact; formal cognitive testing was not done;       Assessment/Progress Note:   I met with Amie for an IRT session.  The focus of today's session was promoting return to functioning in emotional regulation, thought process and social relationships.  This writer utilized therapeutic techniques of Supportive, Motivational Interviewing and Insight-oriented. Assessed symptom presence and potential triggers for the patient.  Amie shared that she has an upcoming appointment with her psychiatrist and she will mention her medication concern to her then. The writer role played with Irene to make sure she is able to advocate for herself and that she is addressing her concern. Identified Irene's symptoms since last visit as none.  Amie shared that their current psychosocial stressors are none.  Discussed recently utilized coping strategies and techniques to include drawing and listening to music. Explored additional strategies Amie can try to effectively cope with stress and manage symptoms including talking a walk and sleeping it off. Amie was open to trying newly discussed coping strategies.  They did not identify urgent concerns needing to be addressed in today.      Patient did not report any changes to medications     Module 3: Education about Psychosis including discussing symptoms and common characteristics of psychosis, reviewing medications commonly prescribed for psychosis, discussing coping with stress and identifying coping strategies as well as exploring strategies to continue building resilience within Amie.      Overall Amie was cooperative and engaged throughout the session.  This writer observed Irene appeared to be tired. She shared that she had a long day and was tired.  Helpful clinical techniques utilized during today's appointment appeared to be validation, and psychoeducation. This writer continued to provide psycho-education about the potential side effects of certain medications and strategies for taking medication regularly.  As of today's appt insight to their mental illness appears good.  Symptom assessment, safety assessment, discussion and identification of coping strategies, and exploration of material in IRT modules was all in support of Amie's self-identified goals, as identified in most recent BE[facility] Treatment Plan. Progress toward goal completion seems good.    With regards to safety, Amie reported the following:    Suicidal ideation: none. Plan: noen. Urges: none. Intent: none.     Self-harm: Thoughts - none. Urges - none. Intent - none.    Homicidal or violent ideation: none. Specific individual(s): none. Plan: none. Urges: none. Intent: none.    [place] Protocol Risk Identification:  1) Have you wished you were dead or wished you could go to sleep and not wake up? No  2) Have you actually had any thoughts about killing yourself? No  If YES to 2, answer questions 3, 4, 5, 6  If NO to 2, go directly to question 6  3) Have you thought about how you might do this? N/A  4) Have you had any intension of acting on these thoughts of killing yourself, as opposed to you  have the thoughts but you definitely would not act on them? N/A  5) Have you started to work out or worked out the details of how to kill yourself? Do you intend to carry out this plan? N/A  Always Ask Question 6  6) Have you done anything, started to do anything, or prepared to do anything to end your life? No  Examples: collected pills, obtained a gun, gave away valuables, wrote a will or suicide note, held a gun but changed your mind, cut yourself, tried to hang yourself, etc.    Discussed overall safety plan should symptoms feel unmanageable or safety concerns become imminent to include: Calling family, or call emergency/crisis line. Amie was able to contract for safety.   Crisis Numbers:   Provided routinely in AVS     After hours:  [phone]    Plan/Referrals:   Home practice was identified as GURWINDER Bonilla is participating in coordinated speciality care via the Strengths Program within our clinic.  Will meet with Amie weekly  for Individual Resiliency training, therapy, and support aimed at maximizing Amie's opportunity for recovery from psychosis.    Next session: 5/23/23.    Treatment plan last completed on: 3/21/23  Next treatment plan update due by: 6/21/23  Treatment plan was reviewed with patient at this visit. (Detail why not if incomplete or not reviewed).   Acknowledged consent of current treatment plan was not signed (d/t patient not being present in this session. Will review and sign at the next session the patient is present with us).     Please EPIC message with any questions or concerns.  PROVIDER: Reed Holley    Patient reviewed in supervision with Rene Miller who will sign the note.         Additional screening measures (Complete every 90 days)   DAMIÁN-7 and PHQ-9:      5/18/2023    12:45 PM   Last PHQ-9   1.  Little interest or pleasure in doing things 0   2.  Feeling down, depressed, or hopeless 0   3.  Trouble falling or staying asleep, or sleeping too much 0   4.  Feeling tired or  having little energy 0   5.  Poor appetite or overeating 0   6.  Feeling bad about yourself 0   7.  Trouble concentrating 0   8.  Moving slowly or restless 0   Q9: Thoughts of better off dead/self-harm past 2 weeks 0   PHQ-9 Total Score 0         4/28/2021     3:30 PM   DAMIÁN-7    1. Feeling nervous, anxious, or on edge 0   2. Not being able to stop or control worrying 0   3. Worrying too much about different things 0   4. Trouble relaxing 0   5. Being so restless that it is hard to sit still 0   6. Becoming easily annoyed or irritable 0   7. Feeling afraid, as if something awful might happen 0   DAMIÁN-7 Total Score 0   If you checked any problems, how difficult have they made it for you to do your work, take care of things at home, or get along with other people? Not difficult at all

## 2023-06-07 ENCOUNTER — CARE COORDINATION (OUTPATIENT)
Dept: PSYCHIATRY | Facility: CLINIC | Age: 23
End: 2023-06-07

## 2023-06-07 NOTE — PROGRESS NOTES
NAVIGATE/STRENGTHS SEE Outgoing Telephone Call  For Supported Employment & Education    NAVIGATE Enrollee: Amie Santiago (2000)     MRN: [id number]  Date of Call: 6/7/2023  Contacted: Irene  Call Length: 5    Discussed:   Writer contacted Irene to follow-up with need for providing school accommodations. Writer sent email and text message.     Alexei BARBOSA      Physical Exam

## 2023-06-12 ENCOUNTER — TELEPHONE (OUTPATIENT)
Dept: PSYCHIATRY | Facility: CLINIC | Age: 23
End: 2023-06-12
Payer: COMMERCIAL

## 2023-06-12 NOTE — PROGRESS NOTES
[facility]  Psychiatry Clinic  First Episode of Psychosis - Strengths Program  Clinician Contact & Progress Note   For Family Education Program     Patient: Amie Santiago (2000)     MRN: [id number]  Diagnosis(es): Psychosis, unspecified psychosis type (H) [F29]  Clinician: Jose Ventura  Service Type: 46003 Family Therapy without patient  Prolonged Care for this visit is not indicated.  Clinical work consists of family therapy.     Date:  5/16/23    Video- Visit Details   Type of service:  video visit  Video start time: 10:02am   Video end time: 10:45am  Originating location (patient location):  [facility]   Location- Home  Distant Site (provider location): HIPAA compliant location Off-site  Platform used for video visit:  Secure real time interactive audio and visual telecommunication system via [employer]    People present:   Patient   Jose Ventura     Intervention:  Motivational Interviewing   Connect info and skills with personal goals  Promote hope and positive expectations  Explore pros and cons of change  Re-frame experiences in positive light    Educational Teaching Strategies   Review of written material/education  Relate information to client's experience  Ask questions to check comprehension  Break down information into small chunks  Adopt client's language     CBT   Reframe Cognitive Distortions (become aware of which distortions you are most vulnerable to, identifying and challenging our harmful automatic thoughts)  Play the Script Until the End (imagine the outcome of the worst case scenario, let the scenario play out, recognize that even if everything feared happens, it will likely turn out okay)  Behavioral Experiments (engage in a  what if  consideration, test existing beliefs  and/or help  test more adaptive beliefs, then modify unhelpful beliefs)  Pleasant Activity Scheduling (scheduling activities in the near future that you can look forward to,  introduced more positivity and reduce negative thinking)  Recognizing the positive (Visualize, write, or discuss the best parts of the day to promote positive thinking patterns)    Psychoeducational Topic(s) Addressed:  Family Member Interview - Client Interview   Treatment Planning  Problem Solving  Crisis Intervention    Techniques utilized:   [place] announced at beginning of session  Review of goal  Present new material  Problem-solving practice  Help client choose a home practice option  Summarize progress made in current session  Identified urgent concerns  Assessed caregiver/family burden  Elicit client/family feedback    Assessment & progress:     The focus of today's session was starting family member interviews - the client interview with Irene . Assessed symptom presence and potential triggers for the patient.  Identified Irene's symptoms since last visit as lack of energy, poor sleep, impaired thinking and anxiety. They reported current psychosocial stressors are work related. Family reported no urgent concerns at the time of session. No safety concerns reported or noted at the time of visit. Patient did not report any changes to medications.    The session started with check in with Irene reported no current issues or concerns. After check in, the session proceed with the family member interviews. This writer and Irene talked through family interview. Irene shared information about family history, narrative of illness, and provided overview of current understanding of her diagnosis, etc. This writer will update the treatment team and use information to guide educational discussions in coming visits.    With regard to family dynamics, overall family seems as if they are able to interact in a cooperative, pleasant and calm manner.  Helpful clinical techniques utilized during today's appointment appeared to be motivational interviewing, reflective listening, providing validation, and psychoeducation.  As of  today's appt insight into Irene's mental illness appears adequate.   Family would benefit from continued clinical intervention aimed at assisting them to implement helpful strategies at home and increase their understanding of psychosis.    Plan/Referrals:   Irene and her family are participating in coordinated speciality care via the Strengths Program within our clinic. Family did express interest in continuing to meet for family therapy and psychoeducation.    Will meet with family bi-weekly for evidence based family psychoeducation and support aimed at maximizing Irene's opportunity for recovery from psychosis.      Crisis Numbers:   Provided routinely in AVS     After hours:  [phone]    Next session: 5/30/23 at 10AM    Treatment plan last completed on: 5/1/23  Next treatment plan update due by: 90 days  Treatment plan was initiated and completed at this visit.   Acknowledged consent of current treatment plan was signed.    Reviewed goals to increase problem solving techniques, communication patterns, and learn about the patient's psychotic experiences with their family.  We discussed relevant progress made, and ways family can help with these goals.      Please EPIC message with any questions or concerns.    PROVIDER: JUAN DAVID Leal    Patient staffed in supervision with Christelle Bell who will sign the note.  Supervisor is Christelle Bell.

## 2023-06-15 NOTE — TELEPHONE ENCOUNTER
[facility] Psychiatry Clinic  First Episode of Psychosis - Strengths Program  Follow-up Contact    Patient Name: Amie Santiago  /Age:  2000 (22 year old)  Clinician: Reed Holley     Correspondence with: Patient  Phone call (not reached/unavailable)      Reason for Follow-Up:   Contacted by Patient Mother that Irene was [place]. Contacted Irene to get verbal release of information to speak to the rehab center for care coordination and updates.     Assessment/Progress note:  Provided case management and care coordination for Irene with regard to getting release of information.   Plan/Referrals:    Left message      Provided with writer's contact information and availability.   Will route to patient's current psychiatric provider(s) as an FYI.     Please call or EPIC message with any questions or concerns.

## 2023-06-21 NOTE — PROGRESS NOTES
[facility]  Psychiatry Clinic  First Episode of Psychosis - Strengths Program  Clinician Contact & Progress Note   For Family Education Program     Patient: Amie Santiago (2000)     MRN: [id number]  Diagnosis(es): Psychosis, unspecified psychosis type (H) [F29]  Clinician: Jose Ventura  Service Type: 23240 Family Therapy without patient  Prolonged Care for this visit is not indicated.  Clinical work consists of family therapy.     Date:  5/30/23    Video- Visit Details   Type of service:  video visit  Video start time: 10:02am   Video end time: 11:00am  Originating location (patient location):  [facility]   Location- Home  Distant Site (provider location): HIPAA compliant location Off-site  Platform used for video visit:  Secure real time interactive audio and visual telecommunication system via [employer] Receive    People present:   Family without patient present   Mother - Lizy  Jose Ventura     Intervention:  Motivational Interviewing   Connect info and skills with personal goals  Promote hope and positive expectations  Explore pros and cons of change  Re-frame experiences in positive light    Educational Teaching Strategies   Review of written material/education  Relate information to client's experience  Ask questions to check comprehension  Break down information into small chunks  Adopt client's language     CBT   Reframe Cognitive Distortions (become aware of which distortions you are most vulnerable to, identifying and challenging our harmful automatic thoughts)  Play the Script Until the End (imagine the outcome of the worst case scenario, let the scenario play out, recognize that even if everything feared happens, it will likely turn out okay)  Behavioral Experiments (engage in a  what if  consideration, test existing beliefs  and/or help  test more adaptive beliefs, then modify unhelpful beliefs)  Pleasant Activity Scheduling (scheduling activities in the near  future that you can look forward to, introduced more positivity and reduce negative thinking)  Recognizing the positive (Visualize, write, or discuss the best parts of the day to promote positive thinking patterns)    Psychoeducational Topic(s) Addressed:  Family Member Interview   Treatment Planning  Problem Solving  Crisis Intervention    Techniques utilized:   [place] announced at beginning of session  Review of goal  Present new material  Problem-solving practice  Help client choose a home practice option  Summarize progress made in current session  Identified urgent concerns  Assessed caregiver/family burden  Elicit client/family feedback    Assessment & progress:     The focus of today's session was continuing family member interviews with Irene's mom Lizy. Assessed symptom presence and potential triggers for the patient.  Identified Irene's symptoms since last visit as lack of energy, poor sleep, impaired thinking and anxiety. They reported current psychosocial stressors are work related. Family reported no urgent concerns at the time of session. No safety concerns reported or noted at the time of visit. Patient did not report any changes to medications.    The session started with check in with family reporting no current issues or concerns. After check in, the session proceed with the family member interviews. This writer and Lizy talked through family interview. Lizy shared information about family history, narrative of illness, and provided overview of current understanding of Irene's diagnosis, etc. This writer will update the treatment team and use information to guide educational discussions in coming visits. 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    With regard to family dynamics, overall family seems as if they are able to interact in a  cooperative, pleasant and calm manner.  Helpful clinical techniques utilized during today's appointment appeared to be motivational interviewing, reflective listening, providing validation, and psychoeducation.  As of today's appt insight into Irene's mental illness appears adequate.   Family would benefit from continued clinical intervention aimed at assisting them to implement helpful strategies at home and increase their understanding of psychosis.    Plan/Referrals:   Irene and her family are participating in coordinated speciality care via the Strengths Program within our clinic. Family did express interest in continuing to meet for family therapy and psychoeducation.    Will meet with family bi-weekly for evidence based family psychoeducation and support aimed at maximizing Irene's opportunity for recovery from psychosis.      Crisis Numbers:   Provided routinely in AVS     After hours:  [phone]    Next session: 6/13/23 at 10AM    Treatment plan last completed on: 5/1/23  Next treatment plan update due by: 90 days  Treatment plan was initiated and completed at this visit.   Acknowledged consent of current treatment plan was signed.    Reviewed goals to increase problem solving techniques, communication patterns, and learn about the patient's psychotic experiences with their family.  We discussed relevant progress made, and ways family can help with these goals.      Please EPIC message with any questions or concerns.    PROVIDER: Jose Ventura JOE    Patient staffed in supervision with Christelle Bell who will sign the note.  Supervisor is Christelle Bell.

## 2023-07-25 ENCOUNTER — VIRTUAL VISIT (OUTPATIENT)
Dept: PSYCHIATRY | Facility: CLINIC | Age: 23
End: 2023-07-25
Attending: SOCIAL WORKER
Payer: COMMERCIAL

## 2023-07-25 DIAGNOSIS — F29 PSYCHOSIS, UNSPECIFIED PSYCHOSIS TYPE (H): Primary | ICD-10-CM

## 2023-07-25 PROCEDURE — 90847 FAMILY PSYTX W/PT 50 MIN: CPT | Mod: VID

## 2023-08-03 ENCOUNTER — OFFICE VISIT (OUTPATIENT)
Dept: FAMILY MEDICINE | Facility: CLINIC | Age: 23
End: 2023-08-03
Payer: COMMERCIAL

## 2023-08-03 VITALS
HEART RATE: 95 BPM | SYSTOLIC BLOOD PRESSURE: 100 MMHG | DIASTOLIC BLOOD PRESSURE: 64 MMHG | TEMPERATURE: 98 F | OXYGEN SATURATION: 98 %

## 2023-08-03 DIAGNOSIS — J02.9 ACUTE SORE THROAT: Primary | ICD-10-CM

## 2023-08-03 LAB
DEPRECATED S PYO AG THROAT QL EIA: NEGATIVE
GROUP A STREP BY PCR: NOT DETECTED

## 2023-08-03 PROCEDURE — 99213 OFFICE O/P EST LOW 20 MIN: CPT

## 2023-08-03 PROCEDURE — 87651 STREP A DNA AMP PROBE: CPT | Performed by: NURSE PRACTITIONER

## 2023-08-03 NOTE — PROGRESS NOTES
Assessment & Plan     Acute sore throat    - Streptococcus A Rapid Scr w Reflx to PCR  - Group A Streptococcus PCR Throat Swab      10 minutes spent by me on the date of the encounter doing chart review, review of test results, patient visit, and documentation        Nicotine/Tobacco Cessation:  She reports that she has been smoking vaping device. She has never been exposed to tobacco smoke. She has never used smokeless tobacco.  Nicotine/Tobacco Cessation Plan:         See Patient Instructions    Return in about 1 week (around 8/10/2023), or if symptoms worsen or fail to improve.    [facility]    Filippo Bonilla is a 22 year old, presenting for the following health issues:  Urgent Care and Pharyngitis (Sore throat for 2 days. Tx- nothing.)      HPI     Presents with sore throat for the past 48 hours.  Using Tylenol to help with symptom management.  No headache, GI symptoms or new rash.  No known ill exposures.  No cough runny nose or nasal congestion.      Review of Systems   Constitutional, HEENT, cardiovascular, pulmonary, gi and gu systems are negative, except as otherwise noted.      Objective    /64   Pulse 95   Temp 98  F (36.7  C) (Tympanic)   LMP 07/30/2023   SpO2 98%   There is no height or weight on file to calculate BMI.  Physical Exam   GENERAL: healthy, alert and no distress  EYES: Eyes grossly normal to inspection, PERRL and conjunctivae and sclerae normal  HENT: normal cephalic/atraumatic, ear canals and TM's normal, nose and mouth without ulcers or lesions, oral mucous membranes moist, tonsillar erythema with no exudate.  NECK: no adenopathy  RESP: lungs clear to auscultation - no rales, rhonchi or wheezes  CV: regular rates and rhythm, normal S1 S2, no S3 or S4, and no murmur, click or rub    Results for orders placed or performed in visit on 08/03/23   Streptococcus A Rapid Scr w Reflx to PCR      Status: Normal    Specimen: Throat; Swab   Result Value Ref Range    Group A Strep antigen Negative Negative

## 2023-08-03 NOTE — PATIENT INSTRUCTIONS
Rapid strep was negative. This will reflex to a culture test that will come back tomorrow. If this is positive we will contact you through my chart for treatment

## 2023-08-07 ENCOUNTER — VIRTUAL VISIT (OUTPATIENT)
Dept: PSYCHIATRY | Facility: CLINIC | Age: 23
End: 2023-08-07
Attending: SOCIAL WORKER
Payer: COMMERCIAL

## 2023-08-07 ENCOUNTER — VIRTUAL VISIT (OUTPATIENT)
Dept: FAMILY MEDICINE | Facility: CLINIC | Age: 23
End: 2023-08-07
Payer: COMMERCIAL

## 2023-08-07 DIAGNOSIS — F29 PSYCHOSIS, UNSPECIFIED PSYCHOSIS TYPE (H): Primary | ICD-10-CM

## 2023-08-07 DIAGNOSIS — F33.9 EPISODE OF RECURRENT MAJOR DEPRESSIVE DISORDER, UNSPECIFIED DEPRESSION EPISODE SEVERITY (H): ICD-10-CM

## 2023-08-07 PROCEDURE — 99213 OFFICE O/P EST LOW 20 MIN: CPT | Mod: 95 | Performed by: NURSE PRACTITIONER

## 2023-08-07 PROCEDURE — 90847 FAMILY PSYTX W/PT 50 MIN: CPT | Mod: VID

## 2023-08-07 RX ORDER — BUPROPION HYDROCHLORIDE 150 MG/1
150 TABLET ORAL EVERY MORNING
Qty: 90 TABLET | Refills: 2 | Status: SHIPPED | OUTPATIENT
Start: 2023-08-07 | End: 2023-12-18

## 2023-08-07 RX ORDER — OLANZAPINE 10 MG/1
10 TABLET ORAL AT BEDTIME
Qty: 90 TABLET | Refills: 2 | Status: SHIPPED | OUTPATIENT
Start: 2023-08-07 | End: 2023-11-09

## 2023-08-07 NOTE — PROGRESS NOTES
Irene is a 22 year old who is being evaluated via a billable video visit.      How would you like to obtain your AVS? MyChart  If the video visit is dropped, the invitation should be resent by: Text to cell phone: [phone]  Will anyone else be joining your video visit? No        Assessment & Plan     Episode of recurrent major depressive disorder, unspecified depression episode severity (H)   Stable, tolerating med, no changes at this time.  Refills provided.    - OLANZapine (ZYPREXA) 10 MG tablet; Take 1 tablet (10 mg) by mouth At Bedtime  - buPROPion (WELLBUTRIN XL) 150 MG 24 hr tablet; Take 1 tablet (150 mg) by mouth every morning          Jannette Dc APRKIMBERLY [facility]    Filippo Bonilla is a 22 year old, presenting for the following health issues:  Recheck Medication      History of Present Illness       Reason for visit:  Medications    She eats 2-3 servings of fruits and vegetables daily.She consumes 0 sweetened beverage(s) daily.She exercises with enough effort to increase her heart rate 20 to 29 minutes per day.  She exercises with enough effort to increase her heart rate 3 or less days per week.   She is taking medications regularly.       Medications going well, Working with psychiatry.  Not having any side effects.          Review of Systems   Constitutional, HEENT, cardiovascular, pulmonary, gi and gu systems are negative, except as otherwise noted.      Objective           Vitals:  No vitals were obtained today due to virtual visit.    Physical Exam   GENERAL: Healthy, alert and no distress  EYES: Eyes grossly normal to inspection.  No discharge or erythema, or obvious scleral/conjunctival abnormalities.  RESP: No audible wheeze, cough, or visible cyanosis.  No visible retractions or increased work of breathing.    SKIN: Visible skin clear. No significant rash, abnormal pigmentation or lesions.  NEURO: Cranial nerves grossly intact.  Mentation and speech  appropriate for age.  PSYCH: Mentation appears normal, affect normal/bright, judgement and insight intact, normal speech and appearance well-groomed.                Video-Visit Details    Type of service:  Video Visit   Video Start Time:  0935  Video End Time: 0939    Originating Location (pt. Location): Home    Distant Location (provider location):  Off-site  Platform used for Video Visit: Saul

## 2023-08-15 NOTE — PROGRESS NOTES
[facility]  Psychiatry Clinic  First Episode of Psychosis - Strengths Program  Clinician Contact & Progress Note   For Family Education Program     Patient: Amie Santiago (2000)     MRN: [id number]  Diagnosis(es): Psychosis, unspecified psychosis type (H) [F29]  Clinician: Jose Ventura  Service Type: 84701 Family Therapy with patient  Prolonged Care for this visit is not indicated.  Clinical work consists of family therapy.     Date:  7/25/23    Video- Visit Details   Type of service:  video visit  Video start time: 10:03am   Video end time: 11:00am  Originating location (patient location):  [facility]   Location- Home  Distant Site (provider location): HIPAA compliant location Off-site  Platform used for video visit:  Secure real time interactive audio and visual telecommunication system via [employer]    People present:   Patient with family present   Mother - Lizy  Jose Ventura     Intervention:  Motivational Interviewing   Connect info and skills with personal goals  Promote hope and positive expectations  Explore pros and cons of change  Re-frame experiences in positive light    Educational Teaching Strategies   Review of written material/education  Relate information to client's experience  Ask questions to check comprehension  Break down information into small chunks  Adopt client's language     CBT   Reframe Cognitive Distortions (become aware of which distortions you are most vulnerable to, identifying and challenging our harmful automatic thoughts)  Play the Script Until the End (imagine the outcome of the worst case scenario, let the scenario play out, recognize that even if everything feared happens, it will likely turn out okay)  Behavioral Experiments (engage in a  what if  consideration, test existing beliefs  and/or help  test more adaptive beliefs, then modify unhelpful beliefs)  Pleasant Activity Scheduling (scheduling activities in the near future  that you can look forward to, introduced more positivity and reduce negative thinking)  Recognizing the positive (Visualize, write, or discuss the best parts of the day to promote positive thinking patterns)    Psychoeducational Topic(s) Addressed:  Just the facts - What is psychosis?  Treatment Planning  Problem Solving  Crisis Intervention    Techniques utilized:   [place] announced at beginning of session  Review of goal  Present new material  Problem-solving practice  Help client choose a home practice option  Summarize progress made in current session  Identified urgent concerns  Assessed caregiver/family burden  Elicit client/family feedback    Assessment & progress:     The focus of today's session was check in and starting psychoeducational materials with module focused on psychosis. Assessed symptom presence and potential triggers for the patient.  Identified Irene's symptoms since last visit as lack of energy, poor sleep, impaired thinking and anxiety. They reported current psychosocial stressors are related to acclimating to sober living. Family reported no urgent concerns at the time of session. No safety concerns reported or noted at the time of visit. Patient did not report any changes to medications.    The session started with check in with family reporting no current issues or concerns. Irene reported that things are going well for her and she is looking forward to finding a job to get back to work. After check in, the session proceed with psychoeducation materials. This writer presented materials in a conversational format to elicit feedback, promote discussion, and facilitate question/answer. This writer and family discussed psychosis, symptoms, causes, and treatment recommendations. Family reported information was helpful in improving understanding of psychosis.    With regard to family dynamics, overall family seems as if they are able to interact in a cooperative, pleasant and calm manner.   Helpful clinical techniques utilized during today's appointment appeared to be motivational interviewing, reflective listening, providing validation, and psychoeducation.  As of today's appt insight into Irene's mental illness appears adequate.   Family would benefit from continued clinical intervention aimed at assisting them to implement helpful strategies at home and increase their understanding of psychosis.    Plan/Referrals:   Irene and her family are participating in coordinated speciality care via the Strengths Program within our clinic. Family did express interest in continuing to meet for family therapy and psychoeducation.    Will meet with family bi-weekly for evidence based family psychoeducation and support aimed at maximizing Irene's opportunity for recovery from psychosis.      Crisis Numbers:   Provided routinely in AVS     After hours:  [phone]    Next session: 8/7/23 at 4PM    Treatment plan last completed on: 5/1/23  Next treatment plan update due by: 90 days  Treatment plan was initiated and completed at this visit.   Acknowledged consent of current treatment plan was signed.    Reviewed goals to increase problem solving techniques, communication patterns, and learn about the patient's psychotic experiences with their family.  We discussed relevant progress made, and ways family can help with these goals.      Please EPIC message with any questions or concerns.    PROVIDER: JUAN DAVID Leal    Patient staffed in supervision with Christelle Bell who will sign the note.  Supervisor is Christelle Bell.Answers submitted by the patient for this visit:    Patient Health Questionnaire (Submitted on 7/25/2023)  If you checked off any problems, how difficult have these problems made it for you to do your work, take care of things at home, or get along with other people?: Not difficult at all  PHQ9 TOTAL SCORE: 0

## 2023-08-21 ENCOUNTER — VIRTUAL VISIT (OUTPATIENT)
Dept: PSYCHIATRY | Facility: CLINIC | Age: 23
End: 2023-08-21
Attending: SOCIAL WORKER
Payer: COMMERCIAL

## 2023-08-21 DIAGNOSIS — F29 PSYCHOSIS, UNSPECIFIED PSYCHOSIS TYPE (H): Primary | ICD-10-CM

## 2023-08-21 PROCEDURE — 90847 FAMILY PSYTX W/PT 50 MIN: CPT | Mod: VID

## 2023-08-22 NOTE — PROGRESS NOTES
[facility]  Psychiatry Clinic  First Episode of Psychosis - Strengths Program  Clinician Contact & Progress Note   For Family Education Program     Patient: Amie Santiago (2000)     MRN: [id number]  Diagnosis(es): Psychosis, unspecified psychosis type (H) [F29]  Clinician: Jose Ventura  Service Type: 30912 Family Therapy with patient  Prolonged Care for this visit is not indicated.  Clinical work consists of family therapy.     Date:  7/25/23    Video- Visit Details   Type of service:  video visit  Video start time: 4:01pm   Video end time: 5:00pm  Originating location (patient location):  [facility]   Location- Home  Distant Site (provider location): HIPAA compliant location Off-site  Platform used for video visit:  Secure real time interactive audio and visual telecommunication system via AmWell    People present:   Patient with family present   Mother - Lizy  Jose Ventura     Intervention:  Motivational Interviewing   Connect info and skills with personal goals  Promote hope and positive expectations  Explore pros and cons of change  Re-frame experiences in positive light    Educational Teaching Strategies   Review of written material/education  Relate information to client's experience  Ask questions to check comprehension  Break down information into small chunks  Adopt client's language     CBT   Reframe Cognitive Distortions (become aware of which distortions you are most vulnerable to, identifying and challenging our harmful automatic thoughts)  Play the Script Until the End (imagine the outcome of the worst case scenario, let the scenario play out, recognize that even if everything feared happens, it will likely turn out okay)  Behavioral Experiments (engage in a  what if  consideration, test existing beliefs  and/or help  test more adaptive beliefs, then modify unhelpful beliefs)  Pleasant Activity Scheduling (scheduling activities in the near future that  you can look forward to, introduced more positivity and reduce negative thinking)  Recognizing the positive (Visualize, write, or discuss the best parts of the day to promote positive thinking patterns)    Psychoeducational Topic(s) Addressed:  Just the facts - Medications?  Treatment Planning  Problem Solving  Crisis Intervention    Techniques utilized:   [place] announced at beginning of session  Review of goal  Present new material  Problem-solving practice  Help client choose a home practice option  Summarize progress made in current session  Identified urgent concerns  Assessed caregiver/family burden  Elicit client/family feedback    Assessment & progress:     The focus of today's session was check in and continuing psychoeducational materials with module focused on medications. Assessed symptom presence and potential triggers for the patient.  Identified Irene's symptoms since last visit as lack of energy, poor sleep, impaired thinking and anxiety. They reported current psychosocial stressors are related to meeting expectations of sober living home. Family reported no urgent concerns at the time of session. No safety concerns reported or noted at the time of visit. Patient did not report any changes to medications.    The session started with check in with family reporting no current issues or concerns. Irene reported that she plans to meet with sober come  to discussed sober home expectations; Irene plans to have her mother Lizy also attend the meeting with . After check in, the session proceed with psychoeducation materials focused on medications. This writer presented materials in a conversational format to elicit feedback, promote discussion, and facilitate question/answer. This writer and family discussed medications, side effects, and pros/cons of taking medications. Family reported information was helpful in improving understanding the role of medications in treatment of  psychosis.    With regard to family dynamics, overall family seems as if they are able to interact in a cooperative, pleasant and calm manner.  Helpful clinical techniques utilized during today's appointment appeared to be motivational interviewing, reflective listening, providing validation, and psychoeducation.  As of today's appt insight into [place] mental illness appears adequate.   Family would benefit from continued clinical intervention aimed at assisting them to implement helpful strategies at home and increase their understanding of psychosis.    Plan/Referrals:   Irene and her family are participating in coordinated speciality care via the Strengths Program within our clinic. Family did express interest in continuing to meet for family therapy and psychoeducation.    Will meet with family bi-weekly for evidence based family psychoeducation and support aimed at maximizing Irene's opportunity for recovery from psychosis.      Crisis Numbers:   Provided routinely in AVS     After hours:  [phone]    Next session: 8/21/23 at 4PM    Treatment plan last completed on: 5/1/23  Next treatment plan update due by: 90 days  Treatment plan was initiated and completed at this visit.   Acknowledged consent of current treatment plan was signed.    Reviewed goals to increase problem solving techniques, communication patterns, and learn about the patient's psychotic experiences with their family.  We discussed relevant progress made, and ways family can help with these goals.      Please EPIC message with any questions or concerns.    PROVIDER: Jose Ventura JOE    Patient staffed in supervision with Christelle Bell who will sign the note.  Supervisor is Christelle Bell.

## 2023-08-25 NOTE — PROGRESS NOTES
[facility]  Psychiatry Clinic  First Episode of Psychosis - Strengths Program  Clinician Contact & Progress Note   For Family Education Program     Patient: Amie Santiago (2000)     MRN: [id number]  Diagnosis(es): Psychosis, unspecified psychosis type (H) [F29]  Clinician: Jose Ventura  Service Type: [id number] Family Therapy with patient  Prolonged Care for this visit is not indicated.  Clinical work consists of family therapy.     Date:  8/21/23    Video- Visit Details   Type of service:  video visit  Video start time: 4:01pm   Video end time: 5:00pm  Originating location (patient location):  [facility]   Location- Home  Distant Site (provider location): HIPAA compliant location Off-site  Platform used for video visit:  Secure real time interactive audio and visual telecommunication system via AmWell    People present:   Patient with family present   Mother - Lizy  Jose Ventura     Intervention:  Motivational Interviewing   Connect info and skills with personal goals  Promote hope and positive expectations  Explore pros and cons of change  Re-frame experiences in positive light    Educational Teaching Strategies   Review of written material/education  Relate information to client's experience  Ask questions to check comprehension  Break down information into small chunks  Adopt client's language     CBT   Reframe Cognitive Distortions (become aware of which distortions you are most vulnerable to, identifying and challenging our harmful automatic thoughts)  Play the Script Until the End (imagine the outcome of the worst case scenario, let the scenario play out, recognize that even if everything feared happens, it will likely turn out okay)  Behavioral Experiments (engage in a  what if  consideration, test existing beliefs  and/or help  test more adaptive beliefs, then modify unhelpful beliefs)  Pleasant Activity Scheduling (scheduling activities in the near future that  you can look forward to, introduced more positivity and reduce negative thinking)  Recognizing the positive (Visualize, write, or discuss the best parts of the day to promote positive thinking patterns)    Psychoeducational Topic(s) Addressed:  Just the facts - Coping with Stress  Treatment Planning  Problem Solving  Crisis Intervention    Techniques utilized:   [place] announced at beginning of session  Review of goal  Present new material  Problem-solving practice  Help client choose a home practice option  Summarize progress made in current session  Identified urgent concerns  Assessed caregiver/family burden  Elicit client/family feedback    Assessment & progress:     The focus of today's session was check in and continuing psychoeducational materials with module focused on medications. Assessed symptom presence and potential triggers for the patient.  Identified Irene's symptoms since last visit as lack of energy, poor sleep, impaired thinking and anxiety. They reported current psychosocial stressors are related to meeting expectations of sober living home. Family reported no urgent concerns at the time of session. No safety concerns reported or noted at the time of visit. Patient did not report any changes to medications.    The session started with check in with family reporting no current issues or concerns. Irene reported meeting with [facility]  and creating a plan to meet expectations; family reported no related concerns about expectations. After check in, the session proceed with psychoeducation materials focused on stress management. This writer presented materials in a conversational format to elicit feedback, promote discussion, and facilitate question/answer. This writer and family discussed signs of stress and stress management strategies. Irene agreed to practice talking to others strategy to manage stress over next week.    With regard to family dynamics, overall family seems as if  they are able to interact in a cooperative, pleasant and calm manner.  Helpful clinical techniques utilized during today's appointment appeared to be motivational interviewing, reflective listening, providing validation, and psychoeducation.  As of today's appt insight into Irene's mental illness appears adequate.   Family would benefit from continued clinical intervention aimed at assisting them to implement helpful strategies at home and increase their understanding of psychosis.    Plan/Referrals:   Irene and her family are participating in coordinated speciality care via the Strengths Program within our clinic. Family did express interest in continuing to meet for family therapy and psychoeducation.    Will meet with family bi-weekly for evidence based family psychoeducation and support aimed at maximizing Irene's opportunity for recovery from psychosis.      Crisis Numbers:   Provided routinely in AVS     After hours:  [phone]    Next session: 9/6/23 at 4PM    Treatment plan last completed on: 5/1/23  Next treatment plan update due by: 90 days  Treatment plan was initiated and completed at this visit.   Acknowledged consent of current treatment plan was signed.    Reviewed goals to increase problem solving techniques, communication patterns, and learn about the patient's psychotic experiences with their family.  We discussed relevant progress made, and ways family can help with these goals.      Please EPIC message with any questions or concerns.    PROVIDER: JUAN DAVID Leal    Patient staffed in supervision with Christelle Bell who will sign the note.  Supervisor is Christelle Bell.

## 2023-09-06 ENCOUNTER — VIRTUAL VISIT (OUTPATIENT)
Dept: PSYCHIATRY | Facility: CLINIC | Age: 23
End: 2023-09-06
Attending: SOCIAL WORKER
Payer: COMMERCIAL

## 2023-09-06 DIAGNOSIS — F29 PSYCHOSIS, UNSPECIFIED PSYCHOSIS TYPE (H): Primary | ICD-10-CM

## 2023-09-06 PROCEDURE — 90847 FAMILY PSYTX W/PT 50 MIN: CPT | Mod: VID

## 2023-09-30 NOTE — PROGRESS NOTES
[facility]  Psychiatry Clinic  First Episode of Psychosis - Strengths Program  Clinician Contact & Progress Note   For Family Education Program     Patient: Amie Santiago (2000)     MRN: [id number]  Diagnosis(es): Psychosis, unspecified psychosis type (H) [F29]  Clinician: Jose Ventura  Service Type: 51783 Family Therapy with patient  Prolonged Care for this visit is not indicated.  Clinical work consists of family therapy.     Date:  9/06/23    Video- Visit Details   Type of service:  video visit  Video start time: 4:01pm   Video end time: 5:00pm  Originating location (patient location):  [facility]   Location- Home  Distant Site (provider location): HIPAA compliant location Off-site  Platform used for video visit:  Secure real time interactive audio and visual telecommunication system via [employer]    People present:   Patient with family present   Mother - Lizy  Jose Ventura     Intervention:  Motivational Interviewing   Connect info and skills with personal goals  Promote hope and positive expectations  Explore pros and cons of change  Re-frame experiences in positive light    Educational Teaching Strategies   Review of written material/education  Relate information to client's experience  Ask questions to check comprehension  Break down information into small chunks  Adopt client's language     CBT   Reframe Cognitive Distortions (become aware of which distortions you are most vulnerable to, identifying and challenging our harmful automatic thoughts)  Play the Script Until the End (imagine the outcome of the worst case scenario, let the scenario play out, recognize that even if everything feared happens, it will likely turn out okay)  Behavioral Experiments (engage in a  what if  consideration, test existing beliefs  and/or help  test more adaptive beliefs, then modify unhelpful beliefs)  Pleasant Activity Scheduling (scheduling activities in the near future that  you can look forward to, introduced more positivity and reduce negative thinking)  Recognizing the positive (Visualize, write, or discuss the best parts of the day to promote positive thinking patterns)    Psychoeducational Topic(s) Addressed:  Just the facts - Coping with Stress  Treatment Planning  Problem Solving  Crisis Intervention    Techniques utilized:   Merlin announced at beginning of session  Review of goal  Present new material  Problem-solving practice  Help client choose a home practice option  Summarize progress made in current session  Identified urgent concerns  Assessed caregiver/family burden  Elicit client/family feedback    Assessment & progress:     The focus of today's session was check in and continuing psychoeducational materials with module focused on coping with stress Assessed symptom presence and potential triggers for the patient.  Identified Irene's symptoms since last visit as lack of energy, poor sleep, impaired thinking and anxiety. They reported current psychosocial stressors are related to starting school. Family reported no urgent concerns at the time of session. No safety concerns reported or noted at the time of visit. Patient did not report any changes to medications.    The session started with check in with family reporting no current issues or concerns. After check in, the session proceed with psychoeducation materials focused on stress management. This writer presented materials in a conversational format to elicit feedback, promote discussion, and facilitate question/answer. This writer and family roleplayed and practiced coping with stress techniques including deep breathing, muscle relaxation, and guided meditation.    With regard to family dynamics, overall family seems as if they are able to interact in a cooperative, pleasant and calm manner.  Helpful clinical techniques utilized during today's appointment appeared to be motivational interviewing, reflective  listening, providing validation, and psychoeducation.  As of today's appt insight into Irene's mental illness appears adequate.   Family would benefit from continued clinical intervention aimed at assisting them to implement helpful strategies at home and increase their understanding of psychosis.    Plan/Referrals:   Irene and her family are participating in coordinated speciality care via the Strengths Program within our clinic. Family did express interest in continuing to meet for family therapy and psychoeducation.    Will meet with family bi-weekly for evidence based family psychoeducation and support aimed at maximizing Irene's opportunity for recovery from psychosis.      Crisis Numbers:   Provided routinely in AVS     After hours:  [phone]    Next session: TBD    Treatment plan last completed on: 5/1/23  Next treatment plan update due by: 90 days  Treatment plan was initiated and completed at this visit.   Acknowledged consent of current treatment plan was signed.    Reviewed goals to increase problem solving techniques, communication patterns, and learn about the patient's psychotic experiences with their family.  We discussed relevant progress made, and ways family can help with these goals.      Please EPIC message with any questions or concerns.    PROVIDER: JUAN DAVID Leal    Patient staffed in supervision with Christelle Bell who will sign the note.  Supervisor is Christelle Bell.

## 2023-10-09 ENCOUNTER — VIRTUAL VISIT (OUTPATIENT)
Dept: PSYCHIATRY | Facility: CLINIC | Age: 23
End: 2023-10-09
Attending: SOCIAL WORKER
Payer: COMMERCIAL

## 2023-10-09 ENCOUNTER — TELEPHONE (OUTPATIENT)
Dept: PSYCHIATRY | Facility: CLINIC | Age: 23
End: 2023-10-09
Payer: COMMERCIAL

## 2023-10-09 DIAGNOSIS — F29 PSYCHOSIS, UNSPECIFIED PSYCHOSIS TYPE (H): Primary | ICD-10-CM

## 2023-10-09 PROCEDURE — 90846 FAMILY PSYTX W/O PT 50 MIN: CPT | Mod: VID

## 2023-10-09 NOTE — TELEPHONE ENCOUNTER
Pts mother called clinic. Irene was at friend's last night and friend became scared. Irene sent to [facility]. They assessed and deemed she did not meet criteria to stay, discharged her. She called mom to  in middle of night. Mom concerned about her own safety and wanted to wait until this morning so she could consult with others.    Irene has history of using knives/swords when psychotic. She was found with two butter knifes on her (likely to protect herself?).     Irene has since turned off her  on phone. Last time mom checked-she was moving away from the hospital-either by bus or car.     Mom has left home due to concern about safety. Currently at work.    Called [facility] to update them. Since there is no known whereabouts they cannot go out and see her. Writer suggested mom call police in [place] to also alert to potential mental health crisis. Also discussed crisis residences-however unsure if Irene is stable enough for crisis bed.    Will check with nursing team about waiting list at [facility]. Will forward to Strengths team for further consult.    RADHA Little, [facility]  [phone]

## 2023-10-20 NOTE — PROGRESS NOTES
[facility]  Psychiatry Clinic  First Episode of Psychosis - Strengths Program  Clinician Contact & Progress Note   For Family Education Program     Patient: Amie Santiago (2000)     MRN: [id number]  Diagnosis(es): Psychosis, unspecified psychosis type (H) [F29]  Clinician: Jose Ventura  Service Type: 99922 Family Therapy without patient  Prolonged Care for this visit is not indicated.  Clinical work consists of family therapy.     Date:  10/09/23    Video- Visit Details   Type of service:  video visit  Video start time: 4:05pm   Video end time: 4:35pm  Originating location (patient location):  [facility]   Location- Home  Distant Site (provider location): HIPAA compliant location Off-site  Platform used for video visit:  Secure real time interactive audio and visual telecommunication system via [employer]    People present:   Family without patient present   Mother - Lizy  Jose Ventura     Intervention:  Motivational Interviewing   Connect info and skills with personal goals  Promote hope and positive expectations  Explore pros and cons of change  Re-frame experiences in positive light    Educational Teaching Strategies   Review of written material/education  Relate information to client's experience  Ask questions to check comprehension  Break down information into small chunks  Adopt client's language     CBT   Reframe Cognitive Distortions (become aware of which distortions you are most vulnerable to, identifying and challenging our harmful automatic thoughts)  Play the Script Until the End (imagine the outcome of the worst case scenario, let the scenario play out, recognize that even if everything feared happens, it will likely turn out okay)  Behavioral Experiments (engage in a  what if  consideration, test existing beliefs  and/or help  test more adaptive beliefs, then modify unhelpful beliefs)  Pleasant Activity Scheduling (scheduling activities in the near  future that you can look forward to, introduced more positivity and reduce negative thinking)  Recognizing the positive (Visualize, write, or discuss the best parts of the day to promote positive thinking patterns)    Psychoeducational Topic(s) Addressed:  Treatment Planning  Problem Solving  Crisis Intervention    Techniques utilized:   [place] announced at beginning of session  Review of goal  Present new material  Problem-solving practice  Help client choose a home practice option  Summarize progress made in current session  Identified urgent concerns  Assessed caregiver/family burden  Elicit client/family feedback    Assessment & progress:     The focus of today's session was check in and problem solving. Assessed symptom presence and potential triggers for the patient.  Identified Matildas symptoms since last visit as lack of energy, poor sleep, trouble concentrating, irritability, agitation, impaired thinking, aggression / hostile, impulsivity / disinhibition, negativistic / defiant, paranoid thoughts, disorganized behaviors and/or thinking, and anxiety. They reported current psychosocial stressors are related to being kicked out of sober living and recent symptom relapse. Family reports urgent concerns at the time of session due to acute symptoms and homelessness. No safety concerns reported or noted at the time of visit. Patient reported changes to current medication list and has been directed to notify the appropriate medical provider.    The session started with check in with family reporting Irene has been kicked out of her sober living home due to relapse. Since being kicked out Irene was staying at a friend's who was uncomfortable due to aggressive behavior. Since leaving friend's home over weekend Irene was mendez to ER but was not admitted. Since leaving ER Irene has been unhoused. Today, Lizy reported calling Crisis services and 911 but Irene refused support and would not go to the ER. This writer and  family discussed supports including shelters, crisis lines, and community. This writer provided resources for [facility] a nearby shelter for Irene and resources for Civil Commitment. This writer and Lizy agreed to continued check-in as the week proceeds with hopes that Irene will eventually agree to hospitalization.     With regard to family dynamics, overall family seems as if they are able to interact in a cooperative, pleasant and calm manner.  Helpful clinical techniques utilized during today's appointment appeared to be motivational interviewing, reflective listening, providing validation, and psychoeducation.  As of today's appt insight into Matildas mental illness appears adequate.   Family would benefit from continued clinical intervention aimed at assisting them to implement helpful strategies at home and increase their understanding of psychosis.    Plan/Referrals:   Irene and her family are participating in coordinated speciality care via the Strengths Program within our clinic. Family did express interest in continuing to meet for family therapy and psychoeducation.    Will meet with family bi-weekly for evidence based family psychoeducation and support aimed at maximizing Irene's opportunity for recovery from psychosis.      Crisis Numbers:   Provided routinely in AVS     After hours:  [phone]    Next session: TBD    Treatment plan last completed on: 5/1/23  Next treatment plan update due by: 90 days  Treatment plan was initiated and completed at this visit.   Acknowledged consent of current treatment plan was signed.    Reviewed goals to increase problem solving techniques, communication patterns, and learn about the patient's psychotic experiences with their family.  We discussed relevant progress made, and ways family can help with these goals.      Please EPIC message with any questions or concerns.    PROVIDER: Jose Ventura, [facility]    Patient staffed in supervision with Christelle Bell  who will sign the note.  Supervisor is Christelle Bell.

## 2023-10-23 ENCOUNTER — TELEPHONE (OUTPATIENT)
Dept: BEHAVIORAL HEALTH | Facility: CLINIC | Age: 23
End: 2023-10-23
Payer: COMMERCIAL

## 2023-10-25 NOTE — TELEPHONE ENCOUNTER
JOE [facility] attempted to call pt for [facility] enrollment offer. Pt did not answer call. Voicemail box was full.     STEFANIE De La Cruz  [facility] ([facility])   [facility]  [phone]

## 2023-11-07 ENCOUNTER — VIRTUAL VISIT (OUTPATIENT)
Dept: PSYCHIATRY | Facility: CLINIC | Age: 23
End: 2023-11-07
Attending: SOCIAL WORKER
Payer: COMMERCIAL

## 2023-11-07 DIAGNOSIS — F29 PSYCHOSIS, UNSPECIFIED PSYCHOSIS TYPE (H): Primary | ICD-10-CM

## 2023-11-07 PROCEDURE — 90846 FAMILY PSYTX W/O PT 50 MIN: CPT | Mod: VID

## 2023-11-09 ENCOUNTER — OFFICE VISIT (OUTPATIENT)
Dept: PSYCHIATRY | Facility: CLINIC | Age: 23
End: 2023-11-09
Attending: PSYCHIATRY & NEUROLOGY
Payer: COMMERCIAL

## 2023-11-09 ENCOUNTER — TELEPHONE (OUTPATIENT)
Dept: BEHAVIORAL HEALTH | Facility: CLINIC | Age: 23
End: 2023-11-09
Payer: COMMERCIAL

## 2023-11-09 VITALS
WEIGHT: 125.6 LBS | HEART RATE: 103 BPM | SYSTOLIC BLOOD PRESSURE: 101 MMHG | BODY MASS INDEX: 22.97 KG/M2 | DIASTOLIC BLOOD PRESSURE: 68 MMHG

## 2023-11-09 DIAGNOSIS — F29 PSYCHOSIS, UNSPECIFIED PSYCHOSIS TYPE (H): Primary | ICD-10-CM

## 2023-11-09 DIAGNOSIS — F12.90 CANNABIS USE DISORDER: ICD-10-CM

## 2023-11-09 DIAGNOSIS — R69 DIAGNOSIS DEFERRED: Primary | ICD-10-CM

## 2023-11-09 PROCEDURE — 90792 PSYCH DIAG EVAL W/MED SRVCS: CPT | Mod: GC

## 2023-11-09 RX ORDER — OLANZAPINE 10 MG/1
10 TABLET ORAL AT BEDTIME
Qty: 90 TABLET | Refills: 2 | Status: SHIPPED | OUTPATIENT
Start: 2023-11-09 | End: 2023-12-07

## 2023-11-09 ASSESSMENT — PAIN SCALES - GENERAL: PAINLEVEL: NO PAIN (0)
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
## 2023-11-09 NOTE — TELEPHONE ENCOUNTER
Per ED Alert referral from [facility], PRS called patient and to discuss services provided in [facility] program.  Recording stats call cannot be completed as dialed.  PRS unable to left a voicemail message.    Jaden Godinez  Peer   [facility]  [phone]

## 2023-11-10 ENCOUNTER — TELEPHONE (OUTPATIENT)
Dept: PSYCHIATRY | Facility: CLINIC | Age: 23
End: 2023-11-10
Payer: COMMERCIAL

## 2023-11-10 NOTE — TELEPHONE ENCOUNTER
[facility] Call Center    Phone Message    May a detailed message be left on voicemail: yes     Reason for Call: Other: tk I have Dani the nurse from [facility] calling for  Irene who [place] [facility] the nurse has stated that the pt medication list is wrong and they are some missing medications. 1.Olanzapine 10 mg twice a day. 2. Vitamin D 2,000 units daily. 3. Drisdol 50,000 units once weekly the nurse has stated if Dr. Soares nurse can give him a call     Action Taken: Other: nurse pool    Travel Screening: Not Applicable

## 2023-11-10 NOTE — TELEPHONE ENCOUNTER
Writer returned JUAN Louise's call.     Dani states that patient had an appointment yesterday, but patient returned with an AVS that showed some old medications. Dani states he sent an updated med list with patient to the appointment as she was recently [place] [facility] [place].     AVS showing medications that patient was previously on (Claritin and welbutrin)    If AVS needs to be updated, it can be faxed to [facility] at [phone]. Dani also wanted to ensure that pharmacy was updated.      Dani will also fax updated med list to clinic, but stated patient's current medication list has the following:    Olanzapine 10 mg twice daily  Vitamin D 2,000 units daily  Drisdol 50,000 units once weekly

## 2023-12-01 ENCOUNTER — VIRTUAL VISIT (OUTPATIENT)
Dept: PSYCHIATRY | Facility: CLINIC | Age: 23
End: 2023-12-01
Attending: SOCIAL WORKER
Payer: COMMERCIAL

## 2023-12-01 DIAGNOSIS — F29 PSYCHOSIS, UNSPECIFIED PSYCHOSIS TYPE (H): Primary | ICD-10-CM

## 2023-12-01 PROCEDURE — 90846 FAMILY PSYTX W/O PT 50 MIN: CPT | Mod: VID

## 2023-12-05 NOTE — PATIENT INSTRUCTIONS
Thanks for coming today.  Ortho/Sports Medicine Clinic  [address] [place] [address]    To schedule future appointments in Ortho Clinic, you may call [phone].    To schedule ordered imaging by your Provider: Call [facility] at [phone]    SiteMinder available online at:   [email or web address]    Please call if any further questions or concerns [phone] and ask for the Orthopedic Department. Clinic hours 8 am to 5 pm.    Return to clinic if symptoms worsen.    
Additional EKG Note...

## 2023-12-05 NOTE — PROGRESS NOTES
[facility]  Psychiatry Clinic  First Episode of Psychosis - Strengths Program  Clinician Contact & Progress Note   For Family Education Program     Patient: Amie Santiago (2000)     MRN: [id number]  Diagnosis(es): Psychosis, unspecified psychosis type (H) [F29]  Clinician: Jose Ventura  Service Type: 65562 Family Therapy without patient  Prolonged Care for this visit is not indicated.  Clinical work consists of family therapy.     Date:  11/07/23    Video- Visit Details   Type of service:  video visit  Video start time: 2:03pm   Video end time: 2:55pm  Originating location (patient location):  [facility]   Location- Home  Distant Site (provider location): HIPAA compliant location Off-site  Platform used for video visit:  Secure real time interactive audio and visual telecommunication system via [employer]    People present:   Family without patient present   Mother - Lizy  Jose Ventura     Intervention:  Motivational Interviewing   Connect info and skills with personal goals  Promote hope and positive expectations  Explore pros and cons of change  Re-frame experiences in positive light    Educational Teaching Strategies   Review of written material/education  Relate information to client's experience  Ask questions to check comprehension  Break down information into small chunks  Adopt client's language     CBT   Reframe Cognitive Distortions (become aware of which distortions you are most vulnerable to, identifying and challenging our harmful automatic thoughts)  Behavioral Experiments (engage in a  what if  consideration, test existing beliefs  and/or help  test more adaptive beliefs, then modify unhelpful beliefs)  Pleasant Activity Scheduling (scheduling activities in the near future that you can look forward to, introduced more positivity and reduce negative thinking)  Recognizing the positive (Visualize, write, or discuss the best parts of the day to promote  positive thinking patterns)    Psychoeducational Topic(s) Addressed:  LEAP METHOD  Treatment Planning  Problem Solving  Crisis Intervention    Techniques utilized:   [place] announced at beginning of session  Review of goal  Present new material  Problem-solving practice  Help client choose a home practice option  Summarize progress made in current session  Identified urgent concerns  Assessed caregiver/family burden  Elicit client/family feedback    Assessment & progress:     The focus of today's session was check in and problem solving. Assessed symptom presence and potential triggers for the patient.  Identified Irene's symptoms since last visit as irritability, agitation, impaired thinking, aggression / hostile, impulsivity / disinhibition, negativistic / defiant, paranoid thoughts, disorganized behaviors and/or thinking, and anxiety. They reported current psychosocial stressors are related to recent hospitalization and aftercare. Family reports no urgent concerns at the time of session. No safety concerns reported or noted at the time of visit. Patient did not report any changes to medications.    The session started with check in with family reporting Irene has recently been discharged from inpatient and is now residing at an [facility] facility. Family reports that Irene is taking medications but is refusing other supports including therapy and resources. Family reports continued lack of insight, delusions,  and paranoid thinking have strained family relationships. Family reports limited communication and lack of response to attempted check-ins. This writer and family reviewed LEAP method to support strained communication and repair of strained relationships.     With regard to family dynamics, overall family seems as if they are able to interact in a cooperative, pleasant and calm manner.  Helpful clinical techniques utilized during today's appointment appeared to be motivational interviewing, reflective listening,  providing validation, and psychoeducation.  As of today's appt insight into Irene's mental illness appears adequate.   Family would benefit from continued clinical intervention aimed at assisting them to implement helpful strategies at home and increase their understanding of psychosis.    Plan/Referrals:   Irene and her family are participating in coordinated speciality care via the Strengths Program within our clinic. Family did express interest in continuing to meet for family therapy and psychoeducation.    Will meet with family bi-weekly for evidence based family psychoeducation and support aimed at maximizing Irene's opportunity for recovery from psychosis.      Crisis Numbers:   Provided routinely in AVS     After hours:  [phone]    Next session: TBD    Treatment plan last completed on: 11/7/23  Next treatment plan update due by: 90 days  Treatment plan was reviewed and updated at this visit.   Acknowledged consent of current treatment plan was signed.    Reviewed goals to increase problem solving techniques, communication patterns, and learn about the patient's psychotic experiences with their family.  We discussed relevant progress made, and ways family can help with these goals.      Please EPIC message with any questions or concerns.    PROVIDER: Jose Ventura JOE    Patient staffed in supervision with Christelle Bell who will sign the note.  Supervisor is Christelle Bell.

## 2023-12-07 DIAGNOSIS — F33.9 EPISODE OF RECURRENT MAJOR DEPRESSIVE DISORDER, UNSPECIFIED DEPRESSION EPISODE SEVERITY (H): ICD-10-CM

## 2023-12-07 RX ORDER — OLANZAPINE 10 MG/1
10 TABLET ORAL 2 TIMES DAILY
Qty: 180 TABLET | Refills: 2 | Status: CANCELLED | OUTPATIENT
Start: 2023-12-07

## 2023-12-07 RX ORDER — OLANZAPINE 10 MG/1
10 TABLET ORAL 2 TIMES DAILY
Qty: 180 TABLET | Refills: 2 | Status: SHIPPED | OUTPATIENT
Start: 2023-12-07 | End: 2024-03-07

## 2023-12-07 NOTE — TELEPHONE ENCOUNTER
Writer spoke with Meri and advised that patient's olanzapine prescription should be 10 mg at bedtime.     Meri wondering if this was changed at least visit, as she states when patient got there, she was on 10 mg BID.     If it should be 10 mg at bedtime, Meri requests we fax updated med list to: [phone]

## 2023-12-07 NOTE — TELEPHONE ENCOUNTER
Health Call Center    Phone Message    May a detailed message be left on voicemail: yes     Reason for Call: Medication Question or concern regarding medication   Prescription Clarification  Name of Medication: Olanzapine 10mg  Prescribing Provider: Dr. Soares   Pharmacy: [facility] [address]    What on the order needs clarification? Patient's group home called regarding the patient's prescription of Olanzapine 10mg. The group home wanted to get clarification on the instructions for this prescription--as they believed the prescription was meant to be taken twice daily, however it is currently written to be taken only once daily at bedtime. The group home was hoping to have the prescription re-sent to the pharmacy, with updated instructions.      Action Taken: Message routed to:  Other: [facility] psychiatry nurse [place]    Travel Screening: Not Applicable                                                                    Dictation #[id number]

## 2023-12-07 NOTE — TELEPHONE ENCOUNTER
Nabil Soares MD   to Me   JF    12/7/23  3:19 PM   Yes, that was the list patent showed me, reflected 10 mg PO at bedtime only. If this was not accurate and staff confirms sshe had continued receiving 10 mg BID at the facility then we can send the updated prescription.  Please let me know  Nabil    Follow Up:  Writer called Meri back and she states that the intake paperwork from hospital had 10 mg BID and this is what they have been administering for patient. Meri will also fax their med list to the clinic.

## 2023-12-08 ENCOUNTER — TELEPHONE (OUTPATIENT)
Dept: PSYCHIATRY | Facility: CLINIC | Age: 23
End: 2023-12-08
Payer: COMMERCIAL

## 2023-12-08 ENCOUNTER — TELEPHONE (OUTPATIENT)
Dept: PEDIATRICS | Facility: CLINIC | Age: 23
End: 2023-12-08
Payer: COMMERCIAL

## 2023-12-08 NOTE — TELEPHONE ENCOUNTER
See refill encounter. Received two pages from [facility] (including cover page ) . One page of medication list.    Sent to scan and copy in Dr. Soares's folder up front.    Lashay Rubio on 12/8/2023 at 10:12 AM

## 2023-12-08 NOTE — TELEPHONE ENCOUNTER
Meri Caldera calling from [facility] needing refills for the following:     Vitamin D3: 1000 mg, 2 tablets 1 time daily   This was prescribed in the hospital and informed Meri that it doesn't look like Dr. Echevarria has prescribed this recently.     Drisdol: 1250 mcg (50,000 unit) take 1 cap orally 1 time weekly  Informed Meri that this is also not on our records.     Will route to Dr. Echevarria to review.     If prescribed, need sent to:      [facility]- [place].   Magaly Goldman RN

## 2023-12-09 NOTE — TELEPHONE ENCOUNTER
Can you call back to the residential treatment center and see if they have a primary care provider they work with for medical needs for their residents?  I haven't seen Amie in over a year and she need to establish with an adult provider.    If they don't have anyone, I can prescribe the Vitamin D for now, but it would be ideal if they could help her get connected with a new PCP.    Thanks,    Letty

## 2023-12-13 NOTE — TELEPHONE ENCOUNTER
Residency center called back and left voicemail to call again.     Called back and no one answered. Will try again later.     Magaly Goldman RN

## 2023-12-14 NOTE — TELEPHONE ENCOUNTER
Call placed to Meri Caldera with [facility] she informed because they are a 90 day program they do not have any Primary care providers staffed at their site and they do not have any specific recommendations for primary care providers for their residents to see. They typically any PCP that they are established with. Routed to Dr. Echevarria to update her.

## 2023-12-19 NOTE — TELEPHONE ENCOUNTER
I would recommend that Irene get established with a primary care provider.  The [facility] clinic might be a nice option, she could call [phone] to make an appointment.  Can you pass that information along to her residential center?

## 2023-12-19 NOTE — PATIENT INSTRUCTIONS
**For crisis resources, please see the information at the end of this document**   Patient Education    Thank you for coming to the [facility] MENTAL HEALTH & ADDICTION [place] CLINIC.     Lab Testing:  If you had lab testing today and your results are reassuring or normal they will be mailed to you or sent through What [employer] T[employer] within 7 days. If the lab tests need quick action we will call you with the results. The phone number we will call with results is # [phone]. If this is not the best number please call our clinic and change the number.     Medication Refills:  If you need any refills please call your pharmacy and they will contact us. Our fax number for refills is [phone].   Three business days of notice are needed for general medication refill requests.   Five business days of notice are needed for controlled substance refill requests.   If you need to change to a different pharmacy, please contact the new pharmacy directly. The new pharmacy will help you get your medications transferred.     Contact Us:  Please call [phone] during business hours (8-5:00 M-F).   If you have medication related questions after clinic hours, or on the weekend, please call [phone].     Financial Assistance [phone]   Medical Records [phone]       MENTAL HEALTH CRISIS RESOURCES:  For a emergency help, please call 911 or go to the nearest Emergency Department.     Emergency Walk-In Options:   EmPATH Unit @ [facility] ([place]): [phone] - Specialized mental health emergency area designed to be calming  [facility] ([place]): [phone]  [facility] Acute Psychiatry Services ([place]): [phone]  [facility]): [phone]    [facility] Crisis Information:   [place]: [phone]  [place]: [phone]  Radha (DARRYN) - Adult: [phone]     Child: [phone]  [place] - Adult: [phone]     Child: [phone]  [place]:  [phone]  List of all [facility] resources:   https://[place].gov/dhs/people-we-serve/adults/health-care/mental-health/resources/crisis-contacts.jsp    National Crisis Information:   Crisis Text Line: Text  [place]  to 805533  Suicide & Crisis Lifeline: 988  National Suicide Prevention Lifeline: 2-912-296-TALK (1-516.348.4589)       For online chat options, visit https://suicidepreventionlifeline.org/chat/  Poison Control Center: 0-172-754-2961  Trans Lifeline: 1-858-569-0550 - Hotline for transgender people of all ages  The Jacoby Project: 8-395-521-4346 - Hotline for LGBT youth     For Non-Emergency Support:   Fast Tracker: Mental Health & Substance Use Disorder Resources -   https://www.OutrightckQuantrosn.org/

## 2023-12-27 NOTE — TELEPHONE ENCOUNTER
Call placed to [facility], spoke with Yasmine RN at [facility], she informed me they do have an MARCEL, she is going to fax it to us today, gave Yasmine the fax number. Relayed message from Dr. Jolly regarding setting up adult pcp. Also sent Irene a mychart message as well. Yasmine informed me she is meeting with Irene today so she will work with Irene on calling [facility] to get an appt set up to manage health/any medications.

## 2024-01-02 ENCOUNTER — TELEPHONE (OUTPATIENT)
Dept: PSYCHIATRY | Facility: CLINIC | Age: 24
End: 2024-01-02
Payer: COMMERCIAL

## 2024-01-02 NOTE — TELEPHONE ENCOUNTER
Health Call Center    Phone Message    May a detailed message be left on voicemail: yes     Reason for Call: Medication Refill Request    Has the patient contacted the pharmacy for the refill? Yes   Name of medication being requested: Buspirone 5,000 units  Provider who prescribed the medication: N/A - Caller stated Dr. Soares, but medication is not listed in chart  Pharmacy:    [id number] [place], [place] - [address] [place] [address] [place]   Date medication is needed: Patient has been off this medication for at least a month.    Caller stated if RN has any questions, to call and ask for Tha.    Action Taken: Message routed to:  Other: [facility] Psychiatry Clinic Nurse pool    Travel Screening: Not Applicable

## 2024-01-02 NOTE — TELEPHONE ENCOUNTER
Writer returned call to Tha (no consent on file) and requested a consent be faxed to the clinic. Tha agrees to do this.     Tha also clarified that patient has been out of Drisdol 50,000 units for almost 4 weeks.

## 2024-01-04 ENCOUNTER — TELEPHONE (OUTPATIENT)
Dept: PSYCHIATRY | Facility: CLINIC | Age: 24
End: 2024-01-04
Payer: COMMERCIAL

## 2024-01-04 NOTE — TELEPHONE ENCOUNTER
On 01/02/2024 the patient signed an MARCEL authorizing the release of records from [facility] to [facility].  I sent the document to medical records and scanning on 01/04/2024.    - Yogesh Cali, Visit Facilitator

## 2024-01-05 NOTE — PROGRESS NOTES
[facility]  Psychiatry Clinic  First Episode of Psychosis - Strengths Program  Clinician Contact & Progress Note   For Family Education Program     Patient: Amie Santiago (2000)     MRN: [id number]  Diagnosis(es): Psychosis, unspecified psychosis type (H) [F29]  Clinician: Jose Ventura  Service Type: 01801 Family Therapy without patient  Prolonged Care for this visit is not indicated.  Clinical work consists of family therapy.     Date:  12/01/23    Video- Visit Details   Type of service:  video visit  Video start time: 12:04pm   Video end time: 1:00pm  Originating location (patient location):  [facility]   Location- Home  Distant Site (provider location): HIPAA compliant location Off-site  Platform used for video visit:  Secure real time interactive audio and visual telecommunication system via Tapstream    People present:   Family without patient present   Mother - Lizy  Jose Ventura     Intervention:  Motivational Interviewing   Connect info and skills with personal goals  Promote hope and positive expectations  Explore pros and cons of change  Re-frame experiences in positive light    Educational Teaching Strategies   Review of written material/education  Relate information to client's experience  Ask questions to check comprehension  Break down information into small chunks  Adopt client's language     CBT   Reframe Cognitive Distortions (become aware of which distortions you are most vulnerable to, identifying and challenging our harmful automatic thoughts)  Behavioral Experiments (engage in a  what if  consideration, test existing beliefs  and/or help  test more adaptive beliefs, then modify unhelpful beliefs)  Pleasant Activity Scheduling (scheduling activities in the near future that you can look forward to, introduced more positivity and reduce negative thinking)  Recognizing the positive (Visualize, write, or discuss the best parts of the day to promote  positive thinking patterns)    Psychoeducational Topic(s) Addressed:  Treatment Planning  Problem Solving  Crisis Intervention    Techniques utilized:   [place] announced at beginning of session  Review of goal  Present new material  Problem-solving practice  Help client choose a home practice option  Summarize progress made in current session  Identified urgent concerns  Assessed caregiver/family burden  Elicit client/family feedback    Assessment & progress:     The focus of today's session was check in and problem solving. Assessed symptom presence and potential triggers for the patient.  Identified Irene's symptoms since last visit as irritability, impaired thinking, delusions, paranoid thoughts, disorganized behaviors and/or thinking, and anxiety. They reported current psychosocial stressors are related to continued recovery and aftercare. Family reports no urgent concerns at the time of session. No safety concerns reported or noted at the time of visit. Patient did not report any changes to medications.    The session started with check in with family reporting Irene is more stable and things are going well at the [facility] facility. Again, family reports that Irene is taking medications but is refusing other supports including therapy and resources. Family reports continued lack of insight, delusions, and paranoid thinking have strained family relationships. Family reports some repair to relationship strain but continued paranoia has been a barrier to connectedness especially with upcoming holiday plans. This writer and family discussed use of intentioned communication to support adapting holiday plans. Family reports hopes that relationship continues to repair and improve.    With regard to family dynamics, overall family seems as if they are able to interact in a cooperative, pleasant and calm manner.  Helpful clinical techniques utilized during today's appointment appeared to be motivational interviewing,  reflective listening, providing validation, and psychoeducation.  As of today's appt insight into [place] mental illness appears adequate.   Family would benefit from continued clinical intervention aimed at assisting them to implement helpful strategies at home and increase their understanding of psychosis.    Plan/Referrals:   Irene and her family are participating in coordinated speciality care via the Strengths Program within our clinic. Family did express interest in continuing to meet for family therapy and psychoeducation.    Will meet with family bi-weekly for evidence based family psychoeducation and support aimed at maximizing Irene's opportunity for recovery from psychosis.      Crisis Numbers:   Provided routinely in AVS     After hours:  [phone]    Next session: TBD    Treatment plan last completed on: 11/7/23  Next treatment plan update due by: 90 days  Treatment plan was reviewed and updated at this visit.   Acknowledged consent of current treatment plan was signed.    Reviewed goals to increase problem solving techniques, communication patterns, and learn about the patient's psychotic experiences with their family.  We discussed relevant progress made, and ways family can help with these goals.      Please EPIC message with any questions or concerns.    PROVIDER: JUAN DAVID Leal    Patient staffed in supervision with Christelle Bell who will sign the note.  Supervisor is Christelle Bell.

## 2024-01-11 NOTE — TELEPHONE ENCOUNTER
Writer called [facility] (MARCEL on file) to advise that we currently are only managing patient's olanzapine, but this can be addressed at patient's next visit. No further questions or concerns at this time.

## 2024-01-25 ENCOUNTER — OFFICE VISIT (OUTPATIENT)
Dept: PSYCHIATRY | Facility: CLINIC | Age: 24
End: 2024-01-25
Attending: PSYCHIATRY & NEUROLOGY
Payer: COMMERCIAL

## 2024-01-25 VITALS
HEART RATE: 108 BPM | DIASTOLIC BLOOD PRESSURE: 74 MMHG | WEIGHT: 139.2 LBS | BODY MASS INDEX: 25.46 KG/M2 | SYSTOLIC BLOOD PRESSURE: 115 MMHG

## 2024-01-25 DIAGNOSIS — E55.9 VITAMIN D DEFICIENCY: ICD-10-CM

## 2024-01-25 DIAGNOSIS — F29 PSYCHOSIS, UNSPECIFIED PSYCHOSIS TYPE (H): Primary | ICD-10-CM

## 2024-01-25 DIAGNOSIS — F12.90 CANNABIS USE DISORDER: ICD-10-CM

## 2024-01-25 PROCEDURE — G0463 HOSPITAL OUTPT CLINIC VISIT: HCPCS

## 2024-01-25 PROCEDURE — 99214 OFFICE O/P EST MOD 30 MIN: CPT | Mod: GC

## 2024-01-25 PROCEDURE — 90833 PSYTX W PT W E/M 30 MIN: CPT | Mod: HN

## 2024-01-25 RX ORDER — CHOLECALCIFEROL (VITAMIN D3) 50 MCG
1 TABLET ORAL DAILY
Qty: 30 TABLET | Refills: 6 | Status: SHIPPED | OUTPATIENT
Start: 2024-01-25 | End: 2024-08-23

## 2024-01-25 ASSESSMENT — PATIENT HEALTH QUESTIONNAIRE - PHQ9
SUM OF ALL RESPONSES TO PHQ QUESTIONS 1-9: 0
SUM OF ALL RESPONSES TO PHQ QUESTIONS 1-9: 0
10. IF YOU CHECKED OFF ANY PROBLEMS, HOW DIFFICULT HAVE THESE PROBLEMS MADE IT FOR YOU TO DO YOUR WORK, TAKE CARE OF THINGS AT HOME, OR GET ALONG WITH OTHER PEOPLE: NOT DIFFICULT AT ALL

## 2024-01-25 ASSESSMENT — PAIN SCALES - GENERAL: PAINLEVEL: NO PAIN (0)

## 2024-01-25 NOTE — PATIENT INSTRUCTIONS
Changes today:  --Start Vitamin D 25 mcg PO Qday.   Nabil Soares MD    **For crisis resources, please see the information at the end of this document**   Patient Education    Thank you for coming to the [facility] MENTAL HEALTH & ADDICTION [place] CLINIC.     Lab Testing:  If you had lab testing today and your results are reassuring or normal they will be mailed to you or sent through [employer] within 7 days. If the lab tests need quick action we will call you with the results. The phone number we will call with results is # [phone]. If this is not the best number please call our clinic and change the number.     Medication Refills:  If you need any refills please call your pharmacy and they will contact us. Our fax number for refills is [phone].   Three business days of notice are needed for general medication refill requests.   Five business days of notice are needed for controlled substance refill requests.   If you need to change to a different pharmacy, please contact the new pharmacy directly. The new pharmacy will help you get your medications transferred.     Contact Us:  Please call [phone] during business hours (8-5:00 M-F).   If you have medication related questions after clinic hours, or on the weekend, please call [phone].     Financial Assistance [phone]   Medical Records [phone]       MENTAL HEALTH CRISIS RESOURCES:  For a emergency help, please call 911 or go to the nearest Emergency Department.     Emergency Walk-In Options:   EmPATH Unit @ [facility] ([place]): [phone] - Specialized mental health emergency area designed to be calming  [facility] ([place]): [phone]  [facility] Acute Psychiatry Services ([place]): [phone]  [facility]): [phone]    [facility] Crisis Information:   [place]: [phone]  [place]: [phone]  Radha (DARRYN) - Adult: [phone]     Child: [phone]  [place] Maria  Adult: [phone]     Child: [phone]  [place]: [phone]  List of all [facility] resources:   https://[place].gov/dhs/people-we-serve/adults/health-care/mental-health/resources/crisis-contacts.jsp    National Crisis Information:   Crisis Text Line: Text  [place]  to 794766  Suicide & Crisis Lifeline: 988  National Suicide Prevention Lifeline: 6-921-463-TALK (1-696.287.8778)       For online chat options, visit https://suicidepreventionlifeline.org/chat/  Poison Control Center: 0-265-128-6938  Trans Lifeline: 1-776.428.6729 - Hotline for transgender people of all ages  The Jacoby Project: 6-646-031-0826 - Hotline for LGBT youth     For Non-Emergency Support:   Fast Tracker: Mental Health & Substance Use Disorder Resources -   https://www.Kabbagetrackermn.org/

## 2024-01-25 NOTE — NURSING NOTE
Chief Complaint   Patient presents with    Recheck Medication     Psychosis, unspecified psychosis type         Lashay De Jesusua on 1/25/2024 at 2:25 PM
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
## 2024-02-10 ASSESSMENT — SOCIAL DETERMINANTS OF HEALTH (SDOH): HOW OFTEN DO YOU GET TOGETHER WITH FRIENDS OR RELATIVES?: NEVER

## 2024-02-14 ENCOUNTER — OFFICE VISIT (OUTPATIENT)
Dept: FAMILY MEDICINE | Facility: CLINIC | Age: 24
End: 2024-02-14
Payer: COMMERCIAL

## 2024-02-14 VITALS
SYSTOLIC BLOOD PRESSURE: 105 MMHG | WEIGHT: 138 LBS | DIASTOLIC BLOOD PRESSURE: 73 MMHG | HEART RATE: 75 BPM | HEIGHT: 63 IN | OXYGEN SATURATION: 100 % | BODY MASS INDEX: 24.45 KG/M2 | RESPIRATION RATE: 16 BRPM

## 2024-02-14 DIAGNOSIS — Z11.3 ROUTINE SCREENING FOR STI (SEXUALLY TRANSMITTED INFECTION): ICD-10-CM

## 2024-02-14 DIAGNOSIS — Z86.59 HISTORY OF PSYCHOSIS: ICD-10-CM

## 2024-02-14 DIAGNOSIS — Z00.00 ROUTINE GENERAL MEDICAL EXAMINATION AT A HEALTH CARE FACILITY: Primary | ICD-10-CM

## 2024-02-14 LAB — HCG UR QL: NEGATIVE

## 2024-02-14 PROCEDURE — 87591 N.GONORRHOEAE DNA AMP PROB: CPT

## 2024-02-14 PROCEDURE — 90471 IMMUNIZATION ADMIN: CPT

## 2024-02-14 PROCEDURE — 90686 IIV4 VACC NO PRSV 0.5 ML IM: CPT

## 2024-02-14 PROCEDURE — 87389 HIV-1 AG W/HIV-1&-2 AB AG IA: CPT

## 2024-02-14 PROCEDURE — 90480 ADMN SARSCOV2 VAC 1/ONLY CMP: CPT

## 2024-02-14 PROCEDURE — 86803 HEPATITIS C AB TEST: CPT

## 2024-02-14 PROCEDURE — 91320 SARSCV2 VAC 30MCG TRS-SUC IM: CPT

## 2024-02-14 PROCEDURE — 99395 PREV VISIT EST AGE 18-39: CPT | Mod: 25

## 2024-02-14 PROCEDURE — 86780 TREPONEMA PALLIDUM: CPT

## 2024-02-14 PROCEDURE — 87340 HEPATITIS B SURFACE AG IA: CPT

## 2024-02-14 PROCEDURE — 81025 URINE PREGNANCY TEST: CPT

## 2024-02-14 PROCEDURE — 36415 COLL VENOUS BLD VENIPUNCTURE: CPT

## 2024-02-14 PROCEDURE — 87491 CHLMYD TRACH DNA AMP PROBE: CPT

## 2024-02-14 ASSESSMENT — PAIN SCALES - GENERAL: PAINLEVEL: NO PAIN (0)

## 2024-02-14 NOTE — PATIENT INSTRUCTIONS
Preventive Care Advice   This is general advice given by our system to help you stay healthy. However, your care team may have specific advice just for you. Please talk to your care team about your preventive care needs.  Nutrition  Eat 5 or more servings of fruits and vegetables each day.  Try wheat bread, brown rice and whole grain pasta (instead of white bread, rice, and pasta).  Get enough calcium and vitamin D. Check the label on foods and aim for 100% of the RDA (recommended daily allowance).  Lifestyle  Exercise at least 150 minutes each week  (30 minutes a day, 5 days a week).  Do muscle strengthening activities 2 days a week. These help control your weight and prevent disease.  No smoking.  Wear sunscreen to prevent skin cancer.  Have a dental exam and cleaning every 6 months.  Yearly exams  See your health care team every year to talk about:  Any changes in your health.  Any medicines your care team has prescribed.  Preventive care, family planning, and ways to prevent chronic diseases.  Shots (vaccines)   HPV shots (up to age 26), if you've never had them before.  Hepatitis B shots (up to age 59), if you've never had them before.  COVID-19 shot: Get this shot when it's due.  Flu shot: Get a flu shot every year.  Tetanus shot: Get a tetanus shot every 10 years.  Pneumococcal, hepatitis A, and RSV shots: Ask your care team if you need these based on your risk.  Shingles shot (for age 50 and up)  General health tests  Diabetes screening:  Starting at age 35, Get screened for diabetes at least every 3 years.  If you are younger than age 35, ask your care team if you should be screened for diabetes.  Cholesterol test: At age 39, start having a cholesterol test every 5 years, or more often if advised.  Bone density scan (DEXA): At age 50, ask your care team if you should have this scan for osteoporosis (brittle bones).  Hepatitis C: Get tested at least once in your life.  STIs (sexually transmitted  infections)  Before age 24: Ask your care team if you should be screened for STIs.  After age 24: Get screened for STIs if you're at risk. You are at risk for STIs (including HIV) if:  You are sexually active with more than one person.  You don't use condoms every time.  You or a partner was diagnosed with a sexually transmitted infection.  If you are at risk for HIV, ask about PrEP medicine to prevent HIV.  Get tested for HIV at least once in your life, whether you are at risk for HIV or not.  Cancer screening tests  Cervical cancer screening: If you have a cervix, begin getting regular cervical cancer screening tests starting at age 21.  Breast cancer scan (mammogram): If you've ever had breasts, begin having regular mammograms starting at age 40. This is a scan to check for breast cancer.  Colon cancer screening: It is important to start screening for colon cancer at age 45.  Have a colonoscopy test every 10 years (or more often if you're at risk) Or, ask your provider about stool tests like a FIT test every year or Cologuard test every 3 years.  To learn more about your testing options, visit:   https://www.[employer]/661396.pdf.  For help making a decision, visit:   https://bit.ly/sn30937.  Prostate cancer screening test: If you have a prostate, ask your care team if a prostate cancer screening test (PSA) at age 55 is right for you.  Lung cancer screening: If you are a current or former smoker ages 50 to 80, ask your care team if ongoing lung cancer screenings are right for you.  For informational purposes only. Not to replace the advice of your health care provider. Copyright   2023 [employer]. All rights reserved. Clinically reviewed by the [facility] Transitions Program. [place] SPARK 213127 - REV 01/24.    Relationships for Good Health  Relationships are important for our health and happiness. Social isolation, loneliness and lack of support are bad for your health. Studies show that  loneliness can harm health and limit your life span as much as high blood pressure and smoking.   Take some time to reflect on your relationships. Then answer these questions:  Are there people in your life that cause you stress or drain your energy? What can you do to set limits?  ________________________________________________________________________________________________________________________________________________________________________________________________________________________________________________________________________________________________________________________________________________  Who do you enjoy spending time with? Who can you go to for support?  ________________________________________________________________________________________________________________________________________________________________________________________________________________________________________________________________________________________________________________________________________________  What can you do to improve your relationships with others?  __________________________________________________________________________________________________________________________________________________________________________________________________________________  ______________________________________________________________________________________________________________________________  What do you like most about your relationships with others?  ________________________________________________________________________________________________________________________________________________________________________________________________________________________________________________________________________________________________________________________________________________  My goal: ______________________________________________________________________  I will  ______________________________________________________________________________________________________________________________________________________________________________________________    For informational purposes only. Not to replace the advice of your health care provider. Copyright   2018 [facility]. All rights reserved. Clinically reviewed by [facility]  Team. [employer] 672103 - Rev 04/21.    Safer Sex: Care Instructions  Overview  Safer sex is a way to reduce your risk of getting a sexually transmitted infection (STI). It can also help prevent pregnancy.  Several products can help you practice safer sex and reduce your chance of STIs. One of the best is a condom. There are internal and external condoms. You can use a special rubber sheet (dental dam) for protection during oral sex. Disposable gloves can keep your hands from touching blood, semen, or other body fluids that can carry infections.  Remember that birth control methods such as diaphragms, IUDs, foams, and birth control pills do not stop you from getting STIs.  Follow-up care is a key part of your treatment and safety. Be sure to make and go to all appointments, and call your doctor if you are having problems. It's also a good idea to know your test results and keep a list of the medicines you take.  How can you care for yourself at home?  Think about getting vaccinated to help prevent hepatitis A, hepatitis B, and human papillomavirus (HPV). They can be spread through sex.  Use a condom every time you have sex. Use an external condom, which goes on the penis. Or use an internal condom, which goes into the vagina or anus.  Make sure you use the right size external condom. A condom that's too small can break easily. A condom that's too big can slip off during sex.  Use a new condom each time you have sex. Be careful not to poke a hole in the condom when you open the wrapper.  Don't use an internal condom and an external  "condom at the same time.  Never use petroleum jelly (such as Vaseline), grease, hand lotion, baby oil, or anything with oil in it. These products can make holes in the condom.  After intercourse, hold the edge of the condom as you remove it. This will help keep semen from spilling out of the condom.  Do not have sex with anyone who has symptoms of an STI, such as sores on the genitals or mouth.  Do not drink a lot of alcohol or use drugs before sex.  Limit your sex partners. Sex with one partner who has sex only with you can reduce your risk of getting an STI.  Don't share sex toys. But if you do share them, use a condom and clean the sex toys between each use.  Talk to your partner(s) before you have sex. Talk about what you feel comfortable with and whether you have any boundaries with sex. And find out if your partner(s) may be at risk for any STI. Keep in mind that a person may be able to spread an STI even if they do not have symptoms. You and your partner(s) may want to get tested for STIs.  Where can you learn more?  Go to https://www.[employer].net/patiented  Enter B608 in the search box to learn more about \"Safer Sex: Care Instructions.\"  Current as of: April 19, 2023               Content Version: 13.8    6819-6060 [employer].   Care instructions adapted under license by your healthcare professional. If you have questions about a medical condition or this instruction, always ask your healthcare professional. Healthwise, [employer] disclaims any warranty or liability for your use of this information.      "

## 2024-02-14 NOTE — PROGRESS NOTES
Preventive Care Visit  [facility]  LIVIA Russell CNP, Family Medicine  Feb 14, 2024      Assessment & Plan     Routine general medical examination at a health care facility  - Health maintenance and lifestyle reviewed. Updated medical and family history.  - Follow up in one year or sooner as needed.   - INFLUENZA VACCINE IM > 6 MONTHS VALENT IIV4 (AFLURIA/FLUZONE)  - COVID-19 12+ (2023-24) (PFIZER)  - PRIMARY CARE FOLLOW-UP SCHEDULING    Routine screening for STI (sexually transmitted infection)  - Discussed condom use for STI prevention.  - HIV Antigen Antibody Combo  - Hepatitis C antibody  - Hepatitis B surface antigen  - Treponema Abs w Reflex to RPR and Titer  - Chlamydia & Gonorrhea by PCR, [facility] - Clinic Collect  - HCG qualitative urine    History of psychosis  - Noted.  Patient has had several ER visits in the past year. Following with psychiatry regularly and is taking olanzapine daily.   - Living in a mental health group home for the past 3 to 4 months.  Sees a therapist regularly.  Reports mental health is stable.    Counseling  Appropriate preventive services were discussed with this patient, including applicable screening as appropriate for fall prevention, nutrition, physical activity, Tobacco-use cessation, weight loss and cognition.  Checklist reviewing preventive services available has been given to the patient.  Reviewed patient's diet, addressing concerns and/or questions.   Patient is at risk for social isolation and has been provided with information about the benefit of social connection.     Return to care in 1 year or sooner as needed.    Filippo Bonilla is a 23 year old, presenting for the following:  Physical        2/14/2024     3:20 PM   Additional Questions   Roomed by DM        Health Care Directive  Patient does not have a Health Care Directive or Living Will: Discussed advance care planning with patient; information given to patient to  review.    HPI    Patient presents for a annual physical exam.  Denies current concerns.  Desires STI testing today.  Patient's last menstrual period was 01/15/2024 (approximate).  Periods regular, no heavy bleeding or cramping.  Contraception: condoms. Patient is satisfied with current method of contraception.   Sexually active, does desire STI testing today.    Living in a mental health group home for the past 3 to 4 months, this is going well.  Follows with psychiatry and therapist regularly.  Mental health is stable.  Sleeping well.  No concerns with depression or anxiety.  No alcohol use, marijuana use several times per week.        2/10/2024   General Health   How would you rate your overall physical health? Excellent   Feel stress (tense, anxious, or unable to sleep) Not at all         2/10/2024   Nutrition   Three or more servings of calcium each day? (!) I DON'T KNOW   Diet: Regular (no restrictions)   How many servings of fruit and vegetables per day? (!) 2-3   How many sweetened beverages each day? 0-1         5/9/2023   Exercise   Frequency of exercise: 4-5 days/week         2/10/2024   Social Factors   Frequency of gathering with friends or relatives Never   Worry food won't last until get money to buy more No   Food not last or not have enough money for food? No   Do you have housing?  No   Are you worried about losing your housing? No   Lack of transportation? No   Unable to get utilities (heat,electricity)? No   Want help with housing or utility concern? No   (!) HOUSING CONCERN PRESENT(!) SOCIAL CONNECTIONS CONCERN      2/10/2024   Dental   Dentist two times every year? Yes         2/10/2024   TB Screening   Were you born outside of [place]?  (!) YES       Today's PHQ-9 Score:       1/25/2024     2:18 PM   PHQ-9 SCORE   PHQ-9 Total Score MyChart 0   PHQ-9 Total Score 0           2/10/2024   Substance Use   Alcohol more than 3/day or more than 7/wk No   Do you use any other substances recreationally? No  "    Social History     Tobacco Use    Smoking status: Former     Types: Vaping Device     Passive exposure: Never    Smokeless tobacco: Never   Vaping Use    Vaping Use: Former   Substance Use Topics    Alcohol use: Never    Drug use: Yes     Types: Marijuana     Comment: several times per week         2/10/2024   STI Screening   New sexual partner(s) since last STI/HIV test? (!) YES      History of abnormal Pap smear: NO - age 21-29 PAP every 3 years recommended        5/9/2023     1:51 PM   PAP / HPV   PAP Negative for Intraepithelial Lesion or Malignancy (NILM)            2/10/2024   Contraception/Family Planning   Questions about contraception or family planning (!) YES      Reviewed and updated as needed this visit by Provider   Tobacco  Allergies  Meds  Problems  Med Hx  Surg Hx  Fam Hx         Review of Systems  Constitutional, HEENT, cardiovascular, pulmonary, gi and gu systems are negative, except as otherwise noted.     Objective    Exam  /73 (BP Location: Right arm)   Pulse 75   Resp 16   Ht 1.588 m (5' 2.5\")   Wt 62.6 kg (138 lb)   LMP 01/15/2024 (Approximate)   SpO2 100%   BMI 24.84 kg/m     Estimated body mass index is 24.84 kg/m  as calculated from the following:    Height as of this encounter: 1.588 m (5' 2.5\").    Weight as of this encounter: 62.6 kg (138 lb).    Physical Exam  GENERAL: alert and no distress  EYES: Eyes grossly normal to inspection, PERRL and conjunctivae and sclerae normal  HENT: ear canals and TM's normal, nose and mouth without ulcers or lesions  NECK: no adenopathy, no asymmetry, masses, or scars  RESP: lungs clear to auscultation - no rales, rhonchi or wheezes  BREAST: normal without masses, tenderness or nipple discharge and no palpable axillary masses or adenopathy  CV: regular rate and rhythm, normal S1 S2, no S3 or S4, no murmur, click or rub, no peripheral edema  ABDOMEN: soft, nontender, no hepatosplenomegaly, no masses and bowel sounds normal  MS: no " gross musculoskeletal defects noted, no edema  SKIN: no suspicious lesions or rashes  NEURO: Normal strength and tone, mentation intact and speech normal  PSYCH: mentation appears normal, affect normal/bright    Signed Electronically by: LIVIA Russell CNP

## 2024-02-15 LAB
C TRACH DNA SPEC QL PROBE+SIG AMP: NEGATIVE
HBV SURFACE AG SERPL QL IA: NONREACTIVE
HCV AB SERPL QL IA: NONREACTIVE
HIV 1+2 AB+HIV1 P24 AG SERPL QL IA: NONREACTIVE
N GONORRHOEA DNA SPEC QL NAA+PROBE: NEGATIVE
T PALLIDUM AB SER QL: NONREACTIVE

## 2024-03-07 ENCOUNTER — TELEPHONE (OUTPATIENT)
Dept: PSYCHIATRY | Facility: CLINIC | Age: 24
End: 2024-03-07
Payer: COMMERCIAL

## 2024-03-07 ENCOUNTER — OFFICE VISIT (OUTPATIENT)
Dept: PSYCHIATRY | Facility: CLINIC | Age: 24
End: 2024-03-07
Attending: PSYCHIATRY & NEUROLOGY
Payer: COMMERCIAL

## 2024-03-07 VITALS
BODY MASS INDEX: 25.52 KG/M2 | HEART RATE: 97 BPM | WEIGHT: 141.8 LBS | DIASTOLIC BLOOD PRESSURE: 67 MMHG | SYSTOLIC BLOOD PRESSURE: 98 MMHG

## 2024-03-07 DIAGNOSIS — F29 PSYCHOSIS, UNSPECIFIED PSYCHOSIS TYPE (H): Primary | ICD-10-CM

## 2024-03-07 PROCEDURE — G0463 HOSPITAL OUTPT CLINIC VISIT: HCPCS

## 2024-03-07 PROCEDURE — 90833 PSYTX W PT W E/M 30 MIN: CPT | Mod: HN

## 2024-03-07 PROCEDURE — 99214 OFFICE O/P EST MOD 30 MIN: CPT | Mod: GC

## 2024-03-07 RX ORDER — PALIPERIDONE 3 MG/1
3 TABLET, EXTENDED RELEASE ORAL AT BEDTIME
Qty: 30 TABLET | Refills: 1 | Status: SHIPPED | OUTPATIENT
Start: 2024-03-07 | End: 2024-04-11

## 2024-03-07 RX ORDER — OLANZAPINE 7.5 MG/1
7.5 TABLET, FILM COATED ORAL 2 TIMES DAILY
Qty: 60 TABLET | Refills: 1 | Status: SHIPPED | OUTPATIENT
Start: 2024-03-07 | End: 2024-04-11

## 2024-03-07 ASSESSMENT — PAIN SCALES - GENERAL: PAINLEVEL: NO PAIN (0)

## 2024-03-07 NOTE — PATIENT INSTRUCTIONS
Medication changes made today:  -Decrease Olanzapine to 7.5 mg PO BID   -Start Invega 3 mg PO at bed time  -Goal is to cross-titrate to Ivega trinza. JAY.  Will send Rx to [place] and follow up in 4 weeks.   -Expect a call from our Nurse partners in about a week to check in for medication changes..Please, also let us know if you notice side effects (discussed) or if you have questions.     Best,  Nabil Soares MD      **For crisis resources, please see the information at the end of this document**   Patient Education    Thank you for coming to the [facility].     Lab Testing:  If you had lab testing today and your results are reassuring or normal they will be mailed to you or sent through [facility] within 7 days. If the lab tests need quick action we will call you with the results. The phone number we will call with results is # [phone]. If this is not the best number please call our clinic and change the number.     Medication Refills:  If you need any refills please call your pharmacy and they will contact us. Our fax number for refills is [phone].   Three business days of notice are needed for general medication refill requests.   Five business days of notice are needed for controlled substance refill requests.   If you need to change to a different pharmacy, please contact the new pharmacy directly. The new pharmacy will help you get your medications transferred.     Contact Us:  Please call [phone] during business hours (8-5:00 M-F).   If you have medication related questions after clinic hours, or on the weekend, please call [phone].     Financial Assistance [phone]   Medical Records [phone]       MENTAL HEALTH CRISIS RESOURCES:  For a emergency help, please call 911 or go to the nearest Emergency Department.     Emergency Walk-In Options:   [facility] [place] [facility] [phone] - Specialized mental health emergency  area designed to be [facility] ([place]): [phone]  [facility] ([place]): [phone]  [facility] ([place]): [phone]    [facility] Crisis Information:   [place]: [phone]  [place]: [phone]  Radha (DARRYN) - Adult: [phone]     Child: [phone]  [place] - Adult: [phone]     Child: [phone]  [place]: [phone]  List of all [facility] resources:   https://[place].gov/dhs/people-we-serve/adults/health-care/mental-health/resources/crisis-contacts.jsp    National Crisis Information:   Crisis Text Line: Text  [place]  to 512102  Suicide & Crisis Lifeline: 988  National Suicide Prevention Lifeline: 7-146-289-TALK (1-479.598.9365)       For online chat options, visit https://suicidepreventionlifeline.org/chat/  Poison Control Center: 1-255.586.8511  Trans Lifeline: 1-379.113.8922 - Hotline for transgender people of all ages  The [employer]: 0-934-069-7251 - Hotline for LGBT youth     For Non-Emergency Support:   Fast Tracker: Mental Health & Substance Use Disorder Resources -   https://www.Public[employer]track[employer]Heart[employer].org/
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
## 2024-03-07 NOTE — NURSING NOTE
Chief Complaint   Patient presents with    Recheck Medication     Psychosis, unspecified psychosis type     - Yogesh Cali, Visit Facilitator

## 2024-03-07 NOTE — TELEPHONE ENCOUNTER
Faxed Department of Human services request for medical opinion form to Antonieta Quinonez  at [facility] per provider request.

## 2024-03-08 ENCOUNTER — TELEPHONE (OUTPATIENT)
Dept: PSYCHIATRY | Facility: CLINIC | Age: 24
End: 2024-03-08
Payer: COMMERCIAL

## 2024-03-08 NOTE — TELEPHONE ENCOUNTER
Approval Details    Authorization number: [id number]   Authorized from March 8, 2024 to March 8, 2025   Information received electronically from payer

## 2024-03-14 NOTE — TELEPHONE ENCOUNTER
Writer re-faxed forms to Antonieta Quinonez at [phone]. Writer called Antonieta and she confirmed receipt of forms.

## 2024-03-14 NOTE — TELEPHONE ENCOUNTER
Antonieta calling clinic about status of form, says they have not yet received it. Writer confirmed fax date by clinic and fax # as [phone]. Antonieta requesting form to be re-faxed at number provided.

## 2024-03-15 ENCOUNTER — TELEPHONE (OUTPATIENT)
Dept: PSYCHIATRY | Facility: CLINIC | Age: 24
End: 2024-03-15
Payer: COMMERCIAL

## 2024-03-15 NOTE — TELEPHONE ENCOUNTER
Writer tried calling Irene x2 to do a phone check-in. Phone rang x3 and then call was discontinued each time. Will send ZikBit message. If no response, will attempt to reach patient by phone again on Monday, 3/18.    Cecelia Munoz RN on 3/15/2024 at 1:03 PM

## 2024-03-15 NOTE — TELEPHONE ENCOUNTER
Met with Amie today and we are making med changes.   Forms filled out today and coordinated with [facility] (Yelitza Holcomb'bassam here) to fax to Radha Perez.   Could someone please give Amie a call in ~7 days to check in for med tolerance/side effects/psychosis/mood/safety?     Please let me know if you have questions.   SAMAN, I will be out next week on vacation, Dr. Sarita Chery will be covering my inbox. Please let him or POD know if there are any pressing issues needing immediate attention.     Best.   Nabil Soares.

## 2024-03-26 ENCOUNTER — ANCILLARY PROCEDURE (OUTPATIENT)
Dept: GENERAL RADIOLOGY | Facility: CLINIC | Age: 24
End: 2024-03-26
Attending: PHYSICIAN ASSISTANT
Payer: COMMERCIAL

## 2024-03-26 ENCOUNTER — OFFICE VISIT (OUTPATIENT)
Dept: FAMILY MEDICINE | Facility: CLINIC | Age: 24
End: 2024-03-26
Payer: COMMERCIAL

## 2024-03-26 ENCOUNTER — MEDICAL CORRESPONDENCE (OUTPATIENT)
Dept: HEALTH INFORMATION MANAGEMENT | Facility: CLINIC | Age: 24
End: 2024-03-26

## 2024-03-26 ENCOUNTER — TELEPHONE (OUTPATIENT)
Dept: FAMILY MEDICINE | Facility: CLINIC | Age: 24
End: 2024-03-26

## 2024-03-26 VITALS
RESPIRATION RATE: 16 BRPM | HEIGHT: 63 IN | TEMPERATURE: 97.3 F | HEART RATE: 83 BPM | DIASTOLIC BLOOD PRESSURE: 70 MMHG | OXYGEN SATURATION: 99 % | SYSTOLIC BLOOD PRESSURE: 100 MMHG | BODY MASS INDEX: 24.98 KG/M2 | WEIGHT: 141 LBS

## 2024-03-26 DIAGNOSIS — Z78.9 LIVING IN ASSISTED LIVING: ICD-10-CM

## 2024-03-26 DIAGNOSIS — H91.92 HEARING DEFICIT, LEFT: ICD-10-CM

## 2024-03-26 DIAGNOSIS — Z86.59 HISTORY OF PSYCHOSIS: Primary | ICD-10-CM

## 2024-03-26 DIAGNOSIS — Z79.899 LONG TERM CURRENT USE OF ANTIPSYCHOTIC MEDICATION: ICD-10-CM

## 2024-03-26 DIAGNOSIS — N30.00 ACUTE CYSTITIS WITHOUT HEMATURIA: ICD-10-CM

## 2024-03-26 DIAGNOSIS — Z86.59 HISTORY OF PSYCHOSIS: ICD-10-CM

## 2024-03-26 LAB
ALBUMIN SERPL BCG-MCNC: 4.5 G/DL (ref 3.5–5.2)
ALBUMIN UR-MCNC: NEGATIVE MG/DL
ALP SERPL-CCNC: 82 U/L (ref 40–150)
ALT SERPL W P-5'-P-CCNC: 11 U/L (ref 0–50)
APPEARANCE UR: ABNORMAL
AST SERPL W P-5'-P-CCNC: 23 U/L (ref 0–45)
BACTERIA #/AREA URNS HPF: ABNORMAL /HPF
BILIRUB DIRECT SERPL-MCNC: <0.2 MG/DL (ref 0–0.3)
BILIRUB SERPL-MCNC: 0.3 MG/DL
BILIRUB UR QL STRIP: NEGATIVE
CHOLEST SERPL-MCNC: 151 MG/DL
COLOR UR AUTO: YELLOW
ERYTHROCYTE [DISTWIDTH] IN BLOOD BY AUTOMATED COUNT: 12.3 % (ref 10–15)
FASTING STATUS PATIENT QL REPORTED: YES
GLUCOSE UR STRIP-MCNC: NEGATIVE MG/DL
HBA1C MFR BLD: 5.1 % (ref 0–5.6)
HCG UR QL: NEGATIVE
HCT VFR BLD AUTO: 39.2 % (ref 35–47)
HDLC SERPL-MCNC: 51 MG/DL
HGB BLD-MCNC: 13.1 G/DL (ref 11.7–15.7)
HGB UR QL STRIP: ABNORMAL
KETONES UR STRIP-MCNC: NEGATIVE MG/DL
LDLC SERPL CALC-MCNC: 88 MG/DL
LEUKOCYTE ESTERASE UR QL STRIP: ABNORMAL
MCH RBC QN AUTO: 29.1 PG (ref 26.5–33)
MCHC RBC AUTO-ENTMCNC: 33.4 G/DL (ref 31.5–36.5)
MCV RBC AUTO: 87 FL (ref 78–100)
MUCOUS THREADS #/AREA URNS LPF: PRESENT /LPF
NITRATE UR QL: NEGATIVE
NONHDLC SERPL-MCNC: 100 MG/DL
PH UR STRIP: 6 [PH] (ref 5–7)
PLATELET # BLD AUTO: 237 10E3/UL (ref 150–450)
PROT SERPL-MCNC: 7.8 G/DL (ref 6.4–8.3)
RBC # BLD AUTO: 4.5 10E6/UL (ref 3.8–5.2)
RBC #/AREA URNS AUTO: ABNORMAL /HPF
SP GR UR STRIP: 1.02 (ref 1–1.03)
SQUAMOUS #/AREA URNS AUTO: ABNORMAL /LPF
TRIGL SERPL-MCNC: 60 MG/DL
UROBILINOGEN UR STRIP-ACNC: 0.2 E.U./DL
WBC # BLD AUTO: 5.9 10E3/UL (ref 4–11)
WBC #/AREA URNS AUTO: ABNORMAL /HPF

## 2024-03-26 PROCEDURE — 80076 HEPATIC FUNCTION PANEL: CPT | Performed by: PHYSICIAN ASSISTANT

## 2024-03-26 PROCEDURE — 81001 URINALYSIS AUTO W/SCOPE: CPT | Performed by: PHYSICIAN ASSISTANT

## 2024-03-26 PROCEDURE — 36415 COLL VENOUS BLD VENIPUNCTURE: CPT | Performed by: PHYSICIAN ASSISTANT

## 2024-03-26 PROCEDURE — 87086 URINE CULTURE/COLONY COUNT: CPT | Performed by: PHYSICIAN ASSISTANT

## 2024-03-26 PROCEDURE — 71046 X-RAY EXAM CHEST 2 VIEWS: CPT | Mod: TC | Performed by: RADIOLOGY

## 2024-03-26 PROCEDURE — 80061 LIPID PANEL: CPT | Performed by: PHYSICIAN ASSISTANT

## 2024-03-26 PROCEDURE — 85027 COMPLETE CBC AUTOMATED: CPT | Performed by: PHYSICIAN ASSISTANT

## 2024-03-26 PROCEDURE — 83036 HEMOGLOBIN GLYCOSYLATED A1C: CPT | Performed by: PHYSICIAN ASSISTANT

## 2024-03-26 PROCEDURE — 99215 OFFICE O/P EST HI 40 MIN: CPT | Performed by: PHYSICIAN ASSISTANT

## 2024-03-26 PROCEDURE — 81025 URINE PREGNANCY TEST: CPT | Performed by: PHYSICIAN ASSISTANT

## 2024-03-26 RX ORDER — NITROFURANTOIN 25; 75 MG/1; MG/1
100 CAPSULE ORAL 2 TIMES DAILY
Qty: 14 CAPSULE | Refills: 0 | Status: SHIPPED | OUTPATIENT
Start: 2024-03-26 | End: 2024-04-02

## 2024-03-26 SDOH — HEALTH STABILITY: PHYSICAL HEALTH: ON AVERAGE, HOW MANY MINUTES DO YOU ENGAGE IN EXERCISE AT THIS LEVEL?: 30 MIN

## 2024-03-26 SDOH — HEALTH STABILITY: PHYSICAL HEALTH: ON AVERAGE, HOW MANY DAYS PER WEEK DO YOU ENGAGE IN MODERATE TO STRENUOUS EXERCISE (LIKE A BRISK WALK)?: 4 DAYS

## 2024-03-26 ASSESSMENT — PAIN SCALES - GENERAL: PAINLEVEL: NO PAIN (0)

## 2024-03-26 ASSESSMENT — SOCIAL DETERMINANTS OF HEALTH (SDOH): HOW OFTEN DO YOU GET TOGETHER WITH FRIENDS OR RELATIVES?: ONCE A WEEK

## 2024-03-26 NOTE — TELEPHONE ENCOUNTER
This writer attempted to contact patient on 03/26/24      Reason for call relay message below and left message.      If patient calls back:   Please relay message below.        Kitty Mckeon RN      ----- Message from Bailee Linder PA-C sent at 3/26/2024  3:38 PM CDT -----    Please call the patient with these results:    Patient does not check My[facility], lives in a group home.     Urinalysis shows signs of UTI. Please start Nitrofurantoin 100 mg twice a day for 7 days to treat this infection. Prescription was sent o your pharmacy.     Bailee Linder PAC

## 2024-03-26 NOTE — LETTER
March 29, 2024      Irene Santiago  [address]  [facility] [address]        Dear ,    The urinalysis shows signs of a urinary tract infection. Please start Nitrofurantoin 100 mg, take twice a day for 7 days, to treat this infection. The prescription was sent to [place] pharmacy.      If you have any questions or concerns, please call the clinic at the number listed above.       Sincerely,        Bailee Linder PA-C         Resulted Orders   Hemoglobin A1c   Result Value Ref Range    Hemoglobin A1C 5.1 0.0 - 5.6 %      Comment:      Normal <5.7%   Prediabetes 5.7-6.4%    Diabetes 6.5% or higher     Note: Adopted from ADA consensus guidelines.   CBC with platelets   Result Value Ref Range    WBC Count 5.9 4.0 - 11.0 10e3/uL    RBC Count 4.50 3.80 - 5.20 10e6/uL    Hemoglobin 13.1 11.7 - 15.7 g/dL    Hematocrit 39.2 35.0 - 47.0 %    MCV 87 78 - 100 fL    MCH 29.1 26.5 - 33.0 pg    MCHC 33.4 31.5 - 36.5 g/dL    RDW 12.3 10.0 - 15.0 %    Platelet Count 237 150 - 450 10e3/uL   UA with Microscopic - lab collect   Result Value Ref Range    Color Urine Yellow Colorless, Straw, Light Yellow, Yellow    Appearance Urine Cloudy (A) Clear    Glucose Urine Negative Negative mg/dL    Bilirubin Urine Negative Negative    Ketones Urine Negative Negative mg/dL    Specific Gravity Urine 1.025 1.003 - 1.035    Blood Urine Trace (A) Negative    pH Urine 6.0 5.0 - 7.0    Protein Albumin Urine Negative Negative mg/dL    Urobilinogen Urine 0.2 0.2, 1.0 E.U./dL    Nitrite Urine Negative Negative    Leukocyte Esterase Urine Trace (A) Negative   Urine Microscopic Exam   Result Value Ref Range    Bacteria Urine Moderate (A) None Seen /HPF    RBC Urine 2-5 (A) 0-2 /HPF /HPF    WBC Urine 5-10 (A) 0-5 /HPF /HPF    Squamous Epithelials Urine Many (A) None Seen /LPF    Mucus Urine Present (A) None Seen /LPF

## 2024-03-26 NOTE — PROGRESS NOTES
"  Assessment & Plan   Problem List Items Addressed This Visit          Nervous and Auditory    Hearing deficit, left     Other Visit Diagnoses       History of psychosis    -  Primary    Relevant Orders    Lipid panel reflex to direct LDL Fasting    Hemoglobin A1c    Hepatic panel (Albumin, ALT, AST, Bili, Alk Phos, TP)    CBC with platelets (Completed)    UA with Microscopic - lab collect    XR Chest 2 Views (Completed)    Urine Microscopic Exam    Living in assisted living        Relevant Orders    Lipid panel reflex to direct LDL Fasting    Hemoglobin A1c    Hepatic panel (Albumin, ALT, AST, Bili, Alk Phos, TP)    CBC with platelets (Completed)    UA with Microscopic - lab collect    XR Chest 2 Views (Completed)    Urine Microscopic Exam    Long term current use of antipsychotic medication        Relevant Orders    Lipid panel reflex to direct LDL Fasting    Hemoglobin A1c    Hepatic panel (Albumin, ALT, AST, Bili, Alk Phos, TP)    CBC with platelets (Completed)    UA with Microscopic - lab collect    Urine Microscopic Exam         All requested labs, screenings were ordered and performed.  Results are pending  Forms have been filled out and will be faxed once labs are resulted.         45 minutes spent by me on the date of the encounter doing chart review, history and exam, documentation and further activities per the note     BMI  Estimated body mass index is 25.3 kg/m  as calculated from the following:    Height as of this encounter: 1.59 m (5' 2.6\").    Weight as of this encounter: 64 kg (141 lb).       Counseling  Appropriate preventive services were discussed with this patient, including applicable screening as appropriate for fall prevention, nutrition, physical activity, Tobacco-use cessation, weight loss and cognition.  Checklist reviewing preventive services available has been given to the patient.  Reviewed patient's diet, addressing concerns and/or questions.           Filippo   Irene is a 23 year " "old, presenting for the following health issues:  Forms        3/26/2024     8:26 AM   Additional Questions   Roomed by Sabine   Accompanied by Paty   Failed to redirect to the Timeline version of the REVFS SmartLink.      3/26/2024     8:26 AM   Patient Reported Additional Medications   Patient reports taking the following new medications none     HPI     Patient is here with her  for an exam and tests/labs that need to be in oredr to fill out assisted living forms and get admitted.   Form filled out for housing. Patient needs an exam, labs and multiple forms to be filled out for a new group home housing.   Patient has mental health condition that requires assisted living. Currently patient is being treated for psychosis with antipsychotic medication. She sees a psych specialist through [facility].         Review of Systems        Objective    /70 (BP Location: Left arm, Patient Position: Sitting, Cuff Size: Adult Regular)   Pulse 83   Temp 97.3  F (36.3  C) (Oral)   Resp 16   Ht 1.59 m (5' 2.6\")   Wt 64 kg (141 lb)   LMP 02/17/2024 (Exact Date)   SpO2 99%   BMI 25.30 kg/m    Body mass index is 25.3 kg/m .  Physical Exam               Signed Electronically by: Bailee Linder PA-C    "

## 2024-03-26 NOTE — RESULT ENCOUNTER NOTE
Please call the patient with these results:    Patient does not check Mycart, lives in a group home.     Urinalysis shows signs of UTI. Please start Nitrofurantoin 100 mg twice a day for 7 days to treat this infection. Prescription was sent o your pharmacy.     Bailee Linder PAC

## 2024-03-27 NOTE — PROGRESS NOTES
Addendum to the main note for 3/7/24 encounter:     Start-end individual psychotherapy documentation portion should reflect the following:     Psychiatry Individual Psychotherapy Note   Psychotherapy start time - 1500  Psychotherapy end time - 1520  Date treatment plan last reviewed with patient - 03/07/24    Patient was staffed in clinic with Dr. Peters who already signed the note.  Supervisor is Dr. Gates.      Nabil Soares MD.   Psychiatry Resident  [facility]

## 2024-03-27 NOTE — TELEPHONE ENCOUNTER
This writer attempted to contact patient on 03/27/24. (2nd attempt)      Reason for call relay message below and left message.      If patient calls back:   Please relay message below.        Kitty Mckeon RN

## 2024-03-28 LAB — BACTERIA UR CULT: NORMAL

## 2024-03-28 NOTE — TELEPHONE ENCOUNTER
This writer attempted to contact patient on 03/27/24. (3rd attempt)        Reason for call relay message below and left message.        If patient calls back:              Please relay message below.           Jyotsna Stern RN

## 2024-03-29 NOTE — TELEPHONE ENCOUNTER
RN team has attempted to contact patient 3 times.     Letter routed to TC team to please print and mail to patient's home. Thank you.     Kitty Mckeon RN, BSN, PHN  [facility]  Nurse Triage, [facility]

## 2024-04-02 ENCOUNTER — TELEPHONE (OUTPATIENT)
Dept: FAMILY MEDICINE | Facility: CLINIC | Age: 24
End: 2024-04-02
Payer: COMMERCIAL

## 2024-04-02 NOTE — TELEPHONE ENCOUNTER
Forms/Letter Request    Type of form/letter: OTHER: Physical Housing Forms        Do we have the form/letter: Yes: Brought in on 3/26/24    Who is the form from? [facility] (if other please explain)    Where did/will the form come from? Patient or family brought in       When is form/letter needed by: ASAP    How would you like the form/letter returned: Fax : [phone]    Patient Notified form requests are processed in 5-7 business days:Yes    Could we send this information to you in Notegraphy or would you prefer to receive a phone call?:   Patient would prefer a phone call   Okay to leave a detailed message?: Yes at Cell number on file:    Telephone Information:   Mobile [phone]

## 2024-04-02 NOTE — TELEPHONE ENCOUNTER
Ana from [facility] is calling about these forms. Please advise if these have been completed. They brought these in during 3/26/24 OV.

## 2024-04-08 NOTE — TELEPHONE ENCOUNTER
Received provider signed Annual Physical Examination form, Standing Order Medication List form and POLST.  [facility] [phone], right fax confirmed at 4:08 pm today, 4/8/2024. Copy of Examination and Medication list form to Abstracting and POLST sent in orange folder to abstracting. Copy of all to TC's copy basket.  Kellie Olvera MA  [facility]   Primary Care

## 2024-04-08 NOTE — TELEPHONE ENCOUNTER
Ana calling again and needs these forms completed today. Please advise.  She states that this needs to be faxed to [phone] atten: Ana.  Kellie Olvera [facility]   Primary Care

## 2024-04-11 ENCOUNTER — OFFICE VISIT (OUTPATIENT)
Dept: PSYCHIATRY | Facility: CLINIC | Age: 24
End: 2024-04-11
Attending: PSYCHIATRY & NEUROLOGY
Payer: COMMERCIAL

## 2024-04-11 ENCOUNTER — DOCUMENTATION ONLY (OUTPATIENT)
Dept: OTHER | Facility: CLINIC | Age: 24
End: 2024-04-11
Payer: COMMERCIAL

## 2024-04-11 VITALS
DIASTOLIC BLOOD PRESSURE: 75 MMHG | BODY MASS INDEX: 25.05 KG/M2 | WEIGHT: 139.6 LBS | SYSTOLIC BLOOD PRESSURE: 110 MMHG | HEART RATE: 114 BPM

## 2024-04-11 DIAGNOSIS — F29 PSYCHOSIS, UNSPECIFIED PSYCHOSIS TYPE (H): Primary | ICD-10-CM

## 2024-04-11 DIAGNOSIS — F12.21 CANNABIS USE DISORDER, MODERATE, IN EARLY REMISSION (H): ICD-10-CM

## 2024-04-11 DIAGNOSIS — E55.9 VITAMIN D DEFICIENCY: ICD-10-CM

## 2024-04-11 PROCEDURE — G0463 HOSPITAL OUTPT CLINIC VISIT: HCPCS

## 2024-04-11 PROCEDURE — 99214 OFFICE O/P EST MOD 30 MIN: CPT | Mod: GC

## 2024-04-11 PROCEDURE — 90833 PSYTX W PT W E/M 30 MIN: CPT | Mod: HN

## 2024-04-11 RX ORDER — PALIPERIDONE 3 MG/1
6 TABLET, EXTENDED RELEASE ORAL AT BEDTIME
Qty: 60 TABLET | Refills: 1 | Status: SHIPPED | OUTPATIENT
Start: 2024-04-11 | End: 2024-04-12

## 2024-04-11 RX ORDER — OLANZAPINE 5 MG/1
10 TABLET ORAL AT BEDTIME
Qty: 60 TABLET | Refills: 1 | Status: SHIPPED | OUTPATIENT
Start: 2024-04-11 | End: 2024-04-12

## 2024-04-11 ASSESSMENT — PATIENT HEALTH QUESTIONNAIRE - PHQ9
SUM OF ALL RESPONSES TO PHQ QUESTIONS 1-9: 0
10. IF YOU CHECKED OFF ANY PROBLEMS, HOW DIFFICULT HAVE THESE PROBLEMS MADE IT FOR YOU TO DO YOUR WORK, TAKE CARE OF THINGS AT HOME, OR GET ALONG WITH OTHER PEOPLE: NOT DIFFICULT AT ALL
SUM OF ALL RESPONSES TO PHQ QUESTIONS 1-9: 0

## 2024-04-11 ASSESSMENT — PAIN SCALES - GENERAL: PAINLEVEL: NO PAIN (0)

## 2024-04-11 NOTE — PATIENT INSTRUCTIONS
**For crisis resources, please see the information at the end of this document**   Patient Education    Thank you for coming to the [facility] [place] [facility].     Lab Testing:  If you had lab testing today and your results are reassuring or normal they will be mailed to you or sent through [employer] within 7 days. If the lab tests need quick action we will call you with the results. The phone number we will call with results is # [phone]. If this is not the best number please call our clinic and change the number.     Medication Refills:  If you need any refills please call your pharmacy and they will contact us. Our fax number for refills is [phone].   Three business days of notice are needed for general medication refill requests.   Five business days of notice are needed for controlled substance refill requests.   If you need to change to a different pharmacy, please contact the new pharmacy directly. The new pharmacy will help you get your medications transferred.     Contact Us:  Please call [phone] during business hours (8-5:00 M-F).   If you have medication related questions after clinic hours, or on the weekend, please call [phone].     Financial Assistance [phone]   Medical Records [phone]       MENTAL HEALTH CRISIS RESOURCES:  For a emergency help, please call 911 or go to the nearest Emergency Department.     Emergency Walk-In Options:   EmPATH Unit @ [facility] ([place]): [phone] - Specialized mental health emergency area designed to be calming  LTAC, located within [facility] ([place]): [phone]  [facility] ([place]): [phone]  [facility]): [phone]    [facility] Crisis Information:   [place]: [phone]  [place]: [phone]  [place]ha (DARRYN) - Adult: [phone]     Child: [phone]  [place] - Adult: [phone]     Child: [phone]  [place]:  [phone]  List of all [facility] resources:   https://[place].gov/dhs/people-we-serve/adults/health-care/mental-health/resources/crisis-contacts.jsp    National Crisis Information:   Crisis Text Line: Text  [place]  to 572839  Suicide & Crisis Lifeline: 988  National Suicide Prevention Lifeline: 9-953-172-TALK (1-625.590.3820)       For online chat options, visit https://suicidepreventionlifeline.org/chat/  Poison Control Center: 1-314-763-5013  Trans Lifeline: 4-653-368-0819 - Hotline for transgender people of all ages  The Jacoby Project: 3-327-884-6707 - Hotline for LGBT youth     For Non-Emergency Support:   Fast Tracker: Mental Health & Substance Use Disorder Resources -   https://www.G-volutionckVideojugn.org/

## 2024-04-12 ENCOUNTER — TELEPHONE (OUTPATIENT)
Dept: PSYCHIATRY | Facility: CLINIC | Age: 24
End: 2024-04-12
Payer: COMMERCIAL

## 2024-04-12 DIAGNOSIS — F29 PSYCHOSIS, UNSPECIFIED PSYCHOSIS TYPE (H): ICD-10-CM

## 2024-04-12 RX ORDER — PALIPERIDONE 3 MG/1
6 TABLET, EXTENDED RELEASE ORAL AT BEDTIME
Qty: 60 TABLET | Refills: 1 | Status: SHIPPED | OUTPATIENT
Start: 2024-04-12 | End: 2024-05-23

## 2024-04-12 RX ORDER — OLANZAPINE 5 MG/1
10 TABLET ORAL AT BEDTIME
Qty: 60 TABLET | Refills: 1 | Status: SHIPPED | OUTPATIENT
Start: 2024-04-12 | End: 2024-05-23

## 2024-04-12 NOTE — TELEPHONE ENCOUNTER
[facility] Call [facility]    Phone Message    May a detailed message be left on voicemail: yes     Reason for Call: Other: The prescriptions have been getting sent to [facility] in [place], but is now using the [place] location at [address], [address]. They can best be found using their zip code.     Action Taken: Other: [facility]    Travel Screening: Not Applicable

## 2024-04-12 NOTE — TELEPHONE ENCOUNTER
Patient was taking 7.5 mg olanzapine and group home received directions to decrease olanzapine to 10 mg. They would like to clarify dose. Explained that patient will now be taking 10 mg at bedtime only.   She also asked that updated medication list be faxed to [phone], list faxed

## 2024-04-12 NOTE — LETTER
4/12/2024       RE: Amie Whitman Pamela  [address]  [facility] [address]         Current Outpatient Medications   Medication Sig Dispense Refill    OLANZapine (ZYPREXA) 5 MG tablet Take 2 tablets (10 mg) by mouth at bedtime 60 tablet 1    paliperidone ER (INVEGA) 3 MG 24 hr tablet Take 2 tablets (6 mg) by mouth at bedtime 60 tablet 1    vitamin D3 (CHOLECALCIFEROL) 50 mcg (2000 units) tablet Take 1 tablet (50 mcg) by mouth daily 30 tablet 6     No current facility-administered medications for this visit.         Sincerely,        Nabil Soares.MD

## 2024-04-17 ENCOUNTER — TELEPHONE (OUTPATIENT)
Dept: PSYCHIATRY | Facility: CLINIC | Age: 24
End: 2024-04-17
Payer: COMMERCIAL

## 2024-04-17 DIAGNOSIS — E55.9 VITAMIN D DEFICIENCY: ICD-10-CM

## 2024-04-17 NOTE — TELEPHONE ENCOUNTER
Upon chart review it appears that patient should have refills remaining. [facility] in [place] stated that prescriptions had been transferred to [facility] [place] [facility] - [place], [place] - [address]. Writer called this [place] and they confirmed they did have refills remaining.     Writer called [facility] to update. No further questions or concerns.

## 2024-04-17 NOTE — TELEPHONE ENCOUNTER
[facility] Call [facility]    Phone Message    May a detailed message be left on voicemail: yes     Reason for Call: Medication Refill Request    Has the patient contacted the pharmacy for the refill? Yes   Name of medication being requested: drisdol   Provider who prescribed the medication: Dr. Nabil Soares  Pharmacy: [facility]  Date medication is needed: asap -- pt has no medication left      Nurse at [facility] stated that they are completely out of medication. Nurse called pharmacy and there were no prescriptions available.    Action Taken: Other: [facility]    Travel Screening: Not Applicable

## 2024-04-18 ENCOUNTER — MYC MEDICAL ADVICE (OUTPATIENT)
Dept: PSYCHIATRY | Facility: CLINIC | Age: 24
End: 2024-04-18
Payer: COMMERCIAL

## 2024-04-22 NOTE — TELEPHONE ENCOUNTER
RN from [facility] called to request new order for vitamin drisdol. Caller stated that [facility] does not have order for Vitamin Drisdol, and requested call back at contact number provided.

## 2024-04-22 NOTE — TELEPHONE ENCOUNTER
Writer called [facility] and they confirmed patient's Vitamin D was delivered today. Writer called [facility] and they stated patient has a daily and a weekly dose of Vitamin D. RN at [facility] then states patient has not received her weekly dose since mid-March.

## 2024-04-24 NOTE — TELEPHONE ENCOUNTER
"Writer called patient to follow up on outreach request from provider.  Patient reports things are \"going well.\"  She denied any side effects or concerns.  She denied any current cannabis use.  She denied any safety concerns.  Provider notified.   "

## 2024-04-25 ENCOUNTER — TELEPHONE (OUTPATIENT)
Dept: PSYCHIATRY | Facility: CLINIC | Age: 24
End: 2024-04-25
Payer: COMMERCIAL

## 2024-04-25 NOTE — TELEPHONE ENCOUNTER
Writer called [facility] and spoke with RN to reiterate that weekly Vitamin D is no longer prescribed and patient should only be taking her daily dose.    RN requested that writer fax updated list to [facility] at [phone].    Writer faxed current med list to [facility].

## 2024-04-25 NOTE — TELEPHONE ENCOUNTER
[facility] Call [facility]    Phone Message    May a detailed message be left on voicemail: yes     Reason for Call: Medication Refill Request    Has the patient contacted the pharmacy for the refill? Yes   Name of medication being requested: vitamin d  Provider who prescribed the medication: shalini  Pharmacy: [facility] [place] [id number] - [place], [place] - [address]    Date medication is needed: patient is out of this medication    Action Taken: Message routed to:  Other: nursing pool    Travel Screening: Not Applicable

## 2024-05-01 NOTE — NURSING NOTE
Chief Complaint   Patient presents with    Recheck Medication     Psychosis, unspecified psychosis type     - Yogesh Cali, Visit Facilitator      gen nad  resp equal chest rise quyen  cv rrr

## 2024-05-06 ENCOUNTER — VIRTUAL VISIT (OUTPATIENT)
Dept: PSYCHIATRY | Facility: CLINIC | Age: 24
End: 2024-05-06
Payer: COMMERCIAL

## 2024-05-06 DIAGNOSIS — F29 PSYCHOSIS, UNSPECIFIED PSYCHOSIS TYPE (H): Primary | ICD-10-CM

## 2024-05-06 PROCEDURE — 99207 PR NON-BILLABLE SERV PER CHARTING: CPT | Mod: 95

## 2024-05-07 ENCOUNTER — TELEPHONE (OUTPATIENT)
Dept: PSYCHIATRY | Facility: CLINIC | Age: 24
End: 2024-05-07
Payer: COMMERCIAL

## 2024-05-07 NOTE — TELEPHONE ENCOUNTER
Per message - writer spoke to [facility] at [phone] regarding possible JAY administration at next appointment. Per CM, she & the patient were told by Dr Soares she would be able to start Invega Trinza since she tolerating oral Invega.     There is no mention of dosing or starting Invega Sustenna JAY in patient chart. There is a sentence regarding Invega Trinza     Writer explained there are no orders and insurance would need to approve in-clinic JAY.     Writer explained a message will be sent to Dr Soares for clarification and orders.    Writer agreed to contact CM once a plan is set. CM agreed.     Raquel Arias LPN Lead

## 2024-05-07 NOTE — TELEPHONE ENCOUNTER
[facility] Call [facility]    Phone Message    May a detailed message be left on voicemail: yes     Reason for Call: Other:  called to request if pt could be scheduled for injection before or after psychiatry appt on 5/9/24 with Dr. Nabil Soares MD. Caller is requesting nursing team call back  to discuss options for injection; pt is going out of town next week.     Action Taken: Other: [facility] nursing pool    Travel Screening: Not Applicable

## 2024-05-08 NOTE — TELEPHONE ENCOUNTER
Writer contacted Lynnette LANDRY PharmD regarding starting Invega Trinza after a patient has been on oral invega. ( See below for PharmD response)  The patient has an appointment with provider on Thursday.    Writer called Antonieta Elizalde CM at [phone] and left a DVM requesting a CB (direct line given) to give update on JAY.Raquel Arias LPN Lead      Hi, I have a quick question regarding Sustenna/Trinza. This pt is currently taking 6 mg oral invega sustenna. Nabil indicated in his note that he wants to start patient on 3 month trinza. My question is - doesn't the patient need to be on Invega Sustenna Q28D for 4 months then can transition to Invega Trinza Q90D. The patient's Group home is wanting to know if she can start this week on Trinza. Do you know if this can be done. I don't feel it would be safe to start a Q90D but I'm not a doctor      Tue 4:34 PM  MELIZA Blankenship, [facility]  tk, yes you are totally right. patient has to be on sustenna for 4 months with at least 2 months at the same dose before starting trinza

## 2024-05-08 NOTE — PROGRESS NOTES
[facility]  Psychiatry Clinic  Psychological Testing by Psychometrist     Amie Santiago MRN# [id number]   Age: 23 year old YOB: 2000     Date:  5/06/24    Video- Visit Details  Type of service:  video visit  Video start time: 4:00 PM  Video end time: 5:00 PM  Originating location (patient location):  [facility]   Location- Home  Distant Site (provider location): HIPAA compliant location Off-site  Platform used for video visit:  Secure real time interactive audio and visual telecommunication system via [employer]     Attendees: Patient attended the session alone  : No, English    Identifying Information/Reason for Referral  Amie Santiago is a 23 year old female who prefers the name Irene & pronouns She/Her.  Irene has a history of Psychosis.  The patient was seen for psychological testing. The purpose of the current psychological evaluation was to provide information about Irene's social, emotional and cognitive functioning, to assist with diagnostic clarification and to guide her treatment and recovery planning. Results of the following assessments are to be used in conjunction with the standard diagnostic evaluation.    A total of 60 minutes were spent in test administration and scoring by this writer, ciarra DC.  See Testing Evaluation Report on future date for a full interpretation of the findings and data.     Summary of Services/Procedures  Irene was administered a psychological test battery tailored to her age and the referral questions.     Tests Administered  [place] Symptom Index  MIRECC Global Assessment of Functioning  Illness Management and Recovery - Practitioner Rating  Illness Management and Recovery - Self Rating  Test MY Brain  Vinogradov Symptom Severity Scale  Post Traumatic Stress Disorder Checklist (PCL-5 8 Item)  Audit-C  DAST-10  PROMIS-Sleep Disturbance  International Physical Activity  Questionnaire (IPAQ)  Behavioral Inhibition and Activation Scale- BIS/BAS  Brief [place]-28 Item  Life Event Checklist- LEC Intake   EPINET Core Assessment Battery Intake Shared Decision Making in Clinical Encounters  Intersectional Discrimination Index  Stress Screener for Recent Events      Behavioral Observations  (NOTE: Additional behavioral observations and summary statement regarding validity of testing completed. Delete out grey italics before closing note).  Overall, Irene demonstrated good effort throughout testing. Therefore, the current evaluation results are thought to provide a accurate representation of her functioning in the areas assessed.     Plan  (NOTE: Delete out grey italics before closing note. If more testing is scheduled, use this:)  Testing is NOT complete. Patient is scheduled to return to complete additional testing on May / 08 / 2024.    [If completed] The patient and their invited support system will participate in a feedback appointment on a future date with their clinician.     Will coordinate care with other treatment providers to assist with planning as needed.      CHARISSE LORA  Psychometrist      Billing for this encounter (CPT 50427, 96447) will occur on a later date when a qualified health professional reviews the findings with the patient in a psychological evaluation appointment (CPT 36072, 23648).

## 2024-05-09 ENCOUNTER — TELEPHONE (OUTPATIENT)
Dept: PSYCHIATRY | Facility: CLINIC | Age: 24
End: 2024-05-09
Payer: COMMERCIAL

## 2024-05-09 DIAGNOSIS — F29 PSYCHOSIS, UNSPECIFIED PSYCHOSIS TYPE (H): Primary | ICD-10-CM

## 2024-05-09 NOTE — TELEPHONE ENCOUNTER
[facility] Call [facility]    Phone Message    May a detailed message be left on voicemail: yes     Reason for Call: Other: the pt  came in today and asked about the PRN and would like the nurse to give her a call her name Antonieta Cristiano # [phone]     Action Taken: Other: nurse pool    Travel Screening: Not Applicable

## 2024-05-09 NOTE — TELEPHONE ENCOUNTER
Attempted to call patient's , Antonieta Quinonez.  No answer. Left voicemail to call back clinic at [phone].

## 2024-05-10 RX ORDER — OLANZAPINE 5 MG/1
2.5-5 TABLET ORAL DAILY PRN
Qty: 20 TABLET | Refills: 0 | Status: SHIPPED | OUTPATIENT
Start: 2024-05-10 | End: 2024-05-13

## 2024-05-10 NOTE — TELEPHONE ENCOUNTER
Patient's , Antonieta Quinonez, returned call to writer.  She came into the clinic yesterday for an appointment with Dr. Soares that she didn't realize had been cancelled. Patient and Antonieta were hoping to discuss getting a PRN prescription for break through symptoms (AH/VH) for next week because the patient is going out of town.  Antonieta said that they had discussed the possibility of Zyprexa as a potential PRN option since the patient is already taking it.  They were hoping to hear back from a covering provider today.   If a PRN can be prescribed they would prefer it be sent to the [facility] on [address].   Antonieta would like a call back with provider recommendations at [phone].  She said it was ok for a detailed messaged to be left.

## 2024-05-13 RX ORDER — OLANZAPINE 2.5 MG/1
2.5-5 TABLET, FILM COATED ORAL DAILY PRN
Qty: 60 TABLET | Refills: 0 | Status: SHIPPED | OUTPATIENT
Start: 2024-05-13 | End: 2024-06-06

## 2024-05-13 NOTE — TELEPHONE ENCOUNTER
Received incoming phone call from Irene and her mother, Lizy. They report [facility] on [address] never received the PRN Zyprexa Rx from 5/10. Reviewed patient's chart and it appears the Rx was sent. However, insurance likely won't cover two prescriptions for 5 mg strength tabs. Suggested sending 2.5 mg tabs; taking 1-2 tabs daily PRN for anxiety associated with hallucinations. Both Irene and her mom agreed to this plan. Agreed to call Chiki to resolve the issue and will then call Irene back at the number on file.    Following this discussion, writer received a message from Dr. Soares with the following orders (patient is slowly transitioning to JAY):    -Okay to continue with PRN Zyprexa 2.5 mg - 5 mg daily PRN  -Lower nighttime Zyprexa from 10 mg to 5 mg  -Increase nighttime paliperidone (Invega) from 6 mg to 9 mg    Called Irene and her mom back and relayed the above recommendations. Mom agreed to these orders, but they are currently traveling and will be out of town until Sunday, 5/19. She believes they may have enough Invega 3 mg tabs to make this change, but cannot confirm until they are done driving. She asked that writer send Irene a [employer] message with the above medication recommendations. They will then respond with whether they have enough medication to make the medication recommendations now or if they will need to wait until Sunday, 5/19 or Monday, 5/20 when they are home. However, Dr. Soares would prefer they wait so she's not making adjustments while out of town. Will pass along this recommendation as well. Mom is aware the JAY will not be initiated for a few more weeks to 1 month and that patient is scheduled next with Dr. Soares on 5/23.    Phone call to Chiki:  -Writer called x2 and sat on hold for 30 minutes each time. Will send updated Rx for 2.5 mg tabs with note to discontinue previous Rx for 5 mg.    Cecelia Munoz RN on 5/13/2024 at 10:36 AM

## 2024-05-23 ENCOUNTER — OFFICE VISIT (OUTPATIENT)
Dept: PSYCHIATRY | Facility: CLINIC | Age: 24
End: 2024-05-23
Attending: PSYCHIATRY & NEUROLOGY
Payer: COMMERCIAL

## 2024-05-23 VITALS
SYSTOLIC BLOOD PRESSURE: 103 MMHG | HEART RATE: 106 BPM | BODY MASS INDEX: 25.41 KG/M2 | DIASTOLIC BLOOD PRESSURE: 72 MMHG | WEIGHT: 141.6 LBS

## 2024-05-23 DIAGNOSIS — F29 PSYCHOSIS, UNSPECIFIED PSYCHOSIS TYPE (H): Primary | ICD-10-CM

## 2024-05-23 DIAGNOSIS — F12.21 CANNABIS USE DISORDER, MODERATE, IN EARLY REMISSION (H): ICD-10-CM

## 2024-05-23 PROCEDURE — 99214 OFFICE O/P EST MOD 30 MIN: CPT | Mod: GC

## 2024-05-23 PROCEDURE — 90833 PSYTX W PT W E/M 30 MIN: CPT | Mod: HN

## 2024-05-23 PROCEDURE — G0463 HOSPITAL OUTPT CLINIC VISIT: HCPCS

## 2024-05-23 RX ORDER — OLANZAPINE 5 MG/1
10 TABLET ORAL AT BEDTIME
Qty: 60 TABLET | Refills: 1 | Status: SHIPPED | OUTPATIENT
Start: 2024-05-23 | End: 2024-06-03

## 2024-05-23 RX ORDER — PALIPERIDONE 9 MG/1
9 TABLET, EXTENDED RELEASE ORAL AT BEDTIME
Qty: 30 TABLET | Refills: 1 | Status: SHIPPED | OUTPATIENT
Start: 2024-05-23 | End: 2024-06-06

## 2024-05-23 ASSESSMENT — PAIN SCALES - GENERAL: PAINLEVEL: NO PAIN (0)

## 2024-05-23 NOTE — NURSING NOTE
Chief Complaint   Patient presents with    Recheck Medication     Psychosis, unspecified psychosis type     Blood pressure 103/72, pulse 106, weight 64.2 kg (141 lb 9.6 oz), last menstrual period 02/17/2024, not currently breastfeeding.    - Yogesh Cali, Visit Facilitator
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
## 2024-05-23 NOTE — PATIENT INSTRUCTIONS
Good seeing you today Amie.   Here is a brief summary of medication changes made today and the next steps.    Medications: Prescriptions sent electronically to [id number] - [place], [place] - [address] AT [address] [phone]     -Continue Vitamin D 25 mcg PO Qday.      -Increase Invega to 9 mg (1 tablet) PO at bed time. Please note new prescription are 9 mg tablets.      -Continue olanzapine 10 mg PO at bedtime AND in 7 days from today, decrease Olanzapine to 5 mg PO at bedtime.    Pending a call from our nurses in about 4 weeks from now, will consider further decreasing olanzapine to 2.5 mg PO at bedtime and you'll stay on that dose until you meet with the next resident in July.   Goal is to cross-titrate olanzapine to Ivega PO and eventually monotherapy with Invega Trinza JAY.    You should expect a call from our scheduling team mid-late June to schedule the next visit.     Please reach out if you have questions or concerns.  It has been a pleasure working with you. Good luck with everything!   Best,  Nabil Soares MD              **For crisis resources, please see the information at the end of this document**   Patient Education    Thank you for coming to the [facility] [place] [facility].     Lab Testing:  If you had lab testing today and your results are reassuring or normal they will be mailed to you or sent through Fuze within 7 days. If the lab tests need quick action we will call you with the results. The phone number we will call with results is # [phone]. If this is not the best number please call our clinic and change the number.     Medication Refills:  If you need any refills please call your pharmacy and they will contact us. Our fax number for refills is [phone].   Three business days of notice are needed for general medication refill requests.   Five business days of notice are needed for controlled  substance refill requests.   If you need to change to a different pharmacy, please contact the new pharmacy directly. The new pharmacy will help you get your medications transferred.     Contact Us:  Please call [phone] during business hours (8-5:00 M-F).   If you have medication related questions after clinic hours, or on the weekend, please call [phone].     Financial Assistance [phone]   Medical Records [phone]       MENTAL HEALTH CRISIS RESOURCES:  For a emergency help, please call 911 or go to the nearest Emergency Department.     Emergency Walk-In Options:   EmPATH Unit @ [facility] ([place]): [phone] - Specialized mental health emergency area designed to be calming  [facility] ([place]): [phone]  [facility] ([place]): [phone]  [facility] ([place]): [phone]    County Crisis Information:   [place]: [phone]  [place]: [phone]  [place] (COPE) - Adult: [phone]     Child: [phone]  [place] - Adult: [phone]     Child: [phone]  [place]: [phone]  List of all [facility] resources:   https://[place].[facility]/dhs/people-we-serve/adults/health-care/mental-health/resources/crisis-contacts.jsp    National Crisis Information:   Crisis Text Line: Text  [place]  to [address]  Suicide & Crisis Lifeline: 988  National Suicide Prevention Lifeline: 7-544-114-TALK (1-627.361.5110)       For online chat options, visit https://suicidepreventionlifeline.org/chat/  Poison Control Center: 1-992.116.8661  Trans Lifeline: 1-771.269.7870 - Hotline for transgender people of all ages  The Jacoby Project: 5-652-142-7596 - Hotline for LGBT youth     For Non-Emergency Support:   Fast Tracker: Mental Health & Substance Use Disorder Resources -   https://www.avocarrotn.org/

## 2024-05-28 ENCOUNTER — TELEPHONE (OUTPATIENT)
Dept: FAMILY MEDICINE | Facility: CLINIC | Age: 24
End: 2024-05-28
Payer: COMMERCIAL

## 2024-05-28 ENCOUNTER — TELEPHONE (OUTPATIENT)
Dept: PSYCHIATRY | Facility: CLINIC | Age: 24
End: 2024-05-28
Payer: COMMERCIAL

## 2024-05-28 ENCOUNTER — MYC MEDICAL ADVICE (OUTPATIENT)
Dept: PSYCHIATRY | Facility: CLINIC | Age: 24
End: 2024-05-28
Payer: COMMERCIAL

## 2024-05-28 DIAGNOSIS — F29 PSYCHOSIS, UNSPECIFIED PSYCHOSIS TYPE (H): ICD-10-CM

## 2024-05-28 NOTE — TELEPHONE ENCOUNTER
Writer returned call to Antonieta Quinonez who states that patient is moving to adult foster care and patient will need signed medical orders and a form filled out for this. Jayshree will email the form to [email or web address].

## 2024-05-28 NOTE — TELEPHONE ENCOUNTER
Received a call back from Antonieta who states that the medication/treatment list is needed by the end of the week. Patient has been living in an Airbnb 2 blocks from mother and patient's mother is getting  out of town mid-June and would ideally like patient to be settled in adult foster care prior to this.     Antonieta is going to reach out to the facility to see how soon patient can get in if information is received on Monday.

## 2024-05-28 NOTE — TELEPHONE ENCOUNTER
Noted. Placed sticky note on fax machine. Will watch for forms.  Kellie Olvera MA  [facility]   Primary Care

## 2024-05-28 NOTE — TELEPHONE ENCOUNTER
Med list shows:  cholecalciferol - 50 mcg daily  olanzapine 2.5-5 mg daily PRN  olanzapine 10 mg at bedtime  paliperidone 9 mg at bedtime.     Your note (though still open) says:  -Continue Vitamin D 25 mcg PO Qday.      -Increase Invega to 9 mg (1 tablet) PO at bed time. Please note new prescription are 9 mg tablets.      -Continue olanzapine 10 mg PO at bedtime AND in 7 days from today, decrease Olanzapine to 5 mg PO at bedtime.     Pending a call from our nurses in about 4 weeks from now, will consider further decreasing olanzapine to 2.5 mg PO at bedtime and you'll stay on that dose until you meet with the next resident in July.   Goal is to cross-titrate olanzapine to Ivega PO and eventually monotherapy with Invega Trinza JAY.

## 2024-05-28 NOTE — TELEPHONE ENCOUNTER
Received Release of Information (2 pages) for Antonieta Quinonez with [facility]. MARCEL sent to scanning and being held in nurse triage until scanning is confirmed.

## 2024-05-28 NOTE — TELEPHONE ENCOUNTER
[facility] Call Center    Phone Message    May a detailed message be left on voicemail: yes     Reason for Call: Other: Patient's  with [facility] called, requesting to speak to a nurse. The patient will be transitioning to adult foster care, and they require that the patient has a list of current medications and treatments, along with signed script. There is a form for this that needs to be completed ASAP, and so the case manger was hoping to get a call-back today if possible.     Action Taken: Message routed to:  Other: [facility] psychiatry nurse pool    Travel Screening: Not Applicable

## 2024-05-28 NOTE — TELEPHONE ENCOUNTER
Writer attempted to call Antonieta to clarify date information needed as provider is out this week and there is a discrepancy between medication list and last visit note that is still open. LVM requesting a call back at the main clinic number.

## 2024-05-28 NOTE — TELEPHONE ENCOUNTER
Forms/Letter Request    Type of form/letter:  Physical form    Have you been seen for this request: Yes patient was seen 2/14/2024    Do we have the form/letter: No, caregiver will fax over forms    When is form/letter needed by: As soon as possible    How would you like the form/letter returned: Fax [phone]    Patient Notified form requests are processed in 3-5 business days:No    Could we send this information to you in [facility] or would you prefer to receive a phone call?:   Patient would prefer a phone call   Okay to leave a detailed message?: Yes at Other phone number: [phone]

## 2024-05-29 ENCOUNTER — MEDICAL CORRESPONDENCE (OUTPATIENT)
Dept: HEALTH INFORMATION MANAGEMENT | Facility: CLINIC | Age: 24
End: 2024-05-29

## 2024-05-30 NOTE — TELEPHONE ENCOUNTER
Form completed by provider. Form faxed to [facility] [phone] on 5/30/24 at 7:00am. Copy placed in abstract and original in tc bin.

## 2024-06-03 RX ORDER — OLANZAPINE 5 MG/1
5 TABLET ORAL AT BEDTIME
Qty: 30 TABLET | Refills: 1 | Status: SHIPPED | OUTPATIENT
Start: 2024-06-03 | End: 2024-06-06

## 2024-06-03 NOTE — TELEPHONE ENCOUNTER
Medication list signed by Dr. Soares. Writer faxed medication list to Antonieta Quinonez (MARCEL on file).

## 2024-06-05 ENCOUNTER — MYC MEDICAL ADVICE (OUTPATIENT)
Dept: PSYCHIATRY | Facility: CLINIC | Age: 24
End: 2024-06-05
Payer: COMMERCIAL

## 2024-06-05 DIAGNOSIS — F29 PSYCHOSIS, UNSPECIFIED PSYCHOSIS TYPE (H): ICD-10-CM

## 2024-06-06 RX ORDER — OLANZAPINE 2.5 MG/1
2.5-5 TABLET, FILM COATED ORAL DAILY PRN
Qty: 60 TABLET | Refills: 0 | Status: SHIPPED | OUTPATIENT
Start: 2024-06-06 | End: 2024-08-14

## 2024-06-06 RX ORDER — PALIPERIDONE 9 MG/1
9 TABLET, EXTENDED RELEASE ORAL AT BEDTIME
Qty: 30 TABLET | Refills: 1 | Status: SHIPPED | OUTPATIENT
Start: 2024-06-06 | End: 2024-08-14

## 2024-06-06 RX ORDER — OLANZAPINE 5 MG/1
5 TABLET ORAL AT BEDTIME
Qty: 30 TABLET | Refills: 1 | Status: SHIPPED | OUTPATIENT
Start: 2024-06-06 | End: 2024-08-14

## 2024-06-06 NOTE — TELEPHONE ENCOUNTER
Health Call Center    Phone Message    May a detailed message be left on voicemail: yes     Reason for Call: Other: Valerie () from [facility] called to notify us that pt moved in today and stated that  said it's okay for her to take Olazapine 5mg and Paliperidone ER 9MG in the evening but the medication instructions states that pt should take medication at bedtime. Valerie would like a call back on clear instructions so she can make sure pt is taking the medications properly.        Action Taken: Other: [facility]     Travel Screening: Not Applicable

## 2024-06-06 NOTE — TELEPHONE ENCOUNTER
We have also received refills for the Olanzapine 5 mg . Last filled on 6-6-24 qty, 30     Lashay Ramiro on 6/6/2024 at 12:08 PM

## 2024-06-10 ENCOUNTER — TELEPHONE (OUTPATIENT)
Dept: PSYCHIATRY | Facility: CLINIC | Age: 24
End: 2024-06-10
Payer: COMMERCIAL

## 2024-07-09 ENCOUNTER — TELEPHONE (OUTPATIENT)
Dept: PSYCHIATRY | Facility: CLINIC | Age: 24
End: 2024-07-09
Payer: COMMERCIAL

## 2024-07-09 NOTE — TELEPHONE ENCOUNTER
Nursing received email:     This is regarding Amie Santiago,  2000.  I called with her yesterday to set up a psyc appt with a new resident.  She had been seeing Nabil Arcadiohugo.  She is set to see Ariadna Vera on October 3 at 8:40 am.  The plan the last 3 or 4 months have been titrating Amie on Invenga and off Zyprexa with the goal of her getting an JAY of Invega.  I was informed that Amie has been placed on the waiting list if something opens up sooner.      I am hoping we don t have to wait until October for her to begin getting these injections.  Can you help?  I believe the group home can give the injections.  I have cc bassam Ramirez (day staff at her foster care home) in this email so she can let us know if someone can give the injection in the home.    Antonieta Quinonez, RADHA, LICSW  [facility] Case Management  [address]  [address]  Phone - [phone]  Fax - [phone]  [email or web address]

## 2024-07-12 NOTE — TELEPHONE ENCOUNTER
Writer was alerted of the current situation for Amie. Reviewed patient's chart. Dr. Soares's last office visit note states the following:    Future Considerations:  -Goal is  transition to JAY monotherapy (per authorized Harris order) due to concerns for medication adherence and multiple ED visits/recent hospitalizations.     -Increase Invega to 9 mg PO at bed time  -Continue olanzapine 10 mg PO at bedtime AND in 7 days from today, decrease Olanzapine to 5 mg PO at bedtime.  Pending outreach call in 4 weeks from now, will consider further decreasing olanzapine to 2.5 mg PO at bedtime until Irene meets with the next resident in July.      Goal is to cross-titrate olanzapine to Ivega PO>Invega Sustenna and eventually monotherapy with Invega Trinza JAY.    Writer called Antonieta to discuss her request further. No answer. LVM requesting a c/b.    Follow-up needed:  -Discuss Harris order with DONNA Fung  -Receive update on patient's mental status  -F/up with  staff for update if necessary  -Connect with Dr. Gamez for recommendations  -If patient is not doing well, look into an urgent 60 min visit with G4    Cecelia Munoz RN on 7/12/2024 at 10:26 AM

## 2024-07-15 NOTE — TELEPHONE ENCOUNTER
Writer called Antonieta and left an additional VM requesting a c/b to discuss the patient's JAY.     Cecelia Munoz RN on 7/15/2024 at 10:30 AM

## 2024-07-15 NOTE — TELEPHONE ENCOUNTER
Good morning,     Lashay is correct, we cannot order the Invega Sustenna JAY until the patient is first seen by the provider. Once the in-person appointment has started, ask the provider to Secure Chat a VORB to start the insurance check if JAY is to be administered in clinic. Maybe it will be approved before the patient leaves the clinic and can be administered that day.     Ideally, it would be best for the patient to come into the clinic for monthly JAY with MFU same day as JYA as followup. That way we can keep eyes on the patient and close followup.    Irene will need to be on the Sustenna JAY for 4 consecutive months with no break in administration THEN can transition to Invega Trinza after month 4, with provider approval and new CAM order if given in clinic otherwise an AMB order sent to  pharmacy    If the plan is to have the  administer the JAY, the prescription would need to be entered as an AMB order and sent to the 's preferred pharmacy.     Raquel

## 2024-07-17 NOTE — TELEPHONE ENCOUNTER
Attempted to reach out to Antonieta to coordinate care. No answer at number provided. LVM, requesting a call back. Clinic number provided.

## 2024-07-22 NOTE — TELEPHONE ENCOUNTER
Received return call from Antonieta. She apologizes for the delay and reports she was out of office all last week. Antonieta believes the patient is doing well, but because she was off all last week, she feels it will be best to receive an update from the  staff. Informed her the team needs a MARCEL for the  before doing so. Antonieta will be meeting with the patient for a team meeting tomorrow and requested this writer email her a blank MARCEL for the patient to complete tomorrow. Writer did so and asked that Antonieta send the completed form to the nurse psychiatry email.     Follow-up:  -Watch for completed MARCEL for group home  -Call group home to get an update on the patient. In particular, would like to confirm if the patient has been taking the oral Invega and if so, is it effective and has she experienced any side effects.  -Will discuss the possibility of switching to an JAY before her appt on 10/3 with Dr. Vera Munoz, RN on 7/22/2024 at 10:22 AM
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
## 2024-07-29 ENCOUNTER — TELEPHONE (OUTPATIENT)
Dept: PSYCHIATRY | Facility: CLINIC | Age: 24
End: 2024-07-29
Payer: COMMERCIAL

## 2024-07-29 NOTE — TELEPHONE ENCOUNTER
On 7/23/24 the patient signed a MARCEL authorizing the release of records from [facility] to [facility] (Fax: [phone]). MARCEL sent to scanning on 7/29/24.    Cecelia Munoz RN on 7/29/2024 at 2:34 PM

## 2024-07-29 NOTE — TELEPHONE ENCOUNTER
Writer called Antonieta and SHARI relaying Dr. Gamez's recommendation. Requested a c/b if she does have concerns about Irene's adherence or wellbeing, at which time this writer can look into scheduling an urgent visit. Writer also reminded Antonieta to send the MARCEL, so the clinic team can communicate with the .    Cecelia Munoz RN on 7/29/2024 at 8:59 AM

## 2024-07-29 NOTE — TELEPHONE ENCOUNTER
Writer received completed and signed MARCEL for [facility]. This is only for the clinic to release information to the residence.     Called [facility] at [phone]. Was transferred to the nurse line at [phone]. No answer. LVM requesting a c/b to receive an update on the patient and whether she has been compliant with taking her medications.     Cecelia Munoz RN on 7/29/2024 at 2:46 PM

## 2024-08-14 ENCOUNTER — TELEPHONE (OUTPATIENT)
Dept: PSYCHIATRY | Facility: CLINIC | Age: 24
End: 2024-08-14
Payer: COMMERCIAL

## 2024-08-14 DIAGNOSIS — F29 PSYCHOSIS, UNSPECIFIED PSYCHOSIS TYPE (H): ICD-10-CM

## 2024-08-14 RX ORDER — PALIPERIDONE 9 MG/1
9 TABLET, EXTENDED RELEASE ORAL AT BEDTIME
Qty: 30 TABLET | Refills: 1 | Status: SHIPPED | OUTPATIENT
Start: 2024-08-14 | End: 2024-09-23

## 2024-08-14 RX ORDER — OLANZAPINE 5 MG/1
5 TABLET ORAL AT BEDTIME
Qty: 30 TABLET | Refills: 1 | Status: SHIPPED | OUTPATIENT
Start: 2024-08-14 | End: 2024-09-23

## 2024-08-14 RX ORDER — OLANZAPINE 2.5 MG/1
2.5-5 TABLET, FILM COATED ORAL DAILY PRN
Qty: 60 TABLET | Refills: 0 | Status: SHIPPED | OUTPATIENT
Start: 2024-08-14

## 2024-08-14 NOTE — TELEPHONE ENCOUNTER
[facility] Call Center    Phone Message    May a detailed message be left on voicemail: yes     Reason for Call: Medication Refill Request    Has the patient contacted the pharmacy for the refill? Yes   Name of medication being requested: olanzopine, paliperidone  Provider who prescribed the medication: Dr. Ariadna Gamez MD  Pharmacy: [facility]   Date medication is needed: did not state    Pharmacy said there are no refills left for medications requested.     Action Taken: Other: [facility]    Travel Screening: Not Applicable     Date of Service:

## 2024-08-14 NOTE — TELEPHONE ENCOUNTER
Last seen: 5/23/24 with Dr Soares  RTC: 6 weeks with new resident  Cancel: none  No-show: none  Next appt: 10/3/24       Medication requested:   Pending Prescriptions:                       Disp   Refills    OLANZapine (ZYPREXA) 5 MG tablet          30 tab*1            Sig: Take 1 tablet (5 mg) by mouth at bedtime    OLANZapine (ZYPREXA) 2.5 MG tablet        60 tab*0            Sig: Take 1-2 tablets (2.5-5 mg) by mouth daily as           needed (for breakthrough hallucinations and           anxiety associated with hallucinations.)    paliperidone ER (INVEGA) 9 MG 24 hr [id number] tab*1            Sig: Take 1 tablet (9 mg) by mouth at bedtime        From chart note:   -Increase Invega to 9 mg PO at bed time  -Continue olanzapine 10 mg PO at bedtime AND in 7 days from today, decrease Olanzapine to 5 mg PO at bedtime.  Pending outreach call in 4 weeks from now, will consider further decreasing olanzapine to 2.5 mg PO at bedtime until Irene meets with the next resident in July.      Medication refill approved per refill protocol.

## 2024-08-22 DIAGNOSIS — E55.9 VITAMIN D DEFICIENCY: ICD-10-CM

## 2024-08-23 RX ORDER — CHOLECALCIFEROL (VITAMIN D3) 50 MCG
1 TABLET ORAL DAILY
Qty: 28 TABLET | Refills: 0 | Status: SHIPPED | OUTPATIENT
Start: 2024-08-23 | End: 2024-09-19

## 2024-08-23 NOTE — TELEPHONE ENCOUNTER
"Date of Last Office Visit: 5/23/2024  [facility]      RTC:  6 weeks (or earlier if needed) with the new resident.    No shows: 0  Cancellations: 0  Date of Next Office Visit:    10/3/2024 8:40 AM (60 min)  Tomasa   Arrive by:  8:25 AM   PSYCHOSIS RETURN   URPSY ([facility])   Ariadna Gamez MD     ------------------------------    Incoming refill from Pharmacy via interface  Medication requested:    vitamin D3 (CHOLECALCIFEROL) 50 mcg (2000 units) tablet 30 tablet 6 1/25/2024 -- No   Sig - Route: Take 1 tablet (50 mcg) by mouth daily - Oral     ------------------------------  From last visit note:   \"-Continue Vitamin D 50 mcg PO Qday.   -Increase Invega to 9 mg PO at bed time  -Continue olanzapine 10 mg PO at bedtime AND in 7 days from today, decrease Olanzapine to 5 mg PO at bedtime.  Pending outreach call in 4 weeks from now, will consider further decreasing olanzapine to 2.5 mg PO at bedtime until Irene meets with the next resident in July.      Goal is to cross-titrate olanzapine to Ivega PO>Invega Sustenna and eventually monotherapy with Invega Trinza JAY.\"       Refill decision: Medication refilled per protocol.                         "

## 2024-09-13 ENCOUNTER — OFFICE VISIT (OUTPATIENT)
Dept: FAMILY MEDICINE | Facility: CLINIC | Age: 24
End: 2024-09-13
Payer: COMMERCIAL

## 2024-09-13 ENCOUNTER — APPOINTMENT (OUTPATIENT)
Dept: CT IMAGING | Facility: CLINIC | Age: 24
End: 2024-09-13
Attending: EMERGENCY MEDICINE
Payer: COMMERCIAL

## 2024-09-13 ENCOUNTER — HOSPITAL ENCOUNTER (EMERGENCY)
Facility: CLINIC | Age: 24
Discharge: HOME | End: 2024-09-13
Attending: EMERGENCY MEDICINE | Admitting: EMERGENCY MEDICINE
Payer: COMMERCIAL

## 2024-09-13 VITALS
DIASTOLIC BLOOD PRESSURE: 57 MMHG | HEART RATE: 86 BPM | BODY MASS INDEX: 23.21 KG/M2 | SYSTOLIC BLOOD PRESSURE: 85 MMHG | RESPIRATION RATE: 19 BRPM | HEIGHT: 63 IN | WEIGHT: 131 LBS | OXYGEN SATURATION: 97 % | TEMPERATURE: 97.9 F

## 2024-09-13 VITALS
HEART RATE: 80 BPM | HEIGHT: 62 IN | TEMPERATURE: 98.3 F | SYSTOLIC BLOOD PRESSURE: 91 MMHG | OXYGEN SATURATION: 98 % | RESPIRATION RATE: 16 BRPM | WEIGHT: 132 LBS | BODY MASS INDEX: 24.29 KG/M2 | DIASTOLIC BLOOD PRESSURE: 50 MMHG

## 2024-09-13 DIAGNOSIS — R51.9 NONINTRACTABLE HEADACHE, UNSPECIFIED CHRONICITY PATTERN, UNSPECIFIED HEADACHE TYPE: ICD-10-CM

## 2024-09-13 DIAGNOSIS — R55 SYNCOPE AND COLLAPSE: ICD-10-CM

## 2024-09-13 DIAGNOSIS — R55 SYNCOPE, UNSPECIFIED SYNCOPE TYPE: Primary | ICD-10-CM

## 2024-09-13 LAB
ALBUMIN UR-MCNC: 30 MG/DL
ANION GAP SERPL CALCULATED.3IONS-SCNC: 10 MMOL/L (ref 7–15)
APPEARANCE UR: ABNORMAL
BACTERIA #/AREA URNS HPF: ABNORMAL /HPF
BASOPHILS # BLD AUTO: 0 10E3/UL (ref 0–0.2)
BASOPHILS NFR BLD AUTO: 0 %
BILIRUB UR QL STRIP: NEGATIVE
BUN SERPL-MCNC: 8.7 MG/DL (ref 6–20)
CALCIUM SERPL-MCNC: 8.9 MG/DL (ref 8.8–10.4)
CHLORIDE SERPL-SCNC: 106 MMOL/L (ref 98–107)
COLOR UR AUTO: YELLOW
CREAT SERPL-MCNC: 0.71 MG/DL (ref 0.51–0.95)
EGFRCR SERPLBLD CKD-EPI 2021: >90 ML/MIN/1.73M2
EOSINOPHIL # BLD AUTO: 0.1 10E3/UL (ref 0–0.7)
EOSINOPHIL NFR BLD AUTO: 1 %
ERYTHROCYTE [DISTWIDTH] IN BLOOD BY AUTOMATED COUNT: 12.7 % (ref 10–15)
GLUCOSE SERPL-MCNC: 93 MG/DL (ref 70–99)
GLUCOSE UR STRIP-MCNC: NEGATIVE MG/DL
HCG SERPL QL: NEGATIVE
HCO3 SERPL-SCNC: 23 MMOL/L (ref 22–29)
HCT VFR BLD AUTO: 33.5 % (ref 35–47)
HGB BLD-MCNC: 11.5 G/DL (ref 11.7–15.7)
HGB UR QL STRIP: ABNORMAL
HOLD SPECIMEN: NORMAL
HYALINE CASTS: 1 /LPF
IMM GRANULOCYTES # BLD: 0 10E3/UL
IMM GRANULOCYTES NFR BLD: 0 %
KETONES UR STRIP-MCNC: NEGATIVE MG/DL
LEUKOCYTE ESTERASE UR QL STRIP: ABNORMAL
LYMPHOCYTES # BLD AUTO: 1.9 10E3/UL (ref 0.8–5.3)
LYMPHOCYTES NFR BLD AUTO: 33 %
MCH RBC QN AUTO: 29.9 PG (ref 26.5–33)
MCHC RBC AUTO-ENTMCNC: 34.3 G/DL (ref 31.5–36.5)
MCV RBC AUTO: 87 FL (ref 78–100)
MONOCYTES # BLD AUTO: 0.4 10E3/UL (ref 0–1.3)
MONOCYTES NFR BLD AUTO: 8 %
MUCOUS THREADS #/AREA URNS LPF: PRESENT /LPF
NEUTROPHILS # BLD AUTO: 3.3 10E3/UL (ref 1.6–8.3)
NEUTROPHILS NFR BLD AUTO: 58 %
NITRATE UR QL: NEGATIVE
NRBC # BLD AUTO: 0 10E3/UL
NRBC BLD AUTO-RTO: 0 /100
PH UR STRIP: 6.5 [PH] (ref 5–7)
PLATELET # BLD AUTO: 183 10E3/UL (ref 150–450)
POTASSIUM SERPL-SCNC: 4 MMOL/L (ref 3.4–5.3)
RBC # BLD AUTO: 3.85 10E6/UL (ref 3.8–5.2)
RBC URINE: 4 /HPF
SODIUM SERPL-SCNC: 139 MMOL/L (ref 135–145)
SP GR UR STRIP: 1.03 (ref 1–1.03)
SQUAMOUS EPITHELIAL: 6 /HPF
TROPONIN T SERPL HS-MCNC: 7 NG/L
TSH SERPL DL<=0.005 MIU/L-ACNC: 1.32 UIU/ML (ref 0.3–4.2)
UROBILINOGEN UR STRIP-MCNC: NORMAL MG/DL
WBC # BLD AUTO: 5.8 10E3/UL (ref 4–11)
WBC URINE: 12 /HPF

## 2024-09-13 PROCEDURE — 81001 URINALYSIS AUTO W/SCOPE: CPT | Performed by: EMERGENCY MEDICINE

## 2024-09-13 PROCEDURE — 87086 URINE CULTURE/COLONY COUNT: CPT | Performed by: EMERGENCY MEDICINE

## 2024-09-13 PROCEDURE — 85025 COMPLETE CBC W/AUTO DIFF WBC: CPT | Performed by: EMERGENCY MEDICINE

## 2024-09-13 PROCEDURE — 250N000011 HC RX IP 250 OP 636: Performed by: EMERGENCY MEDICINE

## 2024-09-13 PROCEDURE — 70450 CT HEAD/BRAIN W/O DYE: CPT

## 2024-09-13 PROCEDURE — 258N000003 HC RX IP 258 OP 636: Performed by: EMERGENCY MEDICINE

## 2024-09-13 PROCEDURE — 84443 ASSAY THYROID STIM HORMONE: CPT | Performed by: EMERGENCY MEDICINE

## 2024-09-13 PROCEDURE — 84484 ASSAY OF TROPONIN QUANT: CPT | Performed by: EMERGENCY MEDICINE

## 2024-09-13 PROCEDURE — 99285 EMERGENCY DEPT VISIT HI MDM: CPT | Mod: 25

## 2024-09-13 PROCEDURE — 99215 OFFICE O/P EST HI 40 MIN: CPT

## 2024-09-13 PROCEDURE — 93005 ELECTROCARDIOGRAM TRACING: CPT

## 2024-09-13 PROCEDURE — 36415 COLL VENOUS BLD VENIPUNCTURE: CPT | Performed by: EMERGENCY MEDICINE

## 2024-09-13 PROCEDURE — 96374 THER/PROPH/DIAG INJ IV PUSH: CPT

## 2024-09-13 PROCEDURE — 96361 HYDRATE IV INFUSION ADD-ON: CPT

## 2024-09-13 PROCEDURE — 80048 BASIC METABOLIC PNL TOTAL CA: CPT | Performed by: EMERGENCY MEDICINE

## 2024-09-13 PROCEDURE — 250N000013 HC RX MED GY IP 250 OP 250 PS 637: Performed by: EMERGENCY MEDICINE

## 2024-09-13 PROCEDURE — 84703 CHORIONIC GONADOTROPIN ASSAY: CPT | Performed by: EMERGENCY MEDICINE

## 2024-09-13 RX ORDER — KETOROLAC TROMETHAMINE 15 MG/ML
15 INJECTION, SOLUTION INTRAMUSCULAR; INTRAVENOUS ONCE
Status: COMPLETED | OUTPATIENT
Start: 2024-09-13 | End: 2024-09-13

## 2024-09-13 RX ORDER — ACETAMINOPHEN 500 MG
1000 TABLET ORAL ONCE
Status: COMPLETED | OUTPATIENT
Start: 2024-09-13 | End: 2024-09-13

## 2024-09-13 RX ADMIN — KETOROLAC TROMETHAMINE 15 MG: 15 INJECTION, SOLUTION INTRAMUSCULAR; INTRAVENOUS at 18:16

## 2024-09-13 RX ADMIN — SODIUM CHLORIDE 1000 ML: 9 INJECTION, SOLUTION INTRAVENOUS at 18:17

## 2024-09-13 RX ADMIN — ACETAMINOPHEN 1000 MG: 500 TABLET, FILM COATED ORAL at 18:12

## 2024-09-13 ASSESSMENT — ENCOUNTER SYMPTOMS: SYNCOPE: 1

## 2024-09-13 ASSESSMENT — PAIN SCALES - GENERAL: PAINLEVEL: SEVERE PAIN (6)

## 2024-09-13 ASSESSMENT — COLUMBIA-SUICIDE SEVERITY RATING SCALE - C-SSRS
1. IN THE PAST MONTH, HAVE YOU WISHED YOU WERE DEAD OR WISHED YOU COULD GO TO SLEEP AND NOT WAKE UP?: NO
2. HAVE YOU ACTUALLY HAD ANY THOUGHTS OF KILLING YOURSELF IN THE PAST MONTH?: NO
6. HAVE YOU EVER DONE ANYTHING, STARTED TO DO ANYTHING, OR PREPARED TO DO ANYTHING TO END YOUR LIFE?: NO

## 2024-09-13 ASSESSMENT — ACTIVITIES OF DAILY LIVING (ADL)
ADLS_ACUITY_SCORE: 35

## 2024-09-13 NOTE — ED PROVIDER NOTES
Emergency Department Note      History of Present Illness   Chief Complaint   Syncope    HPI   Amie Santiago is a 23 year old female who presents due to 3 episodes of syncope this week.  She is here with mother today.  Patient notes that on Saturday last weekend she was at work and when she went to the bathroom after getting off the toilet she stood up and ultimately passed out.  She did hit her head during that syncopal event but denied any significant pain at the time.  On Sunday she developed a headache and received some Excedrin Migraine which helped her headache symptoms but did not fully improve the headache.  The patient has been having a mild headache throughout the entirety of the week and it is never fully resolved.  No prior history of significant headaches.  No known family history although very limited given the patient is adopted.  The patient denies any chest pain or shortness of breath.  Denies abdominal pain, nausea or vomiting and diarrhea.  Denies fever and constitutional symptoms.  On Monday of this week while she was in her room she ultimately stood up off a chair or bed and ultimately felt lightheaded as if she would pass out laid on her bed and ultimately did pass out.  She did not have any injuries associated with that second episode of syncope.  Today when she was standing at work she had an episode of syncope as well and hit the back of her head.  She did note a normal menstrual period approximately 1 and half weeks ago on Monday.  She does not have heavy menstrual cycle.  Denies any personal history of anemia.    Independent Historian   None    Review of External Notes   None    Past Medical History   Medical History and Problem List   Past Medical History:   Diagnosis Date    Hepatitis B immune     Hoarseness     Nasal congestion     Sore throat      Medications   OLANZapine (ZYPREXA) 2.5 MG tablet  OLANZapine (ZYPREXA) 5 MG tablet  paliperidone ER (INVEGA) 9 MG 24 hr  "tablet  vitamin D3 (CHOLECALCIFEROL) 50 mcg (2000 units) tablet      Surgical History   No past surgical history on file.    Physical Exam     Patient Vitals for the past 24 hrs:   BP Temp Temp src Pulse Resp SpO2 Height Weight   09/13/24 2002 91/50 -- -- 80 -- 98 % -- --   09/13/24 1559 91/58 98.3  F (36.8  C) Temporal 94 16 98 % 1.575 m (5' 2\") 59.9 kg (132 lb)     Physical Exam  General: Alert, appears well-developed and well-nourished. Cooperative.     In mild distress  HEENT:  Head:  Atraumatic  Ears:  External ears are normal  Mouth/Throat:  Oropharynx is without erythema or exudate and mucous membranes are moist.   Eyes:   Conjunctivae normal and EOM are normal. No scleral icterus.  CV:  Normal rate, regular rhythm, normal heart sounds and radial pulses are 2+ and symmetric.  No murmur.  Resp:  Breath sounds are clear bilaterally    Non-labored, no retractions or accessory muscle use  GI:  Abdomen is soft, no distension, no tenderness. No rebound or guarding.  No CVA tenderness bilaterally  MS:  Normal range of motion. No edema.    Normal strength in all 4 extremities.     Back atraumatic.    No midline cervical, thoracic, or lumbar tenderness  Skin:  Warm and dry.  No rash or lesions noted.  Neuro:   Alert. Normal strength.  GCS: 15  Psych: Normal mood and affect.    Diagnostics     Lab Results   Labs Ordered and Resulted from Time of ED Arrival to Time of ED Departure   ROUTINE UA WITH MICROSCOPIC REFLEX TO CULTURE - Abnormal       Result Value    Color Urine Yellow      Appearance Urine Slightly Cloudy (*)     Glucose Urine Negative      Bilirubin Urine Negative      Ketones Urine Negative      Specific Gravity Urine 1.026      Blood Urine Moderate (*)     pH Urine 6.5      Protein Albumin Urine 30 (*)     Urobilinogen Urine Normal      Nitrite Urine Negative      Leukocyte Esterase Urine Moderate (*)     Bacteria Urine Few (*)     Mucus Urine Present (*)     RBC Urine 4 (*)     WBC Urine 12 (*)     " Squamous Epithelials Urine 6 (*)     Hyaline Casts Urine 1     CBC WITH PLATELETS AND DIFFERENTIAL - Abnormal    WBC Count 5.8      RBC Count 3.85      Hemoglobin 11.5 (*)     Hematocrit 33.5 (*)     MCV 87      MCH 29.9      MCHC 34.3      RDW 12.7      Platelet Count 183      % Neutrophils 58      % Lymphocytes 33      % Monocytes 8      % Eosinophils 1      % Basophils 0      % Immature Granulocytes 0      NRBCs per 100 WBC 0      Absolute Neutrophils 3.3      Absolute Lymphocytes 1.9      Absolute Monocytes 0.4      Absolute Eosinophils 0.1      Absolute Basophils 0.0      Absolute Immature Granulocytes 0.0      Absolute NRBCs 0.0     BASIC METABOLIC PANEL - Normal    Sodium 139      Potassium 4.0      Chloride 106      Carbon Dioxide (CO2) 23      Anion Gap 10      Urea Nitrogen 8.7      Creatinine 0.71      GFR Estimate >90      Calcium 8.9      Glucose 93     HCG QUALITATIVE PREGNANCY - Normal    hCG Serum Qualitative Negative     TSH WITH FREE T4 REFLEX - Normal    TSH 1.32     TROPONIN T, HIGH SENSITIVITY - Normal    Troponin T, High Sensitivity 7     URINE CULTURE       Imaging   CT Head w/o Contrast   Final Result   IMPRESSION:   1.  No acute intracranial abnormality.          EKG   ECG taken at 1555, ECG read at 1752  Normal sinus rhythm  Normal ECG   No significant change as compared to prior, dated 3/1/18.  Rate 74 bpm. WA interval 116 ms. QRS duration 74 ms. QT/QTc 380/421 ms. P-R-T axes 61 64 50.    Independent Interpretation   None    ED Course      Medications Administered   Medications   sodium chloride 0.9% BOLUS 1,000 mL (0 mLs Intravenous Stopped 9/13/24 2002)   acetaminophen (TYLENOL) tablet 1,000 mg (1,000 mg Oral $Given 9/13/24 1812)   ketorolac (TORADOL) injection 15 mg (15 mg Intravenous $Given 9/13/24 1816)       Procedures   Procedures     Discussion of Management   None    ED Course        Additional Documentation  None    Medical Decision Making / Diagnosis     CMS Diagnoses:  None    MIPS       None    [facility]   Amie Santiago is a 23 year old female who presents with a history and clinical exam consistent with syncope.  While vasovagal syncope or orthostatic hypotension was the most likely etiology given the history of this patient, I considered a broad differential for their syncope today including cardiac arrythmia, ACS, aortic stenosis, HOCM, PE, orthostatic hypotension, drugs, situational, carotid hypersensitivity, seizure, TIA, stroke, vasovagal, etc.   She has no signs of a concerning etiology for syncope at this point.  In addition,she has no known family history of sudden death, no chest pain, no seizure activity or post-ictal period, no murmur, and no signs of orthostasis in the ED, no focal neurologic symptoms.  She did have a headache and given the head injury sustained with syncope today we obtain CT imaging of the head which thankfully showed no evidence of skull fracture or intracranial hemorrhage.  The workup in the ED is negative and the physical exam is re-assurring.  Urinalysis contaminated with squamous epithelium and patient with no urinary complaints, low concern for acute cystitis.  She did have intermittent headache symptoms and was encouraged to continue to use Tylenol, ibuprofen or other over-the-counter headache medications.  Encouraged close follow-up with primary care in the near future as additional outpatient workup may be indicated.  After all questions answered and return precautions understood, discharged home with mother.     Disposition   The patient was discharged.     Diagnosis     ICD-10-CM    1. Syncope and collapse  R55       2. Nonintractable headache, unspecified chronicity pattern, unspecified headache type  R51.9            Discharge Medications   Discharge Medication List as of 9/13/2024  8:05 PM            MD Nella Shaver Scott, MD  09/13/24 4867

## 2024-09-13 NOTE — ED TRIAGE NOTES
Pt states that she had 2 syncopal episodes last week and another one today.  States that she has a HA but otherwise has felt fine.

## 2024-09-13 NOTE — PROGRESS NOTES
Assessment & Plan     (R55) Syncope, unspecified syncope type  (primary encounter diagnosis)  Comment: Acute.  3 episodes of syncope in the last week.  Has hit her head since that time.  Has not been evaluated more emergently since then.  Blood pressure is extremely low in the clinic today with a reading of 85/57.  Patient declines currently being lightheaded or dizzy, but last episode of syncope was today.  Affect flat, but neurological examination otherwise unremarkable.  Considering new onset nature of syncope episodes, and frequent syncope in the last week, I do feel that more rapid and urgent workup is necessary with likely need for head CT and rapid lab workup.  Would not completely rule out cardiac concerns though her rhythm is normal in the clinic today, and she is declining any tachycardia, palpitations, racing heart, or chest pain during episodes.  Considering that we are going into the weekend, and that we do not have the ability for rapid lab workup in the clinic today, educated patient that we could move forward with ordering the scans in an outpatient basis, but this would put her at risk for ongoing episodes of fainting without further understanding as to why.  For this reason, evaluation in the emergency department is largely necessary, and patient and parent attested to agreement and understanding of plan.  They verbalized that they will report to the [facility] emergency department immediately.  Plan: Report to the emergency department immediately for further evaluation    This progress note has been dictated, with use of voice recognition software. Any grammatical, typographical, or context errors are unintentional and inherent to use of voice recognition software.     I spent a total of 17 minutes on the day of the visit.   Time spent by me doing chart review, history and exam, documentation and further activities per the note    CONSULTATION/REFERRAL to emergency department      Subjective  "  Irene is a 23 year old, presenting for the following health issues:  Syncope (She had fainting spells where she lost conciseness 3 times and hit her head. 9/7 at work, 9/9 at home today at work.  She has been having daily headache that started prior to her fainting spells. Elyse/mom is with her today. She has been using tylenol.)        9/13/2024     2:15 PM   Additional Questions   Roomed by Sunni MOTT   Accompanied by Elyse/mom     History of Present Illness       Headaches:   Since the patient's last clinic visit, headaches are: worsened  The patient is getting headaches:  Once a day  She is not able to do normal daily activities when she has a migraine.  The patient is taking the following rescue/relief medications:  Tylenol   Patient states \"I get only a small amount of relief\" from the rescue/relief medications.   The patient is taking the following medications to prevent migraines:  No medications to prevent migraines  In the past 4 weeks, the patient has gone to an Urgent Care or Emergency Room 0 times times due to headaches.   She is taking medications regularly.    Irene is a 23-year-old female with a past medical history significant for left hearing deficit, acute non-intractable headaches, concussion without loss of consciousness, seasonal allergies, ADHD, depression, and low ferritin who presents today accompanied by her mother with concerns for multiple episodes of syncope.  Patient reports that for the first time on Saturday, September 7 she had an episode of syncope while at work.  She then again had an episode of syncope on Monday, September 9, and most recently today September 13.  Patient has never had a history of syncope or seizure-like activity, so this is brand-new for her.  On all episodes she did hit her head, but does not recall hitting it very hard.  In all episodes she was standing up, and did have a feeling as though she was getting lightheaded/having tunnel vision and was going to " "pass out, but outside of this she cannot identify anything that she was specifically doing the same otherwise.  She declines feeling as though she was locking her knees out, getting very hot during the episode, having word finding difficulty or slurred speech, having loss of strength or sensation in her extremities or her face, having a severe headache or changes in hearing, or having a racing heart, feeling as though her heart was skipping a beat, or having chest pain or shortness of breath.  Patient reports that her head is hurt since Saturday with dull frontal aching, and she notes that Tylenol is helpful for this.  Since that time she has had a bit of brain fog, but declines any other neurological deficits.  She is otherwise asymptomatic and feels completely well.  She declines any fever, chills, body aches, persistent lightheadedness or dizziness, changes in her hearing or vision, word finding difficulty or difficulty swallowing, sore throat or chest congestion and cough, abdominal pain, nausea, vomiting, or diarrhea.  She is able to eat and drink, and family feels that she is her normal self outside of this.  She declines any new medications.  Her blood pressure is quite low in the clinic today with a reading of 85/57, and she declines having a known low blood pressure.  She declines any bleeding or abnormal bruising        Review of Systems  Constitutional, HEENT, cardiovascular, pulmonary, gi and gu systems are negative, except as otherwise noted.      Objective    BP (!) 85/57 (BP Location: Left arm, Patient Position: Sitting, Cuff Size: Adult Regular)   Pulse 86   Temp 97.9  F (36.6  C) (Tympanic)   Resp 19   Ht 1.59 m (5' 2.6\")   Wt 59.4 kg (131 lb)   LMP 09/02/2024 (Approximate)   SpO2 97%   BMI 23.50 kg/m    Body mass index is 23.5 kg/m .  Physical Exam   GENERAL: alert and no distress. Pale and fatigued  EYES: Eyes grossly normal to inspection, PERRL and conjunctivae and sclerae normal  HENT: " ear canals and TM's normal, nose and mouth without ulcers or lesions  NECK: no adenopathy, no asymmetry, masses, or scars  RESP: lungs clear to auscultation - no rales, rhonchi or wheezes  CV: regular rate and rhythm, normal S1 S2, no S3 or S4, no murmur, click or rub, no peripheral edema  ABDOMEN: soft, nontender, no hepatosplenomegaly, no masses and bowel sounds normal  MS: no gross musculoskeletal defects noted, no edema  SKIN: no suspicious lesions or rashes  NEURO: Normal strength and tone, mentation intact and speech normal. CN II-XII grossly intact  PSYCH: mentation appears normal, affect flat    Anne-Marie Murry DNP FNP-C  Family Nurse Practitioner - Same Day Provider  [facility] [place]        Signed Electronically by: LIVIA Genao CNP

## 2024-09-15 LAB — BACTERIA UR CULT: NORMAL

## 2024-09-16 ENCOUNTER — VIRTUAL VISIT (OUTPATIENT)
Dept: FAMILY MEDICINE | Facility: CLINIC | Age: 24
End: 2024-09-16
Payer: COMMERCIAL

## 2024-09-16 DIAGNOSIS — R55 SYNCOPE, UNSPECIFIED SYNCOPE TYPE: Primary | ICD-10-CM

## 2024-09-16 LAB
ATRIAL RATE - MUSE: 74 BPM
DIASTOLIC BLOOD PRESSURE - MUSE: NORMAL MMHG
INTERPRETATION ECG - MUSE: NORMAL
P AXIS - MUSE: 61 DEGREES
PR INTERVAL - MUSE: 116 MS
QRS DURATION - MUSE: 74 MS
QT - MUSE: 380 MS
QTC - MUSE: 421 MS
R AXIS - MUSE: 64 DEGREES
SYSTOLIC BLOOD PRESSURE - MUSE: NORMAL MMHG
T AXIS - MUSE: 50 DEGREES
VENTRICULAR RATE- MUSE: 74 BPM

## 2024-09-16 PROCEDURE — 99214 OFFICE O/P EST MOD 30 MIN: CPT | Mod: 95

## 2024-09-16 NOTE — PROGRESS NOTES
Irene is a 23 year old who is being evaluated via a billable video visit.    How would you like to obtain your AVS? MyChart  If the video visit is dropped, the invitation should be resent by: Send to e-mail at: [email or web address]  Will anyone else be joining your video visit? Yes: Lizy (mom). How would they like to receive their invitation? Text to cell phone: [phone]      Assessment & Plan     (R55) Syncope, unspecified syncope type  (primary encounter diagnosis)  Comment: Acute and stable.  Acute concern complicated by lack of determination as to what is causing these episodes.  No findings via video visit that would warrant need for the immediate medical attention.  Neurological status largely unremarkable via video call.  No ongoing episodes of syncope, and patient feels completely well otherwise.  Workup in the emergency department was very reassuring.  Still difficult to determine what might be causing the syncope, there is a possibility that low blood sugar may be contributing.  Would like her to begin by ensuring that she is eating within the first 3 hours of waking up in the morning, and specifically before going to work or starting daily activity.  Will add on her echocardiogram to ensure cardiac stability moving forward, and after this it may be justifiable to continue to monitor this symptom moving forward specifically after adding in increase nutritional intake in the morning. Offered education and interpretation of lab results done in the emergency department. education given on return to clinic instructions as well as alarm signs that would require the need for immediate medical attention.  Patient attested to understanding.  Plan: Echocardiogram Complete      This progress note has been dictated, with use of voice recognition software. Any grammatical, typographical, or context errors are unintentional and inherent to use of voice recognition software.     Ordering of each unique test  No LOS  data to display   Time spent by me doing chart review, history and exam, documentation and further activities per the note    MED REC REQUIRED  Post Medication Reconciliation Status:       FUTURE APPOINTMENTS:       -Schedule an appointment for an echocardiogram, and we will discuss interval for follow-up based on these results       - Follow-up for annual visit or as needed  Patient Instructions   Empiric you are feeling better.  As we discussed, considering that all of your lab work looks completely normal, it is a little hard to determine what might have caused her symptoms.  I do think it is a good idea to do 1 more test at least to make sure that your heart is healthy, and that your heart is not the cause of your ongoing fainting.  This is a test called an echocardiogram, and I have listed the number below that I would like you to call to schedule this at your earliest convenience.  Once your results are back we will be better able to safely discussed you returning to work.    Echo Scheduling - [phone]    Additionally, we talked about how low blood sugar may be the cause of your fainting, especially if you are not eating until lunchtime.  It sounds like you had a bit of nausea if you eat too early in the morning, but I would encourage you to eat within the first 1 to 3 hours of waking up, and especially before you begin your workday.  This will make sure that you are body has enough nutrition to sustain the ongoing energy that is required to do your job.  If your blood sugar is going too low, this could be the cause of your fainting episodes as well.    It is important to seek immediate medical attention if you are having symptoms of chest pain, fever, shortness of breath, palpitations, additional fainting episodes, or any changes in your mental status.      Filippo Bonilla is a 23 year old, presenting for the following health issues:  ER F/U (Syncope follow up - seen in the ED on 9/13/24 )      Video  Start Time:  2:25    HPI     Irene is a 23-year-old female with a past medical history significant for left-sided hearing deficit, seasonal allergic rhinitis, ADHD, depression, and low ferritin who presents today to follow-up after being seen in the emergency department after multiple episodes of syncope.  I saw patient in the clinic on Friday, September 13 at which time she has sustained 3 episodes of syncope in 3 days.  She elected to be seen in the emergency department considering that she had hit her head during a number of these episodes.  Workup in the emergency department was largely unremarkable with a stable EKG and normal sinus rhythm, head CT that helps to rule out concerns for acute bleed, and completely normal lab workup including urinalysis, troponin, TSH, BMP, and CBC.  Patient presents to the video visit today feeling much better.  She has not had any additional episodes of syncope, and notes that her previously present and persistent headache has not resolved.  She notes that one of her concerns is that her blood sugar may be very low, or that she is very dehydrated, as she oftentimes goes from before dinnertime until lunchtime without having anything to eat or drink.  All of the episodes of syncope happened around 11 AM, and she notes that it is not uncommon at all for her to begin her workday without having eaten or drink anything at all.  She declines any new onset neurological symptoms including headache, blurry or double vision, lightheadedness or dizziness, confusion, changes in her hearing, difficulty swallowing, word finding difficulty, or changes in the strength or sensation of her face or extremities.  She feels completely well and declines any acute illness symptoms including chest congestion, cough, sore throat, sinus congestion, ear pain, fever, chills, or bodyaches.  She declines any GI upset, nausea, vomiting, or diarrhea.        Review of Systems  Constitutional, HEENT,  cardiovascular, pulmonary, gi and gu systems are negative, except as otherwise noted.      Objective           Vitals:  No vitals were obtained today due to virtual visit.    Physical Exam   GENERAL: alert and no distress  EYES: Eyes grossly normal to inspection.  No discharge or erythema, or obvious scleral/conjunctival abnormalities.  RESP: No audible wheeze, cough, or visible cyanosis.    SKIN: Visible skin clear. No significant rash, abnormal pigmentation or lesions.  NEURO: Cranial nerves grossly intact.  Mentation and speech appropriate for age.  PSYCH: Appropriate affect, tone, and pace of words    Anne-Marie Murry DNP FNP-C  Family Nurse Practitioner - Same Day Provider  [facility] [place]      Video-Visit Details    Type of service:  Video Visit   Video End Time:2:40 PM  Originating Location (pt. Location): Home    Distant Location (provider location):  On-site  Platform used for Video Visit: Saul  Signed Electronically by: LIVIA Genao CNP

## 2024-09-16 NOTE — PATIENT INSTRUCTIONS
Empiric you are feeling better.  As we discussed, considering that all of your lab work looks completely normal, it is a little hard to determine what might have caused her symptoms.  I do think it is a good idea to do 1 more test at least to make sure that your heart is healthy, and that your heart is not the cause of your ongoing fainting.  This is a test called an echocardiogram, and I have listed the number below that I would like you to call to schedule this at your earliest convenience.  Once your results are back we will be better able to safely discussed you returning to work.    Echo Scheduling - [phone]    Additionally, we talked about how low blood sugar may be the cause of your fainting, especially if you are not eating until lunchtime.  It sounds like you had a bit of nausea if you eat too early in the morning, but I would encourage you to eat within the first 1 to 3 hours of waking up, and especially before you begin your workday.  This will make sure that you are body has enough nutrition to sustain the ongoing energy that is required to do your job.  If your blood sugar is going too low, this could be the cause of your fainting episodes as well.    It is important to seek immediate medical attention if you are having symptoms of chest pain, fever, shortness of breath, palpitations, additional fainting episodes, or any changes in your mental status.

## 2024-09-19 DIAGNOSIS — E55.9 VITAMIN D DEFICIENCY: ICD-10-CM

## 2024-09-19 RX ORDER — CHOLECALCIFEROL (VITAMIN D3) 50 MCG
1 TABLET ORAL DAILY
Qty: 28 TABLET | Refills: 0 | Status: SHIPPED | OUTPATIENT
Start: 2024-09-19

## 2024-09-19 NOTE — TELEPHONE ENCOUNTER
"Date of Last Office Visit: 5/23/2024  [facility]    Nabil Soares MD  Psychiatry       RTC: 6 weeks  No shows: 0  Cancellations: 0  Date of Next Office Visit:    10/3/2024 8:40 AM (60 min)  Tomasa   Arrive by:  8:25 AM   PSYCHOSIS RETURN   URPSY ([facility])   Ariadna Gamez MD     ------------------------------    Medication requested:     Disp Refills Start End TUAN   vitamin D3 (CHOLECALCIFEROL) 50 mcg (2000 units) tablet 28 tablet 0 8/23/2024 -- No   Sig - Route: Take 1 tablet (50 mcg) by mouth daily. - Oral     ------------------------------  From last visit note:   \"Continue Vitamin D 50 mcg PO Qday. \"       Refill decision: Medication refilled per protocol.  Refill decision: Refill pended and routed to the provider for review/determination due to the following criteria not met:     Medication unable to be refilled by RN due to criteria not met as indicated.                 []Eligibility - not seen in the last year              []Supervision - no future appointment              []Compliance - no shows, cancellations or lapse in therapy              []Verification - order discrepancy              []Controlled medication              []Medication not included in policy              []90-day supply request              []Other:                        "

## 2024-09-23 ENCOUNTER — HOSPITAL ENCOUNTER (OUTPATIENT)
Dept: CARDIOLOGY | Facility: CLINIC | Age: 24
Discharge: HOME | End: 2024-09-23
Payer: COMMERCIAL

## 2024-09-23 DIAGNOSIS — R55 SYNCOPE, UNSPECIFIED SYNCOPE TYPE: ICD-10-CM

## 2024-09-23 DIAGNOSIS — R55 SYNCOPE, UNSPECIFIED SYNCOPE TYPE: Primary | ICD-10-CM

## 2024-09-23 DIAGNOSIS — F29 PSYCHOSIS, UNSPECIFIED PSYCHOSIS TYPE (H): ICD-10-CM

## 2024-09-23 PROCEDURE — 93306 TTE W/DOPPLER COMPLETE: CPT

## 2024-09-23 PROCEDURE — 93306 TTE W/DOPPLER COMPLETE: CPT | Mod: 26 | Performed by: INTERNAL MEDICINE

## 2024-09-23 RX ORDER — PALIPERIDONE 9 MG/1
9 TABLET, EXTENDED RELEASE ORAL AT BEDTIME
Qty: 30 TABLET | Refills: 0 | Status: SHIPPED | OUTPATIENT
Start: 2024-09-23 | End: 2024-10-03

## 2024-09-23 RX ORDER — OLANZAPINE 5 MG/1
5 TABLET ORAL AT BEDTIME
Qty: 30 TABLET | Refills: 0 | Status: SHIPPED | OUTPATIENT
Start: 2024-09-23 | End: 2024-10-03

## 2024-09-23 NOTE — TELEPHONE ENCOUNTER
"Date of Last Office Visit: 5/23/2024  [facility]    Nabil Soares MD  Psychiatry       RTC: 6 weeks  No shows: 0  Cancellations: 0  Date of Next Office Visit:    10/3/2024 8:40 AM (60 min)  Tomasa   Arrive by:  8:25 AM   PSYCHOSIS RETURN   URPSY ([facility])   Ariadna Gamez MD     ------------------------------    Medication requested:     Disp Refills Start End TUAN   OLANZapine (ZYPREXA) 5 MG tablet 30 tablet 1 8/14/2024 -- No   Sig - Route: Take 1 tablet (5 mg) by mouth at bedtime - Oral     ------------------------------  From last visit note:   \"-Continue olanzapine 10 mg PO at bedtime AND in 7 days from today, decrease Olanzapine to 5 mg PO at bedtime.  Pending outreach call in 4 weeks from now, will consider further decreasing olanzapine to 2.5 mg PO at bedtime until Irene meets with the next resident in July. \"         Refill decision: Refill pended and routed to the provider for review/determination due to the following criteria not met:     Medication unable to be refilled by RN due to criteria not met as indicated.                 []Eligibility - not seen in the last year              []Supervision - no future appointment              []Compliance - no shows, cancellations or lapse in therapy              []Verification - order discrepancy              []Controlled medication              []Medication not included in policy              []90-day supply request              []Other:   Date of Last Office Visit:   5/23/2024  [facility]  Nabil Soares MD  Psychiatry    RTC: 6 weeks  No shows: 0  Cancellations: 0  Date of Next Office Visit:    10/3/2024 8:40 AM (60 min)  Tomasa   Arrive by:  8:25 AM   PSYCHOSIS RETURN   URPSY ([facility])   Ariadna Gamez MD     ------------------------------    Medication requested:     Disp Refills Start End TUAN   paliperidone ER (INVEGA) 9 MG 24 hr tablet 30 " "tablet 1 8/14/2024 -- No   Sig - Route: Take 1 tablet (9 mg) by mouth at bedtime - Oral     ------------------------------  From last visit note:   \"-Increase Invega to 9 mg PO at bed time \"       Refill decision: Refill pended and routed to the provider for review/determination due to the following criteria not met:     Medication unable to be refilled by RN due to criteria not met as indicated.                 []Eligibility - not seen in the last year              []Supervision - no future appointment              []Compliance - no shows, cancellations or lapse in therapy              []Verification - order discrepancy              [x]Controlled medication              []Medication not included in policy              []90-day supply request              []Other:                                           "

## 2024-09-26 ENCOUNTER — TELEPHONE (OUTPATIENT)
Dept: FAMILY MEDICINE | Facility: CLINIC | Age: 24
End: 2024-09-26

## 2024-09-26 NOTE — TELEPHONE ENCOUNTER
General Call    Contacts       Contact Date/Time Type Contact Phone/Fax    09/26/2024 02:00 PM CDT Phone (Incoming) Irene Santiago (Self) [phone] (M)          Reason for Call:  letter to return to work from Anne-Marie Murry NP    What are your questions or concerns:  Patient saw Anne-Marie and requested a note to return to work-she requested it on 9/24/24 [employer].  She did not get one yet so is calling back.  They will not let her come back to work until she has a note.She also reports she has a Neurology appointment on 1/20/25    Please send through [employer] once complete.    Patient reports she should have no restrictions in going back to work.    Please send through [employer] for patient.      Date of last appointment with provider: 9/16/24    Could we send this information to you in [employer] or would you prefer to receive a phone call?:   Patient would like to be contacted via [employer]

## 2024-10-03 ENCOUNTER — TELEPHONE (OUTPATIENT)
Dept: PSYCHIATRY | Facility: CLINIC | Age: 24
End: 2024-10-03
Payer: COMMERCIAL

## 2024-10-03 ENCOUNTER — OFFICE VISIT (OUTPATIENT)
Dept: PSYCHIATRY | Facility: CLINIC | Age: 24
End: 2024-10-03
Attending: PSYCHIATRY & NEUROLOGY
Payer: COMMERCIAL

## 2024-10-03 VITALS
BODY MASS INDEX: 23.81 KG/M2 | WEIGHT: 130.2 LBS | DIASTOLIC BLOOD PRESSURE: 77 MMHG | SYSTOLIC BLOOD PRESSURE: 109 MMHG | HEART RATE: 99 BPM

## 2024-10-03 DIAGNOSIS — F12.21 CANNABIS USE DISORDER, MODERATE, IN EARLY REMISSION (H): Primary | ICD-10-CM

## 2024-10-03 DIAGNOSIS — F90.9 ATTENTION DEFICIT HYPERACTIVITY DISORDER (ADHD), UNSPECIFIED ADHD TYPE: ICD-10-CM

## 2024-10-03 DIAGNOSIS — F29 PSYCHOSIS, UNSPECIFIED PSYCHOSIS TYPE (H): ICD-10-CM

## 2024-10-03 DIAGNOSIS — F33.42 MAJOR DEPRESSIVE DISORDER, RECURRENT EPISODE, IN FULL REMISSION (H): ICD-10-CM

## 2024-10-03 PROCEDURE — 90792 PSYCH DIAG EVAL W/MED SRVCS: CPT | Mod: GC

## 2024-10-03 RX ORDER — OLANZAPINE 5 MG/1
2.5 TABLET ORAL AT BEDTIME
Qty: 30 TABLET | Refills: 0 | Status: SHIPPED | OUTPATIENT
Start: 2024-10-03

## 2024-10-03 ASSESSMENT — PAIN SCALES - GENERAL: PAINLEVEL: NO PAIN (0)

## 2024-10-03 NOTE — TELEPHONE ENCOUNTER
Writer notified by Dr. Huber to confirm if [facility] is able to administer JAY.     Order:   Day 1: 234mg Invega Sustenna Injection (Loading dose 1)              -Day 8: 156mg Invega Sustenna Injection (Loading dose 2)              -28 days later and every 28 days after than, you should receive the 156mg Invega Sustenna Injection. This is comparable to the 9mg of the daily medication that you are taking.     Reached out to [facility] at [phone] and was notified that patient is not on their panel.     Reached out to patient to confirm her current residence. No answer at number provided. LVM, requesting a call back. Clinic number provided.

## 2024-10-03 NOTE — PROGRESS NOTES
"     [facility]  TRANSFER of CARE DIAGNOSTIC ASSESSMENT  Psychosis Treatment Team       CARE TEAM:    Therapist- Family education program with Jose Ventura.   Other (family therapy) Jonna Murphy at [facility] in [place]   Legal: Civil Commitment . Type of commitment: MI  Date of commitment: 10/30/23  Date of commitment expiration: 04/26/24. Order for continued commitment on 4/23/24.   Harris: Yes.   Medications authorized by Harris order: Haloperidol [HALDOL], Olanzapine [ZYPREXA], Paliperidone [INVEGA], and Risperidone [RISPERDAL].  Cespedes Martinez: No.   : Deedee Flores, [phone]      Amie EDA Santiago, who prefers being addressed as \"Irene\" is a 23 year old person who uses the pronouns she, her, hers.                   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 She is presenting for psychiatric follow up and transfer of care between resident providers. She last met with Dr. Soares on 5/23/24. They have been working on transitioning her from Olanzapine and Invega to Invega JAY, with the goal to ultimately get on Trinza. This is in the context of decompensating psychosis in 10/2023 resulting in [facility] admission and commitment with nelson (see pertinent background for more info). Irene has notably had a history of psychiatric hospitalizations prior to this as well. They have also been working on cutting down on her cannabis use, which may have been a contributing factor to her psychosis.     Today, Irene reports being stable. She is denying and depression, anxiety or " psychosis symptoms. She has moved to more permanent housing and she really likes it where she is at. She has also been adherent with her medications. We discussed transitioning her to Invega Sustenna (156 maintenance dose) and decreasing her Zyprexa to 2.5mg and ultimately stopping Zyprexa if she continues to do well in a couple of weeks. Also plan to stop PO Invega once she starts Sustenna . Of note, she has had 3 episodes of syncope over the course of the previous month (though all occurred within the course of a few days) . She said one occurred while she was already standing at work and another occurred when she was going from a sitting to standing position. Per chart review, it seems there was no clear etiology of the syncopal episodes. It is suggested that perhaps her blood sugar was low and/or she may have been dehydrated when these episodes occurred. EKG, echo unremarkable. She does have an appointment with Neurology coming up. It is possible her antipsychotics may be contributing from an orthostasis standpoint, but these episodes do not appear to be recurring. We will continue to monitor closely with the transition to JAY and we also encouraged Irene to stay hydrated, eat in the morning and move at work when possible so she is not standing in the same place for long periods of time.   Of note, Irene has cut out her cannabis use completely with her last use being 8 months. She said that she can only access the car when she is not smoking so that has been her motivation.      Irene does not appear to be at imminent risk of harming herself or anyone else at this visit.      Future Considerations:  *Per Dr. Soares's previous psychiatry notes-will continue to update accordngly*   -Goal is  transition to JAY monotherapy (per authorized Harris order) due to concerns for medication adherence and multiple ED visits/recent hospitalizations.   -Continue offering CD treatment/resources  -Coordinate care as able/needed.    -Coordinate an outpatient  higher level of care as needed     Psychotropic Drug Interactions:  [PSYCHCLINICDDI]   Agents With Seizure Threshold Lowering Potential: Zyprexa, Invega  Management: limit med redundancy, routine monitoring, and patient aware of risks.     MNPMP was not checked today: will be checked next visit     Risk Statements:   Treatment Risk- Risks, benefits, alternatives and potential adverse effects have been discussed and are understood.   Safety Risk-Irene did not appear to be an imminent safety risk to self or others.    PLAN:     *Prescriptions sent electronically to [id number] - [place], [place] - [address] AT [address] [phone]. Injections to [facility]. *     1) Medications:     -We will start the monthly long acting injectables. We will send this to [facility]. Injectable schedule is as follows:   -Day 1: 234mg Invega Sustenna Injection (Loading dose 1)   -Day 8: 156mg Invega Sustenna Injection (Loading dose 2)   -28 days later and every 28 days after than, you should receive the 156mg Invega Sustenna Injection. This is comparable to the 9mg of the daily medication that you are taking.   -Plan to stop PO Invega upon getting the first injection.   -Decrease Olanzapine po from 5mg to 2.5mg . We will have a nurse check in with you in about 2 weeks. If mental health continues to remain stable, then we will plan to stop the Olanzapine completely.   -Keep PRN Olanzapine 2.5-5mg daily as needed for breakthrough psychosis symptoms.   -Continue Vitamin D 50 mcg PO Qday.      Goal is to cross-titrate olanzapine to Ivega PO>Invega Sustenna and eventually monotherapy with Invega Trinza JAY.     2) Psychotherapy:   As indicated. Therapy with Jonna Murphy at [facility] in [place]. Hasn't seen in a few months. Agreeable to going back.      3) Next due:  Labs- Last obtained: October 2023 (BMP, CBC, TSH, lipid panel, HgA1c, WNL except very  "mild elevation of AST) Next due, routine AP labs October 2024.    EKG- Routine monitoring is not indicated for current psychotropic medication regimen. Determine next due.  Rating scales- AIMS: 1/25/24, Score = 0. Next due Jan/2025     4) Referrals: none     5) Other:   -Discussed staying hydrated, eating in the morning, not locking knees, shifting weight around given recent syncopal episodes.    -Neurology appointment in a couple weeks.    -Encouraged Irene to let us know if syncopal episodes keep happening especially as we are prescribing orthostatic medications.       6) Follow-up: Return to clinic in 6 weeks (or earlier if needed). Nursing check in a couple of weeks.                    Pertinent Background  History of multiple psychiatric admissions. Last hospital admission was in 10/2023 at [facility] due to worsening psychotic symptoms and MI cilvil commitment with Harris order was pursued and granted. Irene was discharged to an IRTS facility after this. She has notably had multiple ED visits historically (and before this admission) where she was found to be intoxicated on alcohol and with cannabis. Concern that cannabis may have been contributing to psychosis.     She has since been working with Dr. Soares, during which olanzapine was decreased and Invega initiated and dose slowsly increased with goal to get off olanzapine and eventually on to invega systenna and ultimately invega trinza JAY.     Please see Dr. Soares 5/23/2024 visit for further details on clinical course since Irene's discharge from [facility].  \"  Pertinent Items Include: Psychosis, aggression, substance use: cannabis and psych hosp, MI commitment with Harris order. \"                 History of Present Illness   Irene initially presented with her , Ana. Ana did leave appt earlier. Irene was ok with Ana being present.   HPI/Interval Events:   Irene said that she hasn't been working or 2 weeks because she \"passed out.\" She said that " she was already standing. This happened one time before when she was getting up from sitting down. Outside of these two instances, this has not happened and denied this happening historically-where she has felt lightheaded. Denied any other associated symptoms.  She said that she has been feeling ok now. Irene said thay she has been living in adult foster care with [facility] residents and that she loves it there. There are 4 other people living there. She said that things have been stable since last October.     Psych ROS:   -Mood: Good  -Depression: No  -SI/HI: No     -Denies history of suicide attempts  -Paranoia: No  -AVH: None recently. It's been a year.    -Command AH: No.  -Anxiety: No  -Trauma Sxs: Denies.   -Denies nightmares or flashbacks    Therapy:Hasn't gone in 3-4 months.    -Open to going back to therapy   -Did do the family therapy. Was alright. Things going ok with mom.     Medications, Side Effects and Medical ROS:   -Taking Invega 9mg since May.   -Taking 5mg Olanzapine scheduled . at bedtime.    -Hasn't used the 2.5-5mg prn at all.   -Bowel movements: 1x/ week.   -No EPS.   -Sleep: 13 hours. Feels like this is not too much. Doesn't feel groggy in the morning.   -Eating: Something small at breakfast, then lunch and dinner.   -We discussed staying hydrated, eating more in the morning, and trying to move around to minimize instances of syncope particularly as she stands frequently during her job. .      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    -40 hours a week.    -Currently on a break from work due to 2x episodes of lightheadedness (one of which was at work).    -Housing: Has permanent housing. Adult Foster Care with [facility] residents. Loves it there. 4 people live there.   -Interests: Draw, paint, listen to music, go on walks.     Pertinent Substance Use:    [Last updated 10/03/24]  -Cannabis Use: Doesn't use anymore. It's been 8 months. Motivation: to  "drive car.    -cravings: sometimes. Just wanting to smoke.   -Alcohol Use: Sometimes. 1x/month. 2 drinks/one sitting.   -Opioids:No  -Stimulants: None  -denies other substance use, vitamins, supplements.                 Physical Exam  (Vitals Only)    /77   Pulse 99   Wt 59.1 kg (130 lb 3.2 oz)   LMP 09/02/2024 (Approximate)   BMI 23.81 kg/m    Pulse Readings from Last 3 Encounters:   10/03/24 99   09/13/24 80   09/13/24 86     Wt Readings from Last 3 Encounters:   10/03/24 59.1 kg (130 lb 3.2 oz)   09/13/24 59.9 kg (132 lb)   09/13/24 59.4 kg (131 lb)     BP Readings from Last 3 Encounters:   10/03/24 109/77   09/13/24 91/50   09/13/24 (!) 85/57                      Mental Status Exam    Alertness: alert  and oriented  Appearance: well groomed  Behavior/Demeanor: pleasant, calm, and guarded, with intermittent eye contact.   Speech: normal and regular rate and rhythm  Language: intact and no obvious problem  Psychomotor: normal or unremarkable  Mood:  \"things have been stable\"  Affect: full range for topics discussed, congruent to: mood- yes, content- yes  Thought Process/Associations: linear, goal directed   Thought Content:  Reports none;  Denies suicidal & violent ideation   Perception:  Reports none;  Denies auditory hallucinations, visual hallucinations, paranoia; does not appear to be responding to internal stimuli  Insight: adequate and fair  Judgment: good  Cognition: does  appear grossly intact; formal cognitive testing was not done  Gait and Station: unremarkable                 Family History     Will discuss at future visits.                   Past Psychiatric History   Per Dr. Soares's 11/2023 Transfer of Care Note-no notable updates since*  SIB- NA  Suicide Attempt [#, most recent]- No   Suicidal Ideation Hx- No   Psychosis Hx- Yes  Eating Disorder Hx- No   Other- NA  Psych Hosp [#, most recent]- Yes, 5, most recent [facility] [facility] due to worsening psychosis.  Commitment:  Civil " Commitment . Type of commitment: MI  Date of commitment: 10/30/23  Date of commitment expiration: 04/26/24.  Harris: Yes.   Medications authorized by Steven order: Haloperidol [HALDOL], Olanzapine [ZYPREXA], Paliperidone [INVEGA], and Risperidone [RISPERDAL]   Price Martinez: No.   : Deedee Flores, [phone]   TMS/ECT- No   Outpatient Programs - No   Other - NA  Outpatient Programs [Day treatment, DBT, eating disorder tx, etc]: Yes: Current IRTS      SUBSTANCE USE HISTORY {  Past Use: Yes: Cannabis and ETOH use.   Treatment [#, most recent]: No   Medical Consequences: No   Legal Consequences: No                      Past Psychotropic Medication Trials     Medication Max Dose (mg) Dates / Duration Helpful? DC Reason / Adverse Effects?   lexapro 20 1 year.Discont  May 2023  N Has not helped at all.   olanzapine  20 mg    Y Dose increased from 10 to 20 mg daily dose during Oct/2023 hospitalization    Prozac   2021   Somewhat helpful, but caused mood instability, vivid dreams    Wellbutrin  150 mg   Discontinued October 2023  N                      Past Medical History     Patient Active Problem List   Diagnosis    Adopted 2/07 from [facility]    ADHD, predominantly inattentive type    Seasonal allergic rhinitis    Hearing deficit, left    Low ferritin    Acute nonintractable headache, unspecified headache type    Major depressive disorder with single episode, in full remission (H)    Discoloration of eye    Concussion without loss of consciousness, initial encounter    Recurrent major depressive disorder (H)                     Allergies     Gluten meal                Medications     Current Outpatient Medications   Medication Sig Dispense Refill    OLANZapine (ZYPREXA) 2.5 MG tablet Take 1-2 tablets (2.5-5 mg) by mouth daily as needed (for breakthrough hallucinations and anxiety associated with hallucinations.) 60 tablet 0    OLANZapine (ZYPREXA) 5 MG tablet Take 0.5 tablets (2.5 mg) by mouth at  bedtime. 30 tablet 0    paliperidone (INVEGA SUSTENNA) 156 MG/ML JACKY injection Inject 1 mL (156 mg) into the muscle once for 1 dose. Administer on Day 8 (7 days after the 234mg injection). 1 mL 0    paliperidone (INVEGA SUSTENNA) 156 MG/ML JACKY injection Inject 1 mL (156 mg) into the muscle every 28 days. First dose to be administered 28 days after the last 156mg dose. Administer every 28 days after that. 1 mL 11    paliperidone (INVEGA SUSTENNA) 234 MG/1.5ML JACKY Inject 1.5 mLs (234 mg) into the muscle once for 1 dose. Administer on Day 1. 1.5 mL 0    vitamin D3 (CHOLECALCIFEROL) 50 mcg (2000 units) tablet Take 1 tablet (50 mcg) by mouth daily. 28 tablet 0                     Data         2023     1:29 PM 3/7/2024     2:25 PM 10/3/2024     8:36 AM   PROMIS-10 Total Score w/o Sub Scores   PROMIS TOTAL - SUBSCORES 35 34 36         3/3/2023     1:32 PM   CAGE-AID Total Score   Total Score 0    0   Total Score MyChart 0 (A total score of 2 or greater is considered clinically significant)         2024     2:10 PM 2024     3:25 PM 10/3/2024     8:35 AM   PHQ-9 SCORE   PHQ-9 Total Score MyChart 0 0 0   PHQ-9 Total Score 0 0 0         2019     1:42 PM 2021     3:30 PM   DAMIÁN-7 SCORE   Total Score 1 0       Liver/Kidney Function, TSH Metabolic Blood counts   Recent Labs   Lab Test 24  1417   AST  --  23  --    ALT  --  11  --    ALKPHOS  --  82  --    CR 0.71  --  0.83     Recent Labs   Lab Test 24   TSH 1.32    Recent Labs   Lab Test 24   CHOL 151   TRIG 60   LDL 88   HDL 51     Recent Labs   Lab Test 24   A1C 5.1     Recent Labs   Lab Test 24   GLC 93    Recent Labs   Lab Test 24   WBC 5.8   HGB 11.5*   HCT 33.5*   MCV 87            EK2024: Qtc: 421    Level of Medical Decision Making:   - At least 1 chronic problem that is not stable  - Engaged in prescription drug management during  visit (discussed any medication benefits, side effects, alternatives, etc.)      PROVIDER:   Ariadna Gamez MD  PGY-3 Psychiatry  [facility]     Patient staffed in clinic with Dr. Huber who will sign the note.  Supervisor is Dr. Huber.

## 2024-10-03 NOTE — TELEPHONE ENCOUNTER
Received a call from Lilia ([phone]) from [facility] house at [facility] location. She confirmed that there is a nurse who can administer the JAY. The nurse is requesting orders be faxed to her. The nurse also wants to be able to give injection two days before or after the due date. Writer agreed to reach out to provider for approval. Letter drafted and routed to provider to review.     Fax number: [phone]

## 2024-10-03 NOTE — PATIENT INSTRUCTIONS
Medication Changes:  -Decrease oral Olanzapine from 5mg to 2.5mg.    -We will have a nurse call and check in with you in about 2 weeks. If mental health continues to remain stable, then we will plan to stop the Olanzapine completely.    -We will keep the as needed Olanzapine for any breakthough symptoms.     -We will start the monthly long acting injectables. We will send this to [facility]. Injectable schedule is as follows;   -Day 1: 234mg Invega Sustenna Injection (Loading dose 1)   -Day 8: 156mg Invega Sustenna Injection (Loading dose 2)   -28 days later and every 28 days after than, you should receive the 156mg Invega Sustenna Injection. This is comparable to the 9mg of the daily medication that you are taking.     -When you get the first injection, please stop taking the oral Invega.     Follow Up: 6 weeks.

## 2024-10-04 NOTE — TELEPHONE ENCOUNTER
Ariadna Gamez MD Patel, Radhika, RN Hi Radhika,    That is perfect. Thank you.    Ariadna    Follow up:     - Letter with med changes faxed as requested via [employer] routing   - Lilia notified of the faxed letter

## 2024-10-17 ENCOUNTER — MYC MEDICAL ADVICE (OUTPATIENT)
Dept: PSYCHIATRY | Facility: CLINIC | Age: 24
End: 2024-10-17
Payer: COMMERCIAL

## 2024-10-17 DIAGNOSIS — E55.9 VITAMIN D DEFICIENCY: ICD-10-CM

## 2024-10-21 DIAGNOSIS — F29 PSYCHOSIS, UNSPECIFIED PSYCHOSIS TYPE (H): ICD-10-CM

## 2024-10-21 RX ORDER — OLANZAPINE 2.5 MG/1
2.5-5 TABLET, FILM COATED ORAL DAILY PRN
Qty: 60 TABLET | Refills: 1 | Status: SHIPPED | OUTPATIENT
Start: 2024-10-21

## 2024-10-21 RX ORDER — CHOLECALCIFEROL (VITAMIN D3) 50 MCG
1 TABLET ORAL DAILY
Qty: 45 TABLET | Refills: 0 | Status: SHIPPED | OUTPATIENT
Start: 2024-10-21

## 2024-10-21 NOTE — TELEPHONE ENCOUNTER
"Date of Last Office Visit: 10/3/2024  [facility] Mental Health & Addiction [facility] [[email or web address]]      RTC: Return in about 6 weeks (around 11/14/2024) for with me, in person.   No shows: 0  Cancellations: 0  Date of Next Office Visit:    11/21/2024 10:40 AM (30 min)  Tomasa   Arrive by: 10:25 AM   PSYCHOSIS RETURN   URPSY ([facility])   Ariadna Gamez MD     ------------------------------    Medication requested:    vitamin D3 (CHOLECALCIFEROL) 50 mcg (2000 units) tablet 28 tablet 0 9/19/2024 -- No   Sig - Route: Take 1 tablet (50 mcg) by mouth daily. - Oral     ------------------------------  From last visit note:   \"N/a\"     Refill Decision:    []Medication refilled per  Medication Refill in Ambulatory Care  policy.         [] Supervision: no future appointment scheduled. Scheduling has been notified to contact the pt for appointment.    [x]Medication unable to be refilled by RN due to criteria not met as indicated below:          [] Eligibility - Pt not seen within past 12 months, no future appointment       [] Supervision: no future appointment scheduled. Scheduling has been notified to contact the pt for appointment.       [] Compliance - lapse in therapy/gap in refills; No Shows; Cancellations       [] Verification - order discrepancy, clarification needed, modification needed       [] Melida refills given. Pt did not follow-up, pended to care team for decision       [] Controlled medication       [] Medication not included in refill protocol policy       [] Medication is Reported/Historical       [] Medication not active on Pt's med list       [] > 30-day supply request       [] Advanced refill request: > 7 days before refill date       [] Review is needed: new med; med adjusted ? 30 days; Safety Alert; requires lab monitoring       [x] Last visit treatment plan \"Open/Pended/Unsigned\" - routing for review.       [] OTHER:                        "

## 2024-10-21 NOTE — TELEPHONE ENCOUNTER
"Date of Last Office Visit: 10/3/2024  [facility] Mental Health & Addiction [facility]      RTC: Return in about 6 weeks (around 11/14/2024) for with me, in person.   No shows: 0  Cancellations: 0  Date of Next Office Visit:    11/21/2024 10:40 AM (30 min)  Tomasa   Arrive by: 10:25 AM   PSYCHOSIS RETURN   URPSY ([facility])   Ariadna Gamez MD     ------------------------------    Medication requested:    OLANZapine (ZYPREXA) 2.5 MG tablet 60 tablet 0 8/14/2024 -- No   Sig - Route: Take 1-2 tablets (2.5-5 mg) by mouth daily as needed (for breakthrough hallucinations and anxiety associated with hallucinations.) - Oral     ------------------------------  From last visit note:   \"N/a\"     Refill Decision:    []Medication refilled per  Medication Refill in Ambulatory Care  policy.         [] Supervision: no future appointment scheduled. Scheduling has been notified to contact the pt for appointment.    [x]Medication unable to be refilled by RN due to criteria not met as indicated below:          [] Eligibility - Pt not seen within past 12 months, no future appointment       [] Supervision: no future appointment scheduled. Scheduling has been notified to contact the pt for appointment.       [] Compliance - lapse in therapy/gap in refills; No Shows; Cancellations       [] Verification - order discrepancy, clarification needed, modification needed       [] Melida refills given. Pt did not follow-up, pended to care team for decision       [] Controlled medication       [] Medication not included in refill protocol policy       [] Medication is Reported/Historical       [] Medication not active on Pt's med list       [] > 30-day supply request       [] Advanced refill request: > 7 days before refill date       [] Review is needed: new med; med adjusted ? 30 days; Safety Alert; requires lab monitoring   Other: Last visit treatment plan \"Open/Pended/Unsigned\" - routing for review.                        "

## 2024-10-23 NOTE — TELEPHONE ENCOUNTER
"Writer reached out to patient per provider request.  Patient reports that she is \"doing good\" with the decrease in Zyprexa.  She said the her mood is good and that she feels the same.  She reports that she is sleeping good and has no concerns at this time.  She denied any hallucinations or psychotic symptoms.  She had safety concerns.  No follow up needed at this time.   Provider notified.     "

## 2024-11-08 ENCOUNTER — TELEPHONE (OUTPATIENT)
Dept: PSYCHIATRY | Facility: CLINIC | Age: 24
End: 2024-11-08
Payer: COMMERCIAL

## 2024-11-08 NOTE — TELEPHONE ENCOUNTER
Returned call to Ashley [phone]. Per Ashley, Irene is scheduled for her next two Jeff on 11/13/24 and 12/11/24, both of which will be administered by RN. Starting with patient's 1/8/25 JAY, they do not have a nurse to administer and are requesting this be done at our clinic.  Writer explained that Dr Gamez and Raquel, Lead LPN would be updated of request.  Ashley is wondering about transportation, explained we could reach out to our social work team for assistance if needed.    Per 10/3/24 visit:     1) Medications:      -We will start the monthly long acting injectables. We will send this to [facility]. Injectable schedule is as follows:               -Day 1: 234mg Invega Sustenna Injection (Loading dose 1)               -Day 8: 156mg Invega Sustenna Injection (Loading dose 2)               -28 days later and every 28 days after than, you should receive the 156mg Invega Sustenna Injection. This is comparable to the 9mg of the daily medication that you are taking.   -Plan to stop PO Invega upon getting the first injection.   -Decrease Olanzapine po from 5mg to 2.5mg . We will have a nurse check in with you in about 2 weeks. If mental health continues to remain stable, then we will plan to stop the Olanzapine completely.   -Keep PRN Olanzapine 2.5-5mg daily as needed for breakthrough psychosis symptoms.

## 2024-11-08 NOTE — TELEPHONE ENCOUNTER
[facility] Call Center    Phone Message    May a detailed message be left on voicemail: yes     Reason for Call: Other: Ashley calling because currently patient receives JAY for Invega at residential facility but the nurse that administers is retiring and they are wondering if patient can be set up with JAY at [facility] clinic.     Action Taken: Message routed to:  Other: P PSYCHIATRY NURSE-[facility]    Travel Screening: Not Applicable     Date of Service:

## 2024-11-11 ENCOUNTER — PATIENT OUTREACH (OUTPATIENT)
Dept: CARE COORDINATION | Facility: CLINIC | Age: 24
End: 2024-11-11
Payer: COMMERCIAL

## 2024-11-11 NOTE — PROGRESS NOTES
Clinic Care Coordination Contact  [facility]/Voicemail    Clinical Data: Care Coordinator Outreach    Outreach Documentation Number of Outreach Attempt   11/11/2024  12:04 PM 1       Left message on patient's voicemail with call back information and requested return call.      Plan: Care Coordinator will try to reach patient again in 1-2 business days.    Megan Weiss JUAN MANUEL  Clinic Care Coordination  [facility]  [email or web address]  [phone]

## 2024-11-11 NOTE — TELEPHONE ENCOUNTER
Good morning all,    We will be more than happy to administer the JAY Invega Sustenna 156 mg Q28 IM Injection to Irene on or around 1/8/25.     From past experiences there are no other  clinics that are willing to administer JAY's. The next best thing would be [place], but that might be a longer distance than coming into the clinic for their JAY and some hoops have to be jumped through to get that accomplished with [place].    I will enter the CAM order to be administered on 1/8/25 in the clinic. Let's try and coordinate future [facility] appointments with the JAY on the same day to save travel time for the patient.     Megan/Afua - has anyone tried reaching out to the  to see if they are able to drive the patient to their appointments? Some s are willing, others not so much. Just a thought.    Let me know your thoughts.    Raquel

## 2024-11-11 NOTE — LETTER
[facility] CARE COORDINATION  [facility]  November 13, 2024    Amie Castilloizzy  [address] [place] [place] [address]      Dear Irene,    I am a clinic care coordinator who works with Ariadna Gamez MD with the [facility]. I have been trying to reach you recently to introduce Clinic Care Coordination. Below is a description of clinic care coordination and how I can further assist you.       The clinic care coordination team is made up of a registered nurse and  who understand the health care system. The goal of clinic care coordination is to help you manage your health and improve access to the health care system. Our team works alongside your provider to assist you in determining your health and social needs. We can help you obtain health care and community resources, providing you with necessary information and education. We can work with you through any barriers and develop a care plan that helps coordinate and strengthen the communication between you and your care team.  Our services are voluntary and are offered without charge to you personally.    Please feel free to contact me with any questions or concerns regarding care coordination and what we can offer.      We are focused on providing you with the highest-quality healthcare experience possible.    Sincerely,     STEFANIE Gardner  Clinic Care Coordination  [facility]  [email or web address]  [phone]

## 2024-11-13 NOTE — PROGRESS NOTES
Clinic Care Coordination Contact  [facility]/Voicemail    Clinical Data: Care Coordinator Outreach    Outreach Documentation Number of Outreach Attempt   11/11/2024  12:04 PM 1   11/13/2024  10:07 AM 2     Left message on patient's voicemail with call back information and requested return call.      Plan: Care Coordinator will send unable to contact letter with care coordinator contact information via [employer]. Care Coordinator will try to reach patient again in 3-5 business days.    STEFANIE Gardner  Clinic Care Coordination  [facility]  [email or web address]  [phone]

## 2024-11-14 DIAGNOSIS — E55.9 VITAMIN D DEFICIENCY: ICD-10-CM

## 2024-11-18 RX ORDER — CHOLECALCIFEROL (VITAMIN D3) 50 MCG
1 TABLET ORAL DAILY
Qty: 30 TABLET | Refills: 2 | Status: SHIPPED | OUTPATIENT
Start: 2024-11-18

## 2024-11-18 NOTE — TELEPHONE ENCOUNTER
"Date of Last Office Visit:   10/3/2024  [facility] Health & Addiction [facility]    Ariadna Gamez MD  Psychiatry       RTC: 6 weeks  No shows: 0  Cancellations: 0  Date of Next Office Visit:    11/21/2024 10:40 AM (30 min)  Tomasa   Arrive by: 10:25 AM   PSYCHOSIS RETURN   URPSY ([facility])   Ariadna Gamez MD     ------------------------------    Medication requested:     Disp Refills Start End TUAN   vitamin D3 (CHOLECALCIFEROL) 50 mcg (2000 units) tablet 45 tablet 0 10/21/2024 -- No   Sig - Route: Take 1 tablet (50 mcg) by mouth daily. - Oral     ------------------------------  From last visit note:   \"Nothing noted in visit note\"     Refill Decision:    []Medication refilled per  Medication Refill in Ambulatory Care  policy.         [] Supervision: no future appointment scheduled. Scheduling has been notified to contact the pt for appointment.    [x]Medication unable to be refilled by RN due to criteria not met as indicated below:          [] Eligibility - Pt not seen within past 12 months, no future appointment       [] Supervision: no future appointment scheduled. Scheduling has been notified to contact the pt for appointment.       [] Compliance - lapse in therapy/gap in refills; No Shows; Cancellations       [] Verification - order discrepancy, clarification needed, modification needed       [] Melida refills given. Pt did not follow-up, pended to care team for decision       [] Controlled medication       [x] Medication not included in refill protocol policy       [] Medication is Reported/Historical       [] Medication not active on Pt's med list       [] > 30-day supply request       [] Advanced refill request: > 7 days before refill date       [] Review is needed: new med; med adjusted <= 30 days; Safety Alert; requires lab monitoring       [] Last visit treatment plan \"Open/Pended/Unsigned\" - routing for review.       [] OTHER:                        "

## 2024-11-20 NOTE — CONFIDENTIAL NOTE
Reason for visit: Syncope, unspecified syncope type    Referring Provider: Anne-Marie Murry APRN CNP    Office Visit Notes: 09/16/2024     Notes:09/13/24   Provider:Ismeal Carmen MD            IMAGING   STATUS/LOCATION   DATE/TYPE   MRI/MRA N/A     CT/CTA Internal, External 09/13/2024, 11/04/2022   LABS Internal    EEG NA    EMG NA    NEUOROPSYCH   TEST: Internal  Geoff Hodge, Ph.D., L.P. 11/09/2012

## 2024-11-21 ENCOUNTER — TELEPHONE (OUTPATIENT)
Dept: PSYCHIATRY | Facility: CLINIC | Age: 24
End: 2024-11-21
Payer: COMMERCIAL

## 2024-11-21 ENCOUNTER — OFFICE VISIT (OUTPATIENT)
Dept: PSYCHIATRY | Facility: CLINIC | Age: 24
End: 2024-11-21
Attending: STUDENT IN AN ORGANIZED HEALTH CARE EDUCATION/TRAINING PROGRAM
Payer: COMMERCIAL

## 2024-11-21 VITALS
BODY MASS INDEX: 22.86 KG/M2 | DIASTOLIC BLOOD PRESSURE: 70 MMHG | WEIGHT: 125 LBS | SYSTOLIC BLOOD PRESSURE: 102 MMHG | HEART RATE: 106 BPM

## 2024-11-21 DIAGNOSIS — F90.9 ATTENTION DEFICIT HYPERACTIVITY DISORDER (ADHD), UNSPECIFIED ADHD TYPE: ICD-10-CM

## 2024-11-21 DIAGNOSIS — F29 PSYCHOSIS, UNSPECIFIED PSYCHOSIS TYPE (H): ICD-10-CM

## 2024-11-21 DIAGNOSIS — F29 PSYCHOSIS, UNSPECIFIED PSYCHOSIS TYPE (H): Primary | ICD-10-CM

## 2024-11-21 DIAGNOSIS — F12.21 CANNABIS USE DISORDER, MODERATE, IN EARLY REMISSION (H): Primary | ICD-10-CM

## 2024-11-21 DIAGNOSIS — F33.42 MAJOR DEPRESSIVE DISORDER, RECURRENT EPISODE, IN FULL REMISSION (H): ICD-10-CM

## 2024-11-21 PROCEDURE — G2211 COMPLEX E/M VISIT ADD ON: HCPCS

## 2024-11-21 PROCEDURE — 99214 OFFICE O/P EST MOD 30 MIN: CPT | Mod: GC

## 2024-11-21 PROCEDURE — G0463 HOSPITAL OUTPT CLINIC VISIT: HCPCS

## 2024-11-21 RX ORDER — OLANZAPINE 2.5 MG/1
2.5-5 TABLET, FILM COATED ORAL DAILY PRN
Qty: 60 TABLET | Refills: 1 | Status: SHIPPED | OUTPATIENT
Start: 2024-11-21

## 2024-11-21 ASSESSMENT — PAIN SCALES - GENERAL: PAINLEVEL_OUTOF10: SEVERE PAIN (6)

## 2024-11-21 NOTE — PROGRESS NOTES
-Good.   -Continuing to work  -Likes group home.     -Working at [employer] , likes it there.   -Working more than 20 hours. Before was working less than 15. Hours.    -Goal is to work more. 20 hours feels manageable.   -No more episodes of lightheadedness.   -Migraine now and had it yesterday.    -Nausea. Pain around temples.    -Will follow up with neurology.   -No other physical symptoms.   -Injections: 3 so far.    -Last one: Last Wednesday.   -Tolerating injections.    -Having weird dreams, not distressing but annoying.   -Not taking the olanzapine .   -Hasn't needed as needed.   -Denying recurrence of AVH, paranoia.   -Mood overall: good. No periods of highs or lows.    -Si/Hi: no  -Anxiety; Denies  -No other mental health concerns.     Medication:   -No changes in medications since     Bm every other day   No lighthedeness or restlessness       Alcohol: None since last last visit.   Cannabis use: No use since last visit. Car motivating.   Therapy: has a therapist.     Discussed continuing commitment : She said that if she wasn't on commitment she would stilll continue to come and get injections because it is helpful.     -Discussed from our end that we wouldn't recommend commitment at this point but we would touch base with .     Plan:  Commitment -last renewed in 4/2024.  Follow up with . : Deedee Flores, [phone].    -She is on commitment 4/23/2025.      per Jarek Damon: [phone]  Nursing to put in order in , she can get it in .    the scheduled PO medications and started Invega Sustenna. She has so far received her two loading doses of Invega sustenna and one mainteinance 156mg dose. She has been doing well with the injections, has not utilized her prn Olanzapine. At her last visit there was concern for syncopal episodes for which she followed up with in the ED and PCP and there was no clear etiology or acute process identified. It is possible that she may have been dehydrated, had poor po intake and she does work in an environment where she is standing up for a long period of time. We encouraged her to move around and increase PO intake especially before and during work. Antipsychotic medications do increase risk of orthostasis so we will continue to closely monitor from our end She does have an appointment with Neurology coming up  as well. Today she reports that these episodes have not recurred. She did describe symptoms concerning for migraines and I encouraged Irene to bring this up at her upcoming neurology appointment. Otherwise Irene has been doing well, has not had recurrence of psychosis symptoms and mood appears to be euthymic. She has not had any return to cannabis use. We will not make any changes today. Per her , commitment expires 4/2025.      Irene does not appear to be at imminent risk of harming herself or anyone else at this visit.      Psychotropic Drug Interactions:  [PSYCHCLINICDDI]   Agents With Seizure Threshold Lowering Potential: Zyprexa, Invega  Management: limit med redundancy, routine monitoring, and patient aware of risks.     MNPMP was not checked today: will be checked next visit     Risk Statements:   Treatment Risk- Risks, benefits, alternatives and potential adverse effects have been discussed and are understood.   Safety Risk-Irene did not appear to be an imminent safety risk to self or others.    PLAN    *Commitment -last renewed in 4/2024.  Per : Marisol: [phone]: She is on  commitment 4/23/2025. *    *Prescriptions sent electronically to [id number] - [place], [place] - [address] AT [address] [phone]. Injections to [facility]. *     1) Medications:   -Continue 156mg Invega Sustenna Injection q 28days. You can start receiving this injection in our clinic starting 1/2025.   -Keep PRN Olanzapine 2.5-5mg daily as needed for breakthrough psychosis symptoms.   -Continue Vitamin D 50 mcg PO Qday.      Goal is to eventual monotherapy with Invega Trinza JAY.     2) Psychotherapy:   As indicated. Therapy with Jonna Murphy at [facility] in [place]. Hasn't seen in a few months. Agreeable to going back.      3) Next due:  Labs- Last obtained: October 2023 (BMP, CBC, TSH, lipid panel, HgA1c, WNL except very mild elevation of AST) Next due, routine AP labs October 2024.    EKG- Routine monitoring is not indicated for current psychotropic medication regimen. Determine next due.  Rating scales- AIMS: 1/25/24, Score = 0. Next due Jan/2025     4) Referrals: none     5) Other: None     6) Follow-up: 2 months                     Interval History   -Good.   -Continuing to work  -Likes group home.   -Working at [employer] , likes it there.   -Working more than 20 hours. Before was working less than 15. Hours.    -Goal is to work more. 20 hours feels manageable.   -No more episodes of lightheadedness.   -Migraine now and had it yesterday.    -Nausea. Pain around temples.    -Recommended following up with neurology.   -No other physical symptoms.   -Injections: 3 so far.    -Last one: Last Wednesday.   -Tolerating injections.    -Having weird dreams, not distressing but annoying.   -Not taking the olanzapine .   -Hasn't needed as needed.   -Denying recurrence of AVH, paranoia.   -Mood overall: good. No periods of highs or lows.    -Si/Hi: no  -Anxiety; Denies  -No other mental health concerns.     Medication:   -No changes in medications since   -Bm every  "other day   -No lighthedeness or restlessness     Discussed continuing commitment : She said that if she wasn't on commitment she would stilll continue to come and get injections because it is helpful. Per discussion with , commitment goes through 4/2025.       Current Social History:  -Currently working at a [employer]. 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            -40 hours a week.            -Currently on a break from work due to 2x episodes of lightheadedness (one of which was at work).    -Housing: Has permanent housing. Adult Foster Care with [place] residents. Loves it there. 4 people live there.   -Interests: Draw, paint, listen to music, go on walks.       Pertinent Substance Use:  [Last updated 12/06/24]  Alcohol: None since last last visit.   Cannabis use: No use since last visit. Mom will only let her use car if she abstains from cannabis use. Car is motivating for Irene.   Therapy: has a therapist.     Psychiatric and Medical ROS per HPI                  Pertinent Background  History of multiple psychiatric admissions. Last hospital admission was in 10/2023 at [facility] due to worsening psychotic symptoms and MI cilvil commitment with [facility] order was pursued and granted. Irene was discharged to an IRTS facility after this. She has notably had multiple ED visits historically (and before this admission) where she was found to be intoxicated on alcohol and with cannabis. Concern that cannabis may have been contributing to psychosis.      She has since been working with Dr. Soares, during which olanzapine was decreased and Invega initiated and dose slowsly increased with goal to get off olanzapine and eventually on to invega systenna and ultimately invega trinza JAY.      Please see Dr. Soares 5/23/2024 visit for further details on clinical course since Irene's discharge from [facility].  \"  Pertinent Items Include: Psychosis, aggression, substance use: cannabis and psych hosp, MI commitment with [facility] order. " "\"                  Physical Exam  (Vitals Only)    /70   Pulse 106   Wt 56.7 kg (125 lb)   BMI 22.86 kg/m    Pulse Readings from Last 3 Encounters:   11/21/24 106   10/03/24 99   09/13/24 80     Wt Readings from Last 3 Encounters:   11/21/24 56.7 kg (125 lb)   10/03/24 59.1 kg (130 lb 3.2 oz)   09/13/24 59.9 kg (132 lb)     BP Readings from Last 3 Encounters:   11/21/24 102/70   10/03/24 109/77   09/13/24 91/50                      Mental Status Exam    Alertness: alert  and oriented  Appearance: well groomed  Behavior/Demeanor: pleasant, calm, and guarded, with intermittent eye contact.   Speech: normal and regular rate and rhythm  Language: intact and no obvious problem  Psychomotor: normal or unremarkable  Mood:  \"good\"  Affect: full range for topics discussed, congruent to: mood- yes, content- yes  Thought Process/Associations: linear, goal directed   Thought Content:  Reports none;  Denies suicidal & violent ideation   Perception:  Reports none;  Denies auditory hallucinations, visual hallucinations, paranoia; does not appear to be responding to internal stimuli  Insight: adequate and fair  Judgment: good  Cognition: does  appear grossly intact; formal cognitive testing was not done  Gait and Station: unremarkable                 Past Psychotropic Medication Trials     Medication Max Dose (mg) Dates / Duration Helpful? DC Reason / Adverse Effects?   lexapro 20 1 year.Discont  May 2023  N Has not helped at all.   olanzapine  20 mg    Y Dose increased from 10 to 20 mg daily dose during Oct/2023 hospitalization    Prozac   2021   Somewhat helpful, but caused mood instability, vivid dreams    Wellbutrin  150 mg   Discontinued October 2023  N                             Past Medical History     Patient Active Problem List   Diagnosis    Adopted 2/07 from [facility]    ADHD, predominantly inattentive type    Seasonal allergic rhinitis    Hearing deficit, left    Low ferritin    Acute nonintractable headache, " unspecified headache type    Major depressive disorder with single episode, in full remission (H)    Discoloration of eye    Concussion without loss of consciousness, initial encounter    Recurrent major depressive disorder (H)                     Medications     Current Outpatient Medications   Medication Sig Dispense Refill    OLANZapine (ZYPREXA) 2.5 MG tablet Take 1-2 tablets (2.5-5 mg) by mouth daily as needed (for breakthrough hallucinations and anxiety associated with hallucinations.). 60 tablet 1    paliperidone (INVEGA SUSTENNA) 156 MG/ML JACKY injection Inject 1 mL (156 mg) into the muscle every 28 days. First dose to be administered 28 days after the last 156mg dose. Administer every 28 days after that. 1 mL 11    vitamin D3 (CHOLECALCIFEROL) 50 mcg (2000 units) tablet Take 1 tablet (50 mcg) by mouth daily. 30 tablet 2    paliperidone (INVEGA SUSTENNA) 156 MG/ML JACKY injection Inject 1 mL (156 mg) into the muscle once for 1 dose. Administer on Day 8 (7 days after the 234mg injection). 1 mL 0    paliperidone (INVEGA SUSTENNA) 234 MG/1.5ML JACKY Inject 1.5 mLs (234 mg) into the muscle once for 1 dose. Administer on Day 1. 1.5 mL 0                     Data         11/9/2023     1:29 PM 3/7/2024     2:25 PM 10/3/2024     8:36 AM   PROMIS-10 Total Score w/o Sub Scores   PROMIS TOTAL - SUBSCORES 35 34 36         5/23/2024     3:25 PM 10/3/2024     8:35 AM 11/21/2024    10:19 AM   PHQ-9 SCORE   PHQ-9 Total Score MyChart 0 0 0   PHQ-9 Total Score 0 0 0        Patient-reported         2/27/2019     1:42 PM 4/28/2021     3:30 PM   DAMIÁN-7 SCORE   Total Score 1 0       Liver/Kidney Function, TSH Metabolic Blood counts   Recent Labs   Lab Test 09/13/24 1736 03/26/24  0919 05/09/23  1417   AST  --  23  --    ALT  --  11  --    ALKPHOS  --  82  --    CR 0.71  --  0.83     Recent Labs   Lab Test 09/13/24 1736   TSH 1.32    Recent Labs   Lab Test 03/26/24 0919   CHOL 151   TRIG 60   LDL 88   HDL 51     Recent Labs   Lab  Test 24  0919   A1C 5.1     Recent Labs   Lab Test 24  1736   GLC 93    Recent Labs   Lab Test 24  1736   WBC 5.8   HGB 11.5*   HCT 33.5*   MCV 87            EK2024: Qtc: 421     Level of Medical Decision Making:   - At least 1 chronic problem that is not stable  - Engaged in prescription drug management during visit (discussed any medication benefits, side effects, alternatives, etc.)         The longitudinal plan of care for the diagnosis(es)/condition(s) as documented were addressed during this visit. Due to the added complexity in care, I will continue to support Irene in the subsequent management and with ongoing continuity of care.      PROVIDER:   Ariadna Gamez MD  PGY-3 Psychiatry  [facility]     Patient staffed in clinic with Dr. Whitman who will sign the note.  Supervisor is Dr. Huber.

## 2024-11-21 NOTE — TELEPHONE ENCOUNTER
VORB - authorization obtained to verify insurance coverage to start JAY Invega Sustenna 156 mg Q28D in clinic per Dr Ariadna Gamez.Raquel Arias LPN Lead

## 2024-11-21 NOTE — PATIENT INSTRUCTIONS
Medication Changes: None today    Other:  Irene can start getting injections in the clinic starting January.   Please have vitamin D level collected.     Follow Up: 2 months

## 2024-11-26 ENCOUNTER — TELEPHONE (OUTPATIENT)
Dept: PSYCHIATRY | Facility: CLINIC | Age: 24
End: 2024-11-26
Payer: COMMERCIAL

## 2024-11-26 NOTE — TELEPHONE ENCOUNTER
Approval received from [employer]. Patient is ok to proceed with in-clinic JAY appointments. See below. Provider updated.Raquel Arias LPN Lead    Good morning - Just need to verify if the patient's insurance will cover JAY in clinic? Shows as Pend.   7:29 AM  Payam Soria! Good morning. Let me check.  18 mins  TL  Insurance should cover JAY. No [employer] Policy. No PA REQ regardless of DX (does not need to meet NCCN/FDA guidelines). Patient is okay to proceed.

## 2024-12-19 ENCOUNTER — VIRTUAL VISIT (OUTPATIENT)
Dept: PSYCHIATRY | Facility: CLINIC | Age: 24
End: 2024-12-19
Attending: STUDENT IN AN ORGANIZED HEALTH CARE EDUCATION/TRAINING PROGRAM
Payer: COMMERCIAL

## 2024-12-19 DIAGNOSIS — F90.9 ATTENTION DEFICIT HYPERACTIVITY DISORDER (ADHD), UNSPECIFIED ADHD TYPE: ICD-10-CM

## 2024-12-19 DIAGNOSIS — F12.21 CANNABIS USE DISORDER, MODERATE, IN EARLY REMISSION (H): Primary | ICD-10-CM

## 2024-12-19 DIAGNOSIS — F29 PSYCHOSIS, UNSPECIFIED PSYCHOSIS TYPE (H): ICD-10-CM

## 2024-12-19 DIAGNOSIS — F33.42 MAJOR DEPRESSIVE DISORDER, RECURRENT EPISODE, IN FULL REMISSION: ICD-10-CM

## 2024-12-19 DIAGNOSIS — Z79.899 LONG TERM CURRENT USE OF ANTIPSYCHOTIC MEDICATION: ICD-10-CM

## 2024-12-19 ASSESSMENT — PAIN SCALES - GENERAL: PAINLEVEL_OUTOF10: NO PAIN (0)

## 2024-12-19 NOTE — PATIENT INSTRUCTIONS
Medication Changes: None today.    **For crisis resources, please see the information at the end of this document**   Patient Education    Thank you for coming to the [facility] [place] [facility].     Lab Testing:  If you had lab testing today and your results are reassuring or normal they will be mailed to you or sent through [facility] within 7 days. If the lab tests need quick action we will call you with the results. The phone number we will call with results is # [phone]. If this is not the best number please call our clinic and change the number.     Medication Refills:  If you need any refills please call your pharmacy and they will contact us. Our fax number for refills is [phone].   Three business days of notice are needed for general medication refill requests.   Five business days of notice are needed for controlled substance refill requests.   If you need to change to a different pharmacy, please contact the new pharmacy directly. The new pharmacy will help you get your medications transferred.     Contact Us:  Please call [phone] during business hours (8-5:00 M-F).   If you have medication related questions after clinic hours, or on the weekend, please call [phone].     Financial Assistance [phone]   Medical Records [phone]       MENTAL HEALTH CRISIS RESOURCES:  For a emergency help, please call 911 or go to the nearest Emergency Department.     Emergency Walk-In Options:   EmPATH Unit @ [facility] ([place]): [phone] - Specialized mental health emergency area designed to be calming  [facility] ([place]): [phone]  [facility] ([place]): [phone]  [facility]): [phone]    [facility] Crisis Information:   [place]: [phone]  [place]: [phone]  Radha (DARRYN) - Adult: [phone]     Child: [phone]  [place] - Adult: [phone]     Child:  [phone]  [place]: [phone]  List of all [facility] resources:   https://[place].gov/dhs/people-we-serve/adults/health-care/mental-health/resources/crisis-contacts.jsp    National Crisis Information:   Crisis Text Line: Text  [place]  to 732403  Suicide & Crisis Lifeline: 988  National Suicide Prevention Lifeline: 6-606-443-TALK (1-827.953.1848)       For online chat options, visit https://suicidepreventionlifeline.org/chat/  Poison Control Center: 8-990-717-5753  Trans Lifeline: 7-349-418-1680 - Hotline for transgender people of all ages  The [employer] Project: 1-915-305-7229 - Hotline for LGBT youth     For Non-Emergency Support:   Fast Tracker: Mental Health & Substance Use Disorder Resources -   https://www.[employer].org/

## 2024-12-19 NOTE — NURSING NOTE
Current patient location:  [place]    Is the patient currently in the state of [place]? YES    Visit mode:VIDEO    If the visit is dropped, the patient can be reconnected by:VIDEO VISIT: Text to cell phone:   Telephone Information:   Mobile [phone]       Will anyone else be joining the visit? NO  (If patient encounters technical issues they should call [phone])    Are changes needed to the allergy or medication list? No    Are refills needed on medications prescribed by this physician? NO    Rooming Documentation:  Not applicable    Reason for visit: JASON DIALLO

## 2024-12-19 NOTE — PROGRESS NOTES
"   [facility]  MEDICAL PROGRESS NOTE  Psychosis Treatment Team       CARE TEAM:    Therapist- Family education program with Jose Ventura.   Other (family therapy) Jonna Murphy at [facility] in [place]   Legal: Civil Commitment . Type of commitment: MI  Date of commitment: 10/30/23  Date of commitment expiration: 04/26/24. Order for continued commitment on 4/23/24.   Harris: Yes.   Medications authorized by Harris order: Haloperidol [HALDOL], Olanzapine [ZYPREXA], Paliperidone [INVEGA], and Risperidone [RISPERDAL].  Coy Martinez: No.   : Marisol: [phone]     Amie LITTLEEileen Santiago, who prefers being addressed as \"Irene\" is a 23 year old person who uses the pronouns she, her, hers.                    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 She is presenting for psychiatric follow up and transfer of care between resident providers. She was previously working with Dr. Soares on transitioning her from Olanzapine and Invega to Invega JAY, with the goal to ultimately get on Trinza. This is in the context of decompensating psychosis in 10/2023 resulting in [facility] admission and commitment with nelson (see pertinent background for more info). Irene has notably had a history of psychiatric hospitalizations prior to this as well. They have also been working on cutting down on her cannabis use, which may have been a contributing factor to her psychosis.     I first met with Irene on 10/3/2024, at this time she was doing well with Invega PO, low dose scheduled Olanzapine and prn olanzapine. Since this visit, we stopped " the scheduled PO medications and started Invega Sustenna. She has so far received her two loading doses of Invega sustenna and one mainteinance 156mg dose. She has been doing well with the injections, has not utilized her prn Olanzapine. At her last visit there was concern for syncopal episodes for which she followed up with in the ED and PCP and there was no clear etiology or acute process identified. It is possible that she may have been dehydrated, had poor po intake and she does work in an environment where she is standing up for a long period of time. We encouraged her to move around and increase PO intake especially before and during work. Antipsychotic medications do increase risk of orthostasis so we will continue to closely monitor from our end She does have an appointment with Neurology coming up  as well. Today she reports that these episodes have not recurred. She did describe symptoms concerning for migraines and I encouraged Irene to bring this up at her upcoming neurology appointment. Otherwise Irene has been doing well, has not had recurrence of psychosis symptoms and mood appears to be euthymic. She has not had any return to cannabis use. We will not make any changes today. Per her , commitment expires 4/2025.      Irene does not appear to be at imminent risk of harming herself or anyone else at this visit.      Psychotropic Drug Interactions:  [PSYCHCLINICDDI]   Agents With Seizure Threshold Lowering Potential: Zyprexa, Invega  Management: limit med redundancy, routine monitoring, and patient aware of risks.     MNPMP was not checked today: will be checked next visit     Risk Statements:   Treatment Risk- Risks, benefits, alternatives and potential adverse effects have been discussed and are understood.   Safety Risk-Irene did not appear to be an imminent safety risk to self or others.    PLAN    *Commitment -last renewed in 4/2024.  Per : Marisol: [phone]: She is on  commitment 4/23/2025. *    *Prescriptions sent electronically to [id number] - [place], [place] - [address] AT [address] [phone]. Injections to [facility]. *     1) Medications:   -Continue 156mg Invega Sustenna Injection q 28days. You can start receiving this injection in our clinic starting 1/2025.   -Keep PRN Olanzapine 2.5-5mg daily as needed for breakthrough psychosis symptoms.   -Continue Vitamin D 50 mcg PO Qday.      Goal is to eventual monotherapy with Invega Trinza JAY.     2) Psychotherapy:   As indicated. Therapy with Jonna Murphy at [facility] in [place]. Hasn't seen in a few months. Agreeable to going back.      3) Next due:  Labs- Last obtained: October 2023 (BMP, CBC, TSH, lipid panel, HgA1c, WNL except very mild elevation of AST) Next due, routine AP labs October 2024.    EKG- Routine monitoring is not indicated for current psychotropic medication regimen. Determine next due.  Rating scales- AIMS: 1/25/24, Score = 0. Next due Jan/2025     4) Referrals: none     5) Other: None     6) Follow-up: 2 months                     Interval History   -Things overall:    -Good.  -Rest of birthday: It was good. Went out to eat with mom, stayed over at mom's.    -Rest of family lives in [place].    -Doesn't see them often.    -Sees them a decent amount.   -Work at [employer]: Slowing down. Likes it a lot and more than previous vjob at [employer].   -12 hours a week.   -Things at group home:     Mental Health:   -Mood:Good.   -Any periods of highs or lows: no .   -SI/HI: no.   -Anxiety: no.   -AVH, paranoia: no.     Therapy: No hasn't scheduled in a long time. Maybe will schedule.     Substances:   Cannabis: Nope. Motivation is still to use car.   Nicotine: Everyday- vaping. Disposable. Goes through disposable in two weeks. Has cut down. Not interested in MAT at this time.   Alcohol : not in a long time. Had one drink at [facility] but thought it was  nasty.   Other substances: no .     Medications, Side Effects, Medical ROS:   -No issues with the injections   -Hasn't used prn olanzapine  -Prescribed Sumatriptan by PCP for migraines.    -Side effects:    -Lightheadedness:    -Any more incidents: Last week when she had migraine last week. Ate breakfast, drank a lot of water. Felt lightheaded and felt like passing out- sat down. Thinks it might be related to migraines. Still going to  follow up with neurology.    -Sometimes when waking up and standing up really quickly.    -Bowel Movements: good-every other day. Discussed OTC miralax.    -EPS: No.     Ashley:   -Injection: We need to contact [facility]. Let them know that it no lnoger needs to be sent to the house.    -Discussed   -      Plan:  -Scheduled for 1/8/25 for injection.    -Schedule next appt for 2/5/24. (4 weeks after next injection).   -Will get Vit D  on 1/8/24   -Remind commitment is until 2025.   Nicotine cessation? Not interested  -Discussed scheduling injection and doing appointment every other visit.         Current Social History:  -Currently working at [email or web address]. Degree is in [employer].            -40 hours a week.            -Currently on a break from work due to 2x episodes of lightheadedness (one of which was at work).    -Housing: Has permanent housing. Adult Foster Care with [place] residents. Loves it there. 4 people live there.   -Interests: Draw, paint, listen to music, go on walks.       Pertinent Substance Use:  [Last updated 12/06/24]  Alcohol: None since last last visit.   Cannabis use: No use since last visit. Mom will only let her use car if she abstains from cannabis use. Car is motivating for Irene.   Therapy: has a therapist.     Psychiatric and Medical ROS per HPI                  Pertinent Background  History of multiple psychiatric admissions. Last hospital admission was in 10/2023 at [facility] due to worsening psychotic symptoms and MI cilvil commitment with Steven barton was pursued  "and granted. Irene was discharged to an [facility] facility after this. She has notably had multiple ED visits historically (and before this admission) where she was found to be intoxicated on alcohol and with cannabis. Concern that cannabis may have been contributing to psychosis.      She has since been working with Dr. Soares, during which olanzapine was decreased and Invega initiated and dose slowsly increased with goal to get off olanzapine and eventually on to invega systenna and ultimately invega trinza JAY.      Please see Dr. Soares 5/23/2024 visit for further details on clinical course since Irene's discharge from [facility].  \"  Pertinent Items Include: Psychosis, aggression, substance use: cannabis and psych hosp, MI commitment with Harris order. \"                  Physical Exam  (Vitals Only)    There were no vitals taken for this visit.  Pulse Readings from Last 3 Encounters:   11/21/24 106   10/03/24 99   09/13/24 80     Wt Readings from Last 3 Encounters:   11/21/24 56.7 kg (125 lb)   10/03/24 59.1 kg (130 lb 3.2 oz)   09/13/24 59.9 kg (132 lb)     BP Readings from Last 3 Encounters:   11/21/24 102/70   10/03/24 109/77   09/13/24 91/50                      Mental Status Exam    Alertness: alert  and oriented  Appearance: well groomed  Behavior/Demeanor: pleasant, calm, and guarded, with intermittent eye contact.   Speech: normal and regular rate and rhythm  Language: intact and no obvious problem  Psychomotor: normal or unremarkable  Mood:  \"good\"  Affect: full range for topics discussed, congruent to: mood- yes, content- yes  Thought Process/Associations: linear, goal directed   Thought Content:  Reports none;  Denies suicidal & violent ideation   Perception:  Reports none;  Denies auditory hallucinations, visual hallucinations, paranoia; does not appear to be responding to internal stimuli  Insight: adequate and fair  Judgment: good  Cognition: does  appear grossly intact; formal cognitive testing was not done  Gait " and Station: unremarkable                 Past Psychotropic Medication Trials     Medication Max Dose (mg) Dates / Duration Helpful? DC Reason / Adverse Effects?   lexapro 20 1 year.Discont  May 2023  N Has not helped at all.   olanzapine  20 mg    Y Dose increased from 10 to 20 mg daily dose during Oct/2023 hospitalization    Prozac   2021   Somewhat helpful, but caused mood instability, vivid dreams    Wellbutrin  150 mg   Discontinued October 2023  N                             Past Medical History     Patient Active Problem List   Diagnosis    Adopted 2/07 from [facility]    ADHD, predominantly inattentive type    Seasonal allergic rhinitis    Hearing deficit, left    Low ferritin    Acute nonintractable headache, unspecified headache type    Major depressive disorder with single episode, in full remission (H)    Discoloration of eye    Concussion without loss of consciousness, initial encounter    Recurrent major depressive disorder (H)                     Medications     Current Outpatient Medications   Medication Sig Dispense Refill    OLANZapine (ZYPREXA) 2.5 MG tablet Take 1-2 tablets (2.5-5 mg) by mouth daily as needed (for breakthrough hallucinations and anxiety associated with hallucinations.). 60 tablet 1    paliperidone (INVEGA SUSTENNA) 156 MG/ML JACKY injection Inject 1 mL (156 mg) into the muscle once for 1 dose. Administer on Day 8 (7 days after the 234mg injection). 1 mL 0    paliperidone (INVEGA SUSTENNA) 156 MG/ML JACKY injection Inject 1 mL (156 mg) into the muscle every 28 days. First dose to be administered 28 days after the last 156mg dose. Administer every 28 days after that. 1 mL 11    paliperidone (INVEGA SUSTENNA) 234 MG/1.5ML JACKY Inject 1.5 mLs (234 mg) into the muscle once for 1 dose. Administer on Day 1. 1.5 mL 0    SUMAtriptan (IMITREX) 25 MG tablet Take 1 tablet (25 mg) by mouth at onset of headache for migraine. May repeat in 2 hours. Max 8 tablets/24 hours. 10 tablet 0    vitamin D3  (CHOLECALCIFEROL) 50 mcg (2000 units) tablet Take 1 tablet (50 mcg) by mouth daily. 30 tablet 2                     Data         2023     1:29 PM 3/7/2024     2:25 PM 10/3/2024     8:36 AM   PROMIS-10 Total Score w/o Sub Scores   PROMIS TOTAL - SUBSCORES 35 34 36         2024     3:25 PM 10/3/2024     8:35 AM 2024    10:19 AM   PHQ-9 SCORE   PHQ-9 Total Score MyChart 0 0 0   PHQ-9 Total Score 0 0 0        Patient-reported         2019     1:42 PM 2021     3:30 PM   DAMIÁN-7 SCORE   Total Score 1 0       Liver/Kidney Function, TSH Metabolic Blood counts   Recent Labs   Lab Test 24  1736 24  0919 23  1417   AST  --  23  --    ALT  --  11  --    ALKPHOS  --  82  --    CR 0.71  --  0.83     Recent Labs   Lab Test 24  173   TSH 1.32    Recent Labs   Lab Test 24  0919   CHOL 151   TRIG 60   LDL 88   HDL 51     Recent Labs   Lab Test 24  0919   A1C 5.1     Recent Labs   Lab Test 24  1736   GLC 93    Recent Labs   Lab Test 24   WBC 5.8   HGB 11.5*   HCT 33.5*   MCV 87            EK2024: Qtc: 421     Level of Medical Decision Making:   - At least 1 chronic problem that is not stable  - Engaged in prescription drug management during visit (discussed any medication benefits, side effects, alternatives, etc.)         The longitudinal plan of care for the diagnosis(es)/condition(s) as documented were addressed during this visit. Due to the added complexity in care, I will continue to support Irene in the subsequent management and with ongoing continuity of care.      PROVIDER:   Ariadna Gamez MD  PGY-3 Psychiatry  [facility]     Patient not staffed, will discuss at supervision.

## 2024-12-19 NOTE — PROGRESS NOTES
"Virtual Visit Details    Type of service:  Video Visit   Video Start Time: {video visit start/end time for provider to select:951484}  Video End Time:{video visit start/end time for provider to select:003139}    Originating Location (pt. Location): {video visit patient location:796319::\"Home\"}  {PROVIDER LOCATION On-site should be selected for visits conducted from your clinic location or adjoining [facility] hospital, academic office, or other nearby [facility] building. Off-site should be selected for all other provider locations, including home:556445}  Distant Location (provider location):  {virtual location provider:838156}  Platform used for Video Visit: {Virtual Visit Platforms:950950::\"Videojug\"}    "

## 2025-01-06 NOTE — PROGRESS NOTES
Ariadna Gamez MD Illg, Danielle, LPN Hello,    Yes! Ok to continue.    Thank You,  Ariadna          Previous Messages       ----- Message -----  From: Elena Kaplan LPN  Sent: 1/6/2025   1:13 PM CST  To: MD Laverne Ferrari,  Ok to continue JAY Invega Sustenna 156 mg q28d?  Thank you  Valentino

## 2025-01-07 ENCOUNTER — TELEPHONE (OUTPATIENT)
Dept: PSYCHIATRY | Facility: CLINIC | Age: 25
End: 2025-01-07
Payer: COMMERCIAL

## 2025-01-07 NOTE — TELEPHONE ENCOUNTER
----- Message from Ariadna Gamez sent at 1/7/2025  4:12 PM CST -----  Regarding: RE: Kehinde García,     I think it's signed from my end but waiting for the co-sign. Thanks for sending the verbal auth.      I am ok with administering the Invega Sustenna 156 mg, IM Injection, Q28D on 1/8/2025. And I am actually not meeting with her tomorrow! We are going to meet every other injection.     Thank You,   Ariadna  ----- Message -----  From: Raquel Arias LPN  Sent: 1/7/2025  11:00 AM CST  To: Ariadna Gamez MD  Subject: Irene Guerra is coming in to see you tomorrow then injection. Your last note is still open so I will need a verbal authorization to administer the JAY.    Verifying that it is ok to administer Invega Sustenna 156 mg, IM Injection, Q28D on 1/8/2025.    Also, if they arrive early, is it ok to administer before your appointment?    Thanks,    Raquel

## 2025-01-07 NOTE — PROGRESS NOTES
Writer called the patient to remind them of their JAY appointment on 1/8/25 at 1330. There was no answer and writer left a voice message relaying this information.

## 2025-01-07 NOTE — TELEPHONE ENCOUNTER
Verbal authorization obtained to administer JAY Invega Sustenna 156 mg, IM Injection, Q28D on 1/8/2025, per Dr Ariadna Gamez.Raquel Arias LPN Lead

## 2025-01-08 ENCOUNTER — ALLIED HEALTH/NURSE VISIT (OUTPATIENT)
Dept: PSYCHIATRY | Facility: CLINIC | Age: 25
End: 2025-01-08
Payer: COMMERCIAL

## 2025-01-08 VITALS
SYSTOLIC BLOOD PRESSURE: 97 MMHG | WEIGHT: 124.2 LBS | HEART RATE: 88 BPM | DIASTOLIC BLOOD PRESSURE: 66 MMHG | BODY MASS INDEX: 22.72 KG/M2

## 2025-01-08 DIAGNOSIS — F29 PSYCHOSIS, UNSPECIFIED PSYCHOSIS TYPE (H): Primary | ICD-10-CM

## 2025-01-08 PROCEDURE — 250N000011 HC RX IP 250 OP 636: Mod: JZ | Performed by: STUDENT IN AN ORGANIZED HEALTH CARE EDUCATION/TRAINING PROGRAM

## 2025-01-08 PROCEDURE — 96372 THER/PROPH/DIAG INJ SC/IM: CPT | Performed by: STUDENT IN AN ORGANIZED HEALTH CARE EDUCATION/TRAINING PROGRAM

## 2025-01-08 RX ADMIN — PALIPERIDONE PALMITATE 156 MG: 156 INJECTION INTRAMUSCULAR at 13:35

## 2025-01-08 ASSESSMENT — PAIN SCALES - GENERAL: PAINLEVEL_OUTOF10: NO PAIN (0)

## 2025-01-08 NOTE — NURSING NOTE
"Clinic Administered Medication Documentation      Injectable Medication Documentation    Is there an active order (written within the past 365 days, with administrations remaining, not ) in the chart? Yes.     Patient was given  Invega Sustenna 156 mg . Prior to medication administration, verified patient's identity using patient s name and date of birth. Please see MAR and medication order for additional information. Patient instructed to remain in clinic for 15 minutes, report any adverse reaction to staff immediately, and remain in clinic for 15 minutes and report any adverse reaction to staff immediately but patient declined.    Vial/Syringe: Syringe  Was this medication supplied by the patient? No  Is this a medication the patient will need to receive again? Yes. Verified that the patient has refills remaining in their prescription.        OBSERVATIONS:  1.  Appearance: No apparent distress, Casually dressed, and Adequately groomed  2.  Mood: \"good\", calm, friendly, and pleasant  3.  Affect: full range  4.  Attitude: pleasant and cooperative  5.  Cooperation: Participated voluntarily  6.  Safety denies any current safety concerns including suicidal ideation, self-harm, and homicidal ideation  7.  Side Effects: none              "

## 2025-01-20 ENCOUNTER — OFFICE VISIT (OUTPATIENT)
Dept: NEUROLOGY | Facility: CLINIC | Age: 25
End: 2025-01-20
Payer: COMMERCIAL

## 2025-01-20 ENCOUNTER — PRE VISIT (OUTPATIENT)
Dept: NEUROLOGY | Facility: CLINIC | Age: 25
End: 2025-01-20

## 2025-01-20 VITALS — WEIGHT: 119.1 LBS | BODY MASS INDEX: 21.78 KG/M2 | OXYGEN SATURATION: 98 %

## 2025-01-20 DIAGNOSIS — G43.109 MIGRAINE WITH AURA AND WITHOUT STATUS MIGRAINOSUS, NOT INTRACTABLE: ICD-10-CM

## 2025-01-20 DIAGNOSIS — R55 SYNCOPE, UNSPECIFIED SYNCOPE TYPE: Primary | ICD-10-CM

## 2025-01-20 RX ORDER — SUMATRIPTAN 50 MG/1
50 TABLET, FILM COATED ORAL
Qty: 9 TABLET | Refills: 5 | Status: SHIPPED | OUTPATIENT
Start: 2025-01-20

## 2025-01-20 NOTE — LETTER
2025      Amie Santiago  [address]  [facility] [address]      Dear Colleague,    Thank you for referring your patient, Amie Santiago, to the [facility]. Please see a copy of my visit note below.      Neurology Consultation    Patient Name:  Amie Santiago  MRN:  [id number]    :  2000  Date of Service:  2025  Primary care provider:  Anne-Marie Murry        Chief Complaint: Syncope     History of Present Illness:     Amie Santiago is a 24 year old female who presents for evaluation of syncope.     Denies episodes prior to 2024.      Description: Felt hot/overheated -->  see black dots in her vision --> LOC    Denies any other aura including abnormal taste/smell, tiarra vu, numbness, or rising GI sensation.     Had one episode where she was sitting on the toilet in the bathroom at work and stood up and lost consciousness. Another episode at home where she had stood up and was walking around when she passed out.  One episode has been witnessed by a coworker.  She was standing at the register and then had LOC. Denies seizure like activity.  Was unconscious briefly, maybe 30 seconds.  Denies associated oral trauma or loss of bowel or bladder control.  She denies significant confusion or sedation after regaining consciousness.  She was nauseous afterwards.      She had 3 syncopal episodes in a week in 2024. Went to the ED. CT head was unremarkable. Since then, she has had one episode in 2024 where she felt like she was going to pass out but did not.  Has not had anymore episodes of syncope/presyncopal feeling. Eating and drinking before work seems to help.  Getting good sleep seems to help as well.     She is uncertain if she had any medication changes around that time.      Epilepsy Risk Factors:  - Childhood seizures, including febrile seizures: Denies   - Birth and  history: She is uncertain about her birth or  " history  - Developmental milestones: Denies any difficulty with this.    - Family History: She is adopted so uncertain about family history of seizures.  - CNS infections: Denies   - Intracranial structural abnormalities: CT head negative    Headaches    - History: She has been having sporadic headaches maybe once a year prior to 2024.  She had worsening of headaches after she hit her head with one of the syncopal events.  When she gets these headaches feels like her whole body is achy/\"like I just got a tetanus shot\".   - Location: Bifrontal to temples   - Quality: Achy   - Duration: 2-3 days   - Frequency: 1/month, 2-3 headache days/month   - Associated symptoms: +photophobia, denies phonophobia, sometimes has nausea and vomiting.  Gradual onset.  Denies aura. Denies vision changes associated.  Denies focal weakness associated.  Headaches improve with laying down but no significant change with position.  Worse with activity.     - Alleviating Factors: Laying down in the dark, sleep  - Triggers: Skipping meals, dehydrated   - Birth Control/Menses: Has not identified any relationship to menstrual cycle. Not on birth control. No chance could be pregnant. No active family planning.   - Co-morbidities: ADHD, depression, substance induced psychosis vs schizophreniform disorder.  Denies history of sleep apnea.  She does snore sometimes and reports having 3 episodes where she has stopped breathing in her sleep (someone told her).   - Risk Factors: She is uncertain about family history - adopted.    - Prior Workup: CT head WO unremarkable. Has never seen a neurologist before.   - Headache hygiene: Denies problems with sleep.  Denies regular use of OTC pain reliever.  Denies regular caffeine intake.  Stays well hydrated now.     - Prior Medications: Maybe rizatriptan (borrowed from adopted mom - helped)  - Current Medications: Sumatriptan 25 mg (prescribed but has not needed it yet)  - Prior Treatment: Denies "   - Current Treatment: Denies     Has lost weight in the last year, went from ~145 to 119 today     Review of Records   - CT head WO unremarkable   - TTE normal     Past Medical History:  - left-sided hearing deficit, seasonal allergic rhinitis, ADHD, depression, substance induced psychosis vs schizophreniform disorder, and low ferritin     Social History:  - Works at [employer].  Denies tobacco use, recreational drug use (prior cannabis use), occasional alcohol consumption     Allergies:  Allergies   Allergen Reactions     Gluten Meal        Physical Exam:  /66   Pulse 84   Wt 99.4 kg (219 lb 1.6 oz)   SpO2 98%   BMI 40.07 kg/m      General:  No acute distress  Cardiovascular: Normal rate.  Lung: Respirations are non-labored.  Extremities: Normal range of motion  Integumentary: Warm and dry    Neurologic:  Mental status : alert, fund of knowledge appropriate for visit    LANGUAGE: Speech fluent, no dysarthria     CN:  II- pupils equal and reactive, visual fields full, funduscopic exam no optic disc edema   III, IV, VI- extraocular movements intact  V- sensation intact bilaterally  VII- face symmetric  VIII- hearing intact, no nystagmus  IX, X- palate elevates symmetrically  XI- sternocleidomastoid 5/5  XII- tongue midline    MOTOR : intact bulk and tone  5/5 strength in all ext    SENSORY : intact to temp and vibration in BUE and BLE     REFLEXES:       Right Left   Triceps 1+ 1+   Biceps 1+ 1+   Brachioradialis 1+ 1+   Knee jerk 1+ 1+   Ankle jerk 1+ 1+   Kennedy absent   Toes down going     CEREBELLUM: finger to nose, finger taps, and heel to shin intact bilaterally     GAIT : Largely normal     Cognition and Speech: Speech clear and coherent.    Psychiatric: Cooperative, flat affect.     Assessment/Plan:   Amie Santiago is a 24 year old year old female who presents for evaluation of syncope and headaches.     Syncope  - Suspect orthostatic hypotension in the setting of possible dehydration and  medication side effects.  Symptoms have improved/resolved with increased oral intact in the AM.  She had TTE and EKG that was unrevealing. Low suspicion for seizure activity but with reported significant weight loss and AM nausea, will go ahead and obtain MRI brain.  Will also obtain EEG.  Encouraged continued liberal hydration and eating breakfast in the AM.  If MRI unremarkable, will defer to PCP concerning the weight loss. No indication for seizure precautions as she is 3 months from last syncopal event.   Plan  > MRI brain W/WO  > Routine EEG     Migraine without aura   - Headaches are migrainous in nature.  Suspect they have increased in frequency secondary to head trauma from syncopal event in 9/2024.  Don't think a preventative is indicated at this time. Did increase dose of sumatriptan that was prescribed by PCP.  Counseled on possible SE and appropriate way to take.  Plan  > Increase sumatriptan 25 mg --> 50 mg prn      Follow-up in 3 months     I spent 61 minutes providing care for this patient, including reviewing imaging, labs, and notes as well as providing counseling and coordination of care for the above issues.      Again, thank you for allowing me to participate in the care of your patient.        Sincerely,        Sarah He, DO    Electronically signed

## 2025-01-20 NOTE — PROGRESS NOTES
Neurology Consultation    Patient Name:  Amie Santiago  MRN:  [id number]    :  2000  Date of Service:  2025  Primary care provider:  Anne-Marie Murry        Chief Complaint: Syncope     History of Present Illness:     Amie Santiago is a 24 year old female who presents for evaluation of syncope.     Denies episodes prior to 2024.      Description: Felt hot/overheated -->  see black dots in her vision --> LOC    Denies any other aura including abnormal taste/smell, tiarra vu, numbness, or rising GI sensation.     Had one episode where she was sitting on the toilet in the bathroom at work and stood up and lost consciousness. Another episode at home where she had stood up and was walking around when she passed out.  One episode has been witnessed by a coworker.  She was standing at the register and then had LOC. Denies seizure like activity.  Was unconscious briefly, maybe 30 seconds.  Denies associated oral trauma or loss of bowel or bladder control.  She denies significant confusion or sedation after regaining consciousness.  She was nauseous afterwards.      She had 3 syncopal episodes in a week in 2024. Went to the ED. CT head was unremarkable. Since then, she has had one episode in 2024 where she felt like she was going to pass out but did not.  Has not had anymore episodes of syncope/presyncopal feeling. Eating and drinking before work seems to help.  Getting good sleep seems to help as well.     She is uncertain if she had any medication changes around that time.      Epilepsy Risk Factors:  - Childhood seizures, including febrile seizures: Denies   - Birth and  history: She is uncertain about her birth or  history  - Developmental milestones: Denies any difficulty with this.    - Family History: She is adopted so uncertain about family history of seizures.  - CNS infections: Denies   - Intracranial structural abnormalities: CT head  "negative    Headaches    - History: She has been having sporadic headaches maybe once a year prior to 9/2024.  She had worsening of headaches after she hit her head with one of the syncopal events.  When she gets these headaches feels like her whole body is achy/\"like I just got a tetanus shot\".   - Location: Bifrontal to temples   - Quality: Achy   - Duration: 2-3 days   - Frequency: 1/month, 2-3 headache days/month   - Associated symptoms: +photophobia, denies phonophobia, sometimes has nausea and vomiting.  Gradual onset.  Denies aura. Denies vision changes associated.  Denies focal weakness associated.  Headaches improve with laying down but no significant change with position.  Worse with activity.     - Alleviating Factors: Laying down in the dark, sleep  - Triggers: Skipping meals, dehydrated   - Birth Control/Menses: Has not identified any relationship to menstrual cycle. Not on birth control. No chance could be pregnant. No active family planning.   - Co-morbidities: ADHD, depression, substance induced psychosis vs schizophreniform disorder.  Denies history of sleep apnea.  She does snore sometimes and reports having 3 episodes where she has stopped breathing in her sleep (someone told her).   - Risk Factors: She is uncertain about family history - adopted.    - Prior Workup: CT head WO unremarkable. Has never seen a neurologist before.   - Headache hygiene: Denies problems with sleep.  Denies regular use of OTC pain reliever.  Denies regular caffeine intake.  Stays well hydrated now.     - Prior Medications: Maybe rizatriptan (borrowed from adopted mom - helped)  - Current Medications: Sumatriptan 25 mg (prescribed but has not needed it yet)  - Prior Treatment: Denies   - Current Treatment: Denies     Has lost weight in the last year, went from ~145 to 119 today     Review of Records   - CT head WO unremarkable   - TTE normal     Past Medical History:  - left-sided hearing deficit, seasonal allergic " rhinitis, ADHD, depression, substance induced psychosis vs schizophreniform disorder, and low ferritin     Social History:  - Works at [employer].  Denies tobacco use, recreational drug use (prior cannabis use), occasional alcohol consumption     Allergies:  Allergies   Allergen Reactions    Gluten Meal        Physical Exam:  /66   Pulse 84   Wt 99.4 kg (219 lb 1.6 oz)   SpO2 98%   BMI 40.07 kg/m      General:  No acute distress  Cardiovascular: Normal rate.  Lung: Respirations are non-labored.  Extremities: Normal range of motion  Integumentary: Warm and dry    Neurologic:  Mental status : alert, fund of knowledge appropriate for visit    LANGUAGE: Speech fluent, no dysarthria     CN:  II- pupils equal and reactive, visual fields full, funduscopic exam no optic disc edema   III, IV, VI- extraocular movements intact  V- sensation intact bilaterally  VII- face symmetric  VIII- hearing intact, no nystagmus  IX, X- palate elevates symmetrically  XI- sternocleidomastoid 5/5  XII- tongue midline    MOTOR : intact bulk and tone  5/5 strength in all ext    SENSORY : intact to temp and vibration in BUE and BLE     REFLEXES:       Right Left   Triceps 1+ 1+   Biceps 1+ 1+   Brachioradialis 1+ 1+   Knee jerk 1+ 1+   Ankle jerk 1+ 1+   Kennedy absent   Toes down going     CEREBELLUM: finger to nose, finger taps, and heel to shin intact bilaterally     GAIT : Largely normal     Cognition and Speech: Speech clear and coherent.    Psychiatric: Cooperative, flat affect.     Assessment/Plan:   Amie Santiago is a 24 year old year old female who presents for evaluation of syncope and headaches.     Syncope  - Suspect orthostatic hypotension in the setting of possible dehydration and medication side effects.  Symptoms have improved/resolved with increased oral intact in the AM.  She had TTE and EKG that was unrevealing. Low suspicion for seizure activity but with reported significant weight loss and AM nausea, will go  ahead and obtain MRI brain.  Will also obtain EEG.  Encouraged continued liberal hydration and eating breakfast in the AM.  If MRI unremarkable, will defer to PCP concerning the weight loss. No indication for seizure precautions as she is 3 months from last syncopal event.   Plan  > MRI brain W/WO  > Routine EEG     Migraine without aura   - Headaches are migrainous in nature.  Suspect they have increased in frequency secondary to head trauma from syncopal event in 9/2024.  Don't think a preventative is indicated at this time. Did increase dose of sumatriptan that was prescribed by PCP.  Counseled on possible SE and appropriate way to take.  Plan  > Increase sumatriptan 25 mg --> 50 mg prn      Follow-up in 3 months     I spent 61 minutes providing care for this patient, including reviewing imaging, labs, and notes as well as providing counseling and coordination of care for the above issues.

## 2025-01-28 ENCOUNTER — TELEPHONE (OUTPATIENT)
Dept: NEUROLOGY | Facility: CLINIC | Age: 25
End: 2025-01-28
Payer: COMMERCIAL

## 2025-01-28 NOTE — TELEPHONE ENCOUNTER
Attempted to reach patient to remind them about appointment scheduled with  Neurology EEG on 1/29/25 in our [facility].    A voicemail was left with a call back number if the patient has questions or would like to reschedule.

## 2025-01-29 ENCOUNTER — ANCILLARY PROCEDURE (OUTPATIENT)
Dept: NEUROLOGY | Facility: CLINIC | Age: 25
End: 2025-01-29
Payer: COMMERCIAL

## 2025-01-29 DIAGNOSIS — R55 SYNCOPE, UNSPECIFIED SYNCOPE TYPE: ICD-10-CM

## 2025-01-29 PROCEDURE — 95700 EEG CONT REC W/VID EEG TECH: CPT | Performed by: STUDENT IN AN ORGANIZED HEALTH CARE EDUCATION/TRAINING PROGRAM

## 2025-01-30 ENCOUNTER — ANCILLARY PROCEDURE (OUTPATIENT)
Dept: MRI IMAGING | Facility: CLINIC | Age: 25
End: 2025-01-30
Attending: PSYCHIATRY & NEUROLOGY
Payer: COMMERCIAL

## 2025-01-30 DIAGNOSIS — R55 SYNCOPE, UNSPECIFIED SYNCOPE TYPE: ICD-10-CM

## 2025-01-30 PROCEDURE — A9585 GADOBUTROL INJECTION: HCPCS | Performed by: PSYCHIATRY & NEUROLOGY

## 2025-01-30 PROCEDURE — 70553 MRI BRAIN STEM W/O & W/DYE: CPT

## 2025-01-30 PROCEDURE — 255N000002 HC RX 255 OP 636: Performed by: PSYCHIATRY & NEUROLOGY

## 2025-01-30 RX ORDER — GADOBUTROL 604.72 MG/ML
5.5 INJECTION INTRAVENOUS ONCE
Status: COMPLETED | OUTPATIENT
Start: 2025-01-30 | End: 2025-01-30

## 2025-01-30 RX ADMIN — GADOBUTROL 5.5 ML: 604.72 INJECTION INTRAVENOUS at 10:36

## 2025-02-04 ENCOUNTER — PATIENT OUTREACH (OUTPATIENT)
Dept: CARE COORDINATION | Facility: CLINIC | Age: 25
End: 2025-02-04
Payer: COMMERCIAL

## 2025-02-05 NOTE — PROGRESS NOTES
Writer called patient to remind them of their [facility] appointment at 1020 and their [facility] appointemnt at 1100 on 2/6. There was no answer and writer left a voice message relaying this information.

## 2025-02-06 ENCOUNTER — OFFICE VISIT (OUTPATIENT)
Dept: PSYCHIATRY | Facility: CLINIC | Age: 25
End: 2025-02-06
Attending: PSYCHIATRY & NEUROLOGY
Payer: COMMERCIAL

## 2025-02-06 VITALS
BODY MASS INDEX: 21.88 KG/M2 | HEART RATE: 98 BPM | SYSTOLIC BLOOD PRESSURE: 86 MMHG | DIASTOLIC BLOOD PRESSURE: 59 MMHG | WEIGHT: 119.6 LBS

## 2025-02-06 DIAGNOSIS — F12.21 CANNABIS USE DISORDER, MODERATE, IN EARLY REMISSION (H): ICD-10-CM

## 2025-02-06 DIAGNOSIS — F29 PSYCHOSIS, UNSPECIFIED PSYCHOSIS TYPE (H): ICD-10-CM

## 2025-02-06 DIAGNOSIS — Z79.899 LONG TERM CURRENT USE OF ANTIPSYCHOTIC MEDICATION: ICD-10-CM

## 2025-02-06 DIAGNOSIS — F29 PSYCHOSIS, UNSPECIFIED PSYCHOSIS TYPE (H): Primary | ICD-10-CM

## 2025-02-06 DIAGNOSIS — E55.9 VITAMIN D DEFICIENCY: Primary | ICD-10-CM

## 2025-02-06 DIAGNOSIS — F90.9 ATTENTION DEFICIT HYPERACTIVITY DISORDER (ADHD), UNSPECIFIED ADHD TYPE: ICD-10-CM

## 2025-02-06 DIAGNOSIS — F33.42 MAJOR DEPRESSIVE DISORDER, RECURRENT EPISODE, IN FULL REMISSION: ICD-10-CM

## 2025-02-06 PROCEDURE — G2211 COMPLEX E/M VISIT ADD ON: HCPCS

## 2025-02-06 PROCEDURE — 96372 THER/PROPH/DIAG INJ SC/IM: CPT | Performed by: STUDENT IN AN ORGANIZED HEALTH CARE EDUCATION/TRAINING PROGRAM

## 2025-02-06 PROCEDURE — G0463 HOSPITAL OUTPT CLINIC VISIT: HCPCS

## 2025-02-06 PROCEDURE — 99214 OFFICE O/P EST MOD 30 MIN: CPT | Mod: GC

## 2025-02-06 RX ADMIN — PALIPERIDONE PALMITATE 156 MG: 156 INJECTION INTRAMUSCULAR at 10:52

## 2025-02-06 ASSESSMENT — PAIN SCALES - GENERAL: PAINLEVEL_OUTOF10: NO PAIN (0)

## 2025-02-06 NOTE — PROGRESS NOTES
"   [facility]  MEDICAL PROGRESS NOTE  Psychosis Treatment Team       CARE TEAM:    Therapist- Family education program with Jose Ventura.   Other (family therapy) Jonna Murphy at [facility] in [place]   Legal: Civil Commitment . Type of commitment: MI  Date of commitment: 10/30/23  Date of commitment expiration: 04/26/24. Order for continued commitment on 4/23/24.   Harris: Yes.   Medications authorized by Steven order: Haloperidol [HALDOL], Olanzapine [ZYPREXA], Paliperidone [INVEGA], and Risperidone [RISPERDAL].  Coy Martinez: No.   : Marisol: [phone]     Amie VERAS Pamela, who prefers being addressed as \"Irene\" is a 23 year old person who uses the pronouns she, her, hers.                   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 She was previously working with Dr. Soares on transitioning her from Olanzapine and Invega to Invega JAY, with the goal to ultimately get on Trinza. This is in the context of decompensating psychosis in 10/2023 resulting in [facility] admission and commitment with steven (see pertinent background for more info). Irene has notably had a history of psychiatric hospitalizations prior to this as well. They have also been working on cutting down on her cannabis use, which may have been a contributing factor to her psychosis.     I first met with Irene on 10/3/2024, at this time she was doing well with Invega PO, low dose scheduled Olanzapine and prn olanzapine. Since this visit, we stopped the scheduled PO medications and started Invega Sustenna. She is not receiving the " mainteinance 156mg doses. She has been doing well with the injections, has not utilized her prn Olanzapine. Recently, there was concern for syncopal episodes for which she followed up with in the ED and PCP and there was no clear etiology or acute process identified. It is possible that she may have been dehydrated, had poor po intake and she does work in an environment where she is standing up for a long period of time. We encouraged her to move around and increase PO intake especially before and during work. Antipsychotic medications do increase risk of orthostasis so we will continue to closely monitor from our end She does have an appointment with Neurology coming up  as well. She recently endorsed symptoms concerning for migraines and I encouraged Irene to bring this up at her upcoming neurology appointment. Otherwise Irene has been doing well, has not had recurrence of psychosis symptoms and mood appears to be euthymic. She has not had any return to cannabis use. Per her , commitment expires 4/2025.      Today, Irene continues to report doing well overall. Mood continues to be euthymic and she denies recurrence of psychosis symptoms. Denies return to cannabis use. She endorses an episode of lightheadedness in the context of a migraine. This did not seem to be orthostatic in nature though we will continue to follow closely. No falls. PCP has prescribed sumatriptan for migraines. Irene has a neurology appointment coming up. We will not make on medication changes today.     Irene does not appear to be at imminent risk of harming herself or anyone else at this visit.      Psychotropic Drug Interactions:  [PSYCHCLINICDDI]   Agents With Seizure Threshold Lowering Potential: Zyprexa, Invega  Management: limit med redundancy, routine monitoring, and patient aware of risks.     MNPMP was not checked today: will be checked next visit     Risk Statements:   Treatment Risk- Risks, benefits, alternatives and  potential adverse effects have been discussed and are understood.   Safety Risk-Irene did not appear to be an imminent safety risk to self or others.    PLAN    *Commitment -last renewed in 4/2024.  Per : Marisol: [phone]: She is on commitment 4/23/2025. *    *[id number] - [facility] [address] AT [address] [phone]. Injections to [facility]. *     1) Medications:   *No changes today*  -Continue 156mg Invega Sustenna Injection q 28days. You can start receiving this injection in our clinic starting 1/2025.   -Keep PRN Olanzapine 2.5-5mg daily as needed for breakthrough psychosis symptoms.   -Continue Vitamin D 50 mcg PO Qday.      Goal is to eventual monotherapy with Invega Trinza JAY.    2) Psychotherapy:   As indicated. Therapy with Jonna Murphy at [facility] in [place]. Hasn't seen in a few months. Agreeable to going back.      3) Next due:  Labs- Last obtained: 3/2024. Vitamin D ordered.   EKG- Last collected 9/2024 (see data section below)   Rating scales- AIMS: 1/25/24, Score = 0. Next due Jan/2025     4) Referrals: none     5) Other: None     6) Follow-up: 2 months  -Discussed scheduling injection and doing appointment every other visit.                  Interval History   Interval Events:  -Interview at 12:30 for a new derik. Working with elders.   -Things at group home ok.   -Saw mom yesterday.   -Stress hasn't really been higher.   -Hasn't gotten migraines in a month.     Mental Health:   -Mood:Good.   -Any periods of highs or lows: no .   -SI/HI: no.   -Anxiety: no.   -AVH, paranoia: no.     Therapy: Therapist is good but booked far out.      Substances:   No changes.   Cannabis: No return to use. Motivation is still to use car.   Nicotine: Everyday- vaping. Disposable. Goes through disposable in two weeks. Has cut down. Not interested in MAT at this time.  The same.   Alcohol : not in a  long time. Had one drink at [facility] but thought it was nasty.   Other substances: no .     Medications, Side Effects, Medical ROS:   -No issues with the injections   -Hasn't used prn olanzapine  -Prescribed Sumatriptan by PCP for migraines.    -Side effects:    -Lightheadedness:    -Has last week, because stood up too fast. But other than that no    -Bowel Movements: good-every other day. Discussed OTC miralax if getting worse. Doesn't feel constipated.    -EPS: No.     Plan:  -Trinza for next visit. Let LPNs  -next visit see her before injection.   -Monitor for commitment expiring-have an appointment in the window of this 4/2025. It may have to be within 2-3 weeks.   -Labs.         Current Social History:  -Currently working at a [employer]. Degree is in [employer].            -40 hours a week.            -Currently on a break from work due to 2x episodes of lightheadedness (one of which was at work).    -Housing: Has permanent housing. Adult Foster Care with [facility] residents. Loves it there. 4 people live there.   -Interests: Draw, paint, listen to music, go on walks.       Pertinent Substance Use:  [Last updated 12/06/24]  Alcohol: None since last last visit.   Cannabis use: No use since last visit. Mom will only let her use car if she abstains from cannabis use. Car is motivating for Irene.   Therapy: has a therapist.     Psychiatric and Medical ROS per HPI                  Pertinent Background  History of multiple psychiatric admissions. Last hospital admission was in 10/2023 at [facility] due to worsening psychotic symptoms and MI zeinal commitment with Harris order was pursued and granted. Irene was discharged to an IRTS facility after this. She has notably had multiple ED visits historically (and before this admission) where she was found to be intoxicated on alcohol and with cannabis. Concern that cannabis may have been contributing to psychosis.      She has since been working with Dr. Soares, during which olanzapine  "was decreased and Invega initiated and dose slowsly increased with goal to get off olanzapine and eventually on to invega systenna and ultimately invega trinza JAY.      Please see Dr. Soares 5/23/2024 visit for further details on clinical course since Irene's discharge from [facility].  \"  Pertinent Items Include: Psychosis, aggression, substance use: cannabis and psych hosp, MI commitment with Harrsi order. \"                  Physical Exam  (Vitals Only)    BP (!) 86/59   Pulse 98   Wt 54.3 kg (119 lb 9.6 oz)   BMI 21.88 kg/m    Pulse Readings from Last 3 Encounters:   02/06/25 98   01/08/25 88   11/21/24 106     Wt Readings from Last 3 Encounters:   02/06/25 54.3 kg (119 lb 9.6 oz)   01/20/25 54 kg (119 lb 1.6 oz)   01/08/25 56.3 kg (124 lb 3.2 oz)     BP Readings from Last 3 Encounters:   02/06/25 (!) 86/59   01/08/25 97/66   11/21/24 102/70                      Mental Status Exam    Alertness: alert  and oriented  Appearance: well groomed  Behavior/Demeanor: pleasant, calm, and guarded, with intermittent eye contact.   Speech: normal and regular rate and rhythm  Language: intact and no obvious problem  Psychomotor: normal or unremarkable  Mood:  \"good\"  Affect: full range for topics discussed, congruent to: mood- yes, content- yes  Thought Process/Associations: linear, goal directed   Thought Content:  Reports none;  Denies suicidal & violent ideation   Perception:  Reports none;  Denies auditory hallucinations, visual hallucinations, paranoia; does not appear to be responding to internal stimuli  Insight: adequate and fair  Judgment: good  Cognition: does  appear grossly intact; formal cognitive testing was not done  Gait and Station: unremarkable                 Past Psychotropic Medication Trials     Medication Max Dose (mg) Dates / Duration Helpful? DC Reason / Adverse Effects?   lexapro 20 1 year.Discont  May 2023  N Has not helped at all.   olanzapine  20 mg    Y Dose increased from 10 to 20 mg daily dose " during Oct/2023 hospitalization    Prozac   2021   Somewhat helpful, but caused mood instability, vivid dreams    Wellbutrin  150 mg   Discontinued October 2023  N                             Past Medical History     Patient Active Problem List   Diagnosis    Adopted 2/07 from [facility]    ADHD, predominantly inattentive type    Seasonal allergic rhinitis    Hearing deficit, left    Low ferritin    Acute nonintractable headache, unspecified headache type    Major depressive disorder with single episode, in full remission    Discoloration of eye    Concussion without loss of consciousness, initial encounter    Recurrent major depressive disorder                     Medications     Current Outpatient Medications   Medication Sig Dispense Refill    OLANZapine (ZYPREXA) 2.5 MG tablet Take 1-2 tablets (2.5-5 mg) by mouth daily as needed (for breakthrough hallucinations and anxiety associated with hallucinations.). 60 tablet 1    SUMAtriptan (IMITREX) 50 MG tablet Take 1 tablet (50 mg) by mouth at onset of headache for migraine. May repeat in 2 hours. Max 8 tablets/24 hours. 9 tablet 5    vitamin D3 (CHOLECALCIFEROL) 50 mcg (2000 units) tablet Take 1 tablet (50 mcg) by mouth daily. 30 tablet 2                     Data         3/7/2024     2:25 PM 10/3/2024     8:36 AM 2/6/2025    10:15 AM   PROMIS-10 Total Score w/o Sub Scores   PROMIS TOTAL - SUBSCORES 34 36 38        Proxy-reported         10/3/2024     8:35 AM 11/21/2024    10:19 AM 2/6/2025    10:13 AM   PHQ-9 SCORE   PHQ-9 Total Score MyChart 0 0 0   PHQ-9 Total Score 0 0  0        Patient-reported    Proxy-reported         2/27/2019     1:42 PM 4/28/2021     3:30 PM   DAMIÁN-7 SCORE   Total Score 1 0       Liver/Kidney Function, TSH Metabolic Blood counts   Recent Labs   Lab Test 09/13/24  1736 03/26/24  0919 05/09/23  1417   AST  --  23  --    ALT  --  11  --    ALKPHOS  --  82  --    CR 0.71  --  0.83     Recent Labs   Lab Test 09/13/24  1736   TSH 1.32    Recent Labs    Lab Test 24  0919   CHOL 151   TRIG 60   LDL 88   HDL 51     Recent Labs   Lab Test 24  0919   A1C 5.1     Recent Labs   Lab Test 24  1736   GLC 93    Recent Labs   Lab Test 24  1736   WBC 5.8   HGB 11.5*   HCT 33.5*   MCV 87            EK2024: Qtc: 421     Level of Medical Decision Making:   - At least 1 chronic problem that is not stable  - Engaged in prescription drug management during visit (discussed any medication benefits, side effects, alternatives, etc.)       The longitudinal plan of care for the diagnosis(es)/condition(s) as documented were addressed during this visit. Due to the added complexity in care, I will continue to support Irene in the subsequent management and with ongoing continuity of care.      PROVIDER:   Ariadna Gamez MD  PGY-3 Psychiatry  [facility]     Patient not staffed, will discuss at supervision.

## 2025-02-06 NOTE — PATIENT INSTRUCTIONS
Medication Changes:   -We'll plan for the transition to the 3 month Trinza injection at your next visit.     Other:   -Please have labs collected at any [place] facility.

## 2025-02-06 NOTE — NURSING NOTE
Clinic Administered Medication Documentation      Injectable Medication Documentation    Is there an active order (written within the past 365 days, with administrations remaining, not ) in the chart? Yes.     Patient was given  Invega Sustenna 156 mg . Prior to medication administration, verified patient's identity using patient s name and date of birth. Please see MAR and medication order for additional information. Patient instructed to  patient has a MFU appointment with the provider after JAY .    Vial/Syringe: Syringe  Was this medication supplied by the patient? No  Is this a medication the patient will need to receive again? Yes. Verified that the patient has refills remaining in their prescription.        OBSERVATIONS:  1.  Appearance: Casually dressed and Well groomed  2.  Mood: calm, friendly, and pleasant  3.  Affect: full range  4.  Attitude: pleasant and cooperative  5.  Cooperation: Participated voluntarily  6.  Safety denies any current safety concerns including suicidal ideation, self-harm, and homicidal ideation  7.  Side Effects: none

## 2025-02-06 NOTE — NURSING NOTE
Chief Complaint   Patient presents with    Recheck Medication     Cannabis use disorder, moderate, in early remission     - Yogesh Cali, Visit Facilitator

## 2025-02-07 ENCOUNTER — TELEPHONE (OUTPATIENT)
Dept: PSYCHIATRY | Facility: CLINIC | Age: 25
End: 2025-02-07

## 2025-02-07 NOTE — TELEPHONE ENCOUNTER
Payam Barraza Radhika, RN; Raquel rAias LPN; P Cam ; Ariadna Gamez MD Hello,    Tyrese Yan authorized;    No [employer] Policy. No PA REQ regardless of DX (does not have to meet FDA/NCCN).    Patient is okay to receive at clinic.    Thanks!    Payam

## 2025-02-11 ENCOUNTER — LAB (OUTPATIENT)
Dept: LAB | Facility: CLINIC | Age: 25
End: 2025-02-11
Payer: COMMERCIAL

## 2025-02-11 DIAGNOSIS — Z79.899 LONG TERM CURRENT USE OF ANTIPSYCHOTIC MEDICATION: ICD-10-CM

## 2025-02-11 DIAGNOSIS — E55.9 VITAMIN D DEFICIENCY: ICD-10-CM

## 2025-02-11 LAB
ALBUMIN SERPL BCG-MCNC: 4 G/DL (ref 3.5–5.2)
ALP SERPL-CCNC: 68 U/L (ref 40–150)
ALT SERPL W P-5'-P-CCNC: 11 U/L (ref 0–50)
ANION GAP SERPL CALCULATED.3IONS-SCNC: 10 MMOL/L (ref 7–15)
AST SERPL W P-5'-P-CCNC: 21 U/L (ref 0–45)
BASOPHILS # BLD AUTO: 0 10E3/UL (ref 0–0.2)
BASOPHILS NFR BLD AUTO: 1 %
BILIRUB SERPL-MCNC: 0.3 MG/DL
BUN SERPL-MCNC: 8.4 MG/DL (ref 6–20)
CALCIUM SERPL-MCNC: 9.4 MG/DL (ref 8.8–10.4)
CHLORIDE SERPL-SCNC: 104 MMOL/L (ref 98–107)
CHOLEST SERPL-MCNC: 126 MG/DL
CREAT SERPL-MCNC: 0.68 MG/DL (ref 0.51–0.95)
EGFRCR SERPLBLD CKD-EPI 2021: >90 ML/MIN/1.73M2
EOSINOPHIL # BLD AUTO: 0.1 10E3/UL (ref 0–0.7)
EOSINOPHIL NFR BLD AUTO: 2 %
ERYTHROCYTE [DISTWIDTH] IN BLOOD BY AUTOMATED COUNT: 12.1 % (ref 10–15)
EST. AVERAGE GLUCOSE BLD GHB EST-MCNC: 103 MG/DL
FASTING STATUS PATIENT QL REPORTED: YES
FASTING STATUS PATIENT QL REPORTED: YES
GLUCOSE SERPL-MCNC: 95 MG/DL (ref 70–99)
HBA1C MFR BLD: 5.2 % (ref 0–5.6)
HCO3 SERPL-SCNC: 25 MMOL/L (ref 22–29)
HCT VFR BLD AUTO: 38.1 % (ref 35–47)
HDLC SERPL-MCNC: 47 MG/DL
HGB BLD-MCNC: 12.7 G/DL (ref 11.7–15.7)
IMM GRANULOCYTES # BLD: 0 10E3/UL
IMM GRANULOCYTES NFR BLD: 0 %
LDLC SERPL CALC-MCNC: 72 MG/DL
LYMPHOCYTES # BLD AUTO: 1.8 10E3/UL (ref 0.8–5.3)
LYMPHOCYTES NFR BLD AUTO: 42 %
MCH RBC QN AUTO: 29.2 PG (ref 26.5–33)
MCHC RBC AUTO-ENTMCNC: 33.3 G/DL (ref 31.5–36.5)
MCV RBC AUTO: 88 FL (ref 78–100)
MONOCYTES # BLD AUTO: 0.3 10E3/UL (ref 0–1.3)
MONOCYTES NFR BLD AUTO: 6 %
NEUTROPHILS # BLD AUTO: 2.1 10E3/UL (ref 1.6–8.3)
NEUTROPHILS NFR BLD AUTO: 49 %
NONHDLC SERPL-MCNC: 79 MG/DL
PLATELET # BLD AUTO: 206 10E3/UL (ref 150–450)
POTASSIUM SERPL-SCNC: 4.1 MMOL/L (ref 3.4–5.3)
PROT SERPL-MCNC: 7.1 G/DL (ref 6.4–8.3)
RBC # BLD AUTO: 4.35 10E6/UL (ref 3.8–5.2)
SODIUM SERPL-SCNC: 139 MMOL/L (ref 135–145)
TRIGL SERPL-MCNC: 37 MG/DL
VIT D+METAB SERPL-MCNC: 53 NG/ML (ref 20–50)
WBC # BLD AUTO: 4.3 10E3/UL (ref 4–11)

## 2025-02-11 PROCEDURE — 36415 COLL VENOUS BLD VENIPUNCTURE: CPT

## 2025-02-11 PROCEDURE — 82306 VITAMIN D 25 HYDROXY: CPT

## 2025-02-11 PROCEDURE — 80053 COMPREHEN METABOLIC PANEL: CPT

## 2025-02-11 PROCEDURE — 85025 COMPLETE CBC W/AUTO DIFF WBC: CPT

## 2025-02-11 PROCEDURE — 83036 HEMOGLOBIN GLYCOSYLATED A1C: CPT

## 2025-02-11 PROCEDURE — 80061 LIPID PANEL: CPT

## 2025-02-12 DIAGNOSIS — E55.9 VITAMIN D DEFICIENCY: ICD-10-CM

## 2025-02-12 RX ORDER — CHOLECALCIFEROL (VITAMIN D3) 50 MCG
1 TABLET ORAL DAILY
Qty: 30 TABLET | Refills: 2 | Status: SHIPPED | OUTPATIENT
Start: 2025-02-12 | End: 2025-02-13

## 2025-02-12 RX ORDER — VITAMIN B COMPLEX
25 TABLET ORAL DAILY
Qty: 30 TABLET | Refills: 2 | Status: SHIPPED | OUTPATIENT
Start: 2025-02-12

## 2025-02-12 NOTE — TELEPHONE ENCOUNTER
Medication requested:  vitamin D3 (CHOLECALCIFEROL) 50 mcg (2000 units) tablet   vitamin D3 (CHOLECALCIFEROL) 50 mcg (2000 units) tablet 30 tablet 2 11/18/2024 -- No   Sig - Route: Take 1 tablet (50 mcg) by mouth daily. - Oral     Last refilled: 11/18/24  Qty: 30/2      Last seen: 2/6/25 Vera not complete  RTC: 4 weeks  Cancel: 0  No-show: 0  Next appt: 3/5/25    Refill decision:   Refill pended and routed to the provider for review/determination due to  vitamin D3 (CHOLECALCIFEROL) 50 mcg (2000 units) tablet  not on protocol.2/6/25 note  incomplete

## 2025-02-13 ENCOUNTER — TELEPHONE (OUTPATIENT)
Dept: PSYCHIATRY | Facility: CLINIC | Age: 25
End: 2025-02-13
Payer: COMMERCIAL

## 2025-02-13 NOTE — TELEPHONE ENCOUNTER
----- Message from Payam ORTA sent at 2/7/2025  9:01 AM CST -----  Tyrese Galloway authorized;    No Ucare Policy. No PA REQ regardless of DX (does not have to meet FDA/NCCN).    Patient is okay to receive at clinic.    Thanks!    Payam  ----- Message -----  From: Terrie Perez, JUAN  Sent: 2/6/2025   5:06 PM CST  To: Ariadna Gamez MD; Payam Italian; #    Hi,     Can you please check if patient is covered to receive Trinza in clinic. Let us know if you need further clarification.     Raquel- just an fyi    Thanks,   Terrie  ----- Message -----  From: Ariadna Gamez MD  Sent: 2/6/2025   4:57 PM CST  To: Psychiatry Nurse-Ump    Hello,     We are hoping to transition Irene to Trinza 546mg dose for her next JAY injection in one month. I discontinued the sustenna 156mg order and put in the Trinza order which I think needs a prior auth. Please let me know if there are any issues or if there is any other information you need from me.     She is scheduled to meet with me on 3/6/25. I am hoping to meet with her first before she gets the injection.       Thank You,   Ariadna

## 2025-02-13 NOTE — TELEPHONE ENCOUNTER
Insurance approval received to start Invega Trinza 546 mg IM Injection, Q90D. See below.Raquel Arias LPN Lead

## 2025-02-18 ENCOUNTER — PATIENT OUTREACH (OUTPATIENT)
Dept: CARE COORDINATION | Facility: CLINIC | Age: 25
End: 2025-02-18
Payer: COMMERCIAL

## 2025-03-05 NOTE — PROGRESS NOTES
Writer called patient to remind them of their MFU appointment at 0750 and their JAY appointemnt at 0830 on 3/6. There was no answer and writer left a voice message relaying this information.

## 2025-03-06 ENCOUNTER — OFFICE VISIT (OUTPATIENT)
Dept: PSYCHIATRY | Facility: CLINIC | Age: 25
End: 2025-03-06
Attending: STUDENT IN AN ORGANIZED HEALTH CARE EDUCATION/TRAINING PROGRAM
Payer: COMMERCIAL

## 2025-03-06 ENCOUNTER — TELEPHONE (OUTPATIENT)
Dept: PSYCHIATRY | Facility: CLINIC | Age: 25
End: 2025-03-06
Payer: COMMERCIAL

## 2025-03-06 ENCOUNTER — ALLIED HEALTH/NURSE VISIT (OUTPATIENT)
Dept: PSYCHIATRY | Facility: CLINIC | Age: 25
End: 2025-03-06
Payer: COMMERCIAL

## 2025-03-06 VITALS
BODY MASS INDEX: 22.13 KG/M2 | HEART RATE: 68 BPM | WEIGHT: 121 LBS | DIASTOLIC BLOOD PRESSURE: 60 MMHG | SYSTOLIC BLOOD PRESSURE: 89 MMHG

## 2025-03-06 DIAGNOSIS — F29 PSYCHOSIS, UNSPECIFIED PSYCHOSIS TYPE (H): Primary | ICD-10-CM

## 2025-03-06 DIAGNOSIS — Z79.899 LONG TERM CURRENT USE OF ANTIPSYCHOTIC MEDICATION: ICD-10-CM

## 2025-03-06 DIAGNOSIS — F33.42 MAJOR DEPRESSIVE DISORDER, RECURRENT EPISODE, IN FULL REMISSION: ICD-10-CM

## 2025-03-06 DIAGNOSIS — F12.90 CANNABIS USE DISORDER: ICD-10-CM

## 2025-03-06 PROCEDURE — 99214 OFFICE O/P EST MOD 30 MIN: CPT | Mod: GC

## 2025-03-06 PROCEDURE — 96372 THER/PROPH/DIAG INJ SC/IM: CPT | Performed by: PSYCHIATRY & NEUROLOGY

## 2025-03-06 PROCEDURE — G2211 COMPLEX E/M VISIT ADD ON: HCPCS

## 2025-03-06 ASSESSMENT — PAIN SCALES - GENERAL: PAINLEVEL_OUTOF10: NO PAIN (0)

## 2025-03-06 NOTE — TELEPHONE ENCOUNTER
Ariadna Gamez MD  You1 minute ago (4:52 PM)     RAMIRO Falcon,    I just saw this telephone encounter after seeing the [employer] message. I responded to her directly. It could potentially be related to the trinza though I would anticipate it will get better.  I told her to let us know if she keeps having migraines.    Ariadna     Please see [employer] message encounter from 3/6 for further follow up

## 2025-03-06 NOTE — TELEPHONE ENCOUNTER
[facility] Call Center    Phone Message    May a detailed message be left on voicemail: yes     Reason for Call: Other: Pt stated she has a headache after getting her injection this morning. She stated it getting better but wanted to speak with a nurse.      Action Taken: Other: [facility] psych pool    Travel Screening: Not Applicable     Date of Service:

## 2025-03-06 NOTE — PROGRESS NOTES
"   [facility]  MEDICAL PROGRESS NOTE  Psychosis Treatment Team       CARE TEAM:    Therapist- Family education program with Jose Ventura.   Other (family therapy) Jonna Murphy at [facility] in [place]   Legal: Civil Commitment . Type of commitment: MI  Date of initial commitment: 10/30/23, continued committed order 4/23/24, expires April 2025  Harris: Yes.   Medications authorized by Harris order: Haloperidol [HALDOL], Olanzapine [ZYPREXA], Paliperidone [INVEGA], and Risperidone [RISPERDAL].  Coy Martinez: No.   : Marisol: [phone]     Amie Santiago, who prefers being addressed as \"Irene\" is a 24 year old person who uses the pronouns she, her, hers.                   Diagnoses  Psychosis, unspecified   -Substance-induced psychosis vs Schizophreniform disorder/primary psychotic disorder  ADHD (by history)  Major depressive disorder, (by history)               -Full remission  Cannabis use disorder, in early remission     Assessment & Plan   Irene is a 23 yo female who carries the above psychiatric diagnoses. She was previously working with Dr. Soares on transitioning her from Olanzapine and Invega to Invega JAY, with the goal to ultimately get on Trinza. This is in the context of decompensating psychosis in 10/2023 resulting in [facility] admission and commitment with nelson (see pertinent background for more info). Irene has notably had a history of psychiatric hospitalizations prior to this as well. They have also been working on cutting down on her cannabis use, which may have been a contributing factor to her psychosis.     I first met with Irene on 10/3/2024, at this time she was doing well with Invega PO, low dose scheduled Olanzapine and prn olanzapine. Since this visit, we stopped the scheduled PO medications and started Invega Sustenna. Sh has been receiving the mainteinance 156mg doses. She has been doing well with " the injections, has not utilized her prn Olanzapine.  Per her , commitment expires 4/2025.      Today, Irene continues to report doing well overall. Mood continues to be euthymic and she denies recurrence of psychosis symptoms. Denies return to cannabis use. . As Irene has been tolerating the Invega Sustenna and has had several doses to date,she is agreeable to switching to Trinza. We will plan to see her prior to getting this injection in about a month. Additionally, we will plan to see Irene soon before her commitment expires in 4/2025.     Irene does not appear to be at imminent risk of harming herself or anyone else at this visit.      Psychotropic Drug Interactions:  [PSYCHCLINICDDI]   Agents With Seizure Threshold Lowering Potential: Zyprexa, Invega  Management: limit med redundancy, routine monitoring, and patient aware of risks.     MNPMP was not checked today: will be checked next visit     Risk Statements:   Treatment Risk- Risks, benefits, alternatives and potential adverse effects have been discussed and are understood.   Safety Risk-Irene did not appear to be an imminent safety risk to self or others.    PLAN    *Commitment -last renewed in 4/2024.  Per : Marisol: [phone]: She is on commitment 4/23/2025. *    *[id number] - [address] AT [address] [phone]. Injections to [facility]. *     1) Medications:   -Start Invega Trinza 546 mg on 3/6/25. We will plan to see Irene before she gets this first injection.   -Stop 156mg Invega Sustenna Injection q 28days.   -Keep PRN Olanzapine 2.5-5mg daily as needed for breakthrough psychosis symptoms.   -Continue Vitamin D 50 mcg PO Qday.      2) Psychotherapy:   As indicated. Therapy with Jonna Murphy at [facility] in [place]. Hasn't seen in a few months. In the process of setting up an appointment here-booked out.      3) Next  due:  Labs- Last obtained: 3/2024. Vitamin D ordered.   EKG- Last collected 9/2024 (see data section below)   Rating scales- AIMS: 1/25/24, Score = 0. Next due Jan/2025     4) Referrals: none     5) Other: Monitor for commitment expiring-we will plan to have an appointment in the window of this 4/2025. It may have to be within 2-3 weeks.      6) Follow-up: 1 month.                 Interval History   -Got a new job at [employer]. Likes it there. 20 hours.   -Things have been good at group home.   -Might go to [facility] on [address].   -Might go to [place], [place] for a friends graduation, grandparents.   -Has some friends that she sees.   -Saw mom yesterday.   - Mood: good.   -No periods of  highs or lows.   -No AVH, paranoia, feeling safe.   -Anxiety; No  -SI/Hi: No.     -No cannabis use.   -No alcohol  -Nothing else.     -Therapy: working on getting this set unit(s).     Side Effects:  -No.   -No episodes  of lightheadedness-related to migraines before.   -Bowel Movements: Regularly  -No muscle stiffness or anything.       Feeling good about getting the trinza.   Discussed commitment:       Last visit:   Interval Events:  -Interview at 12:30 for a new job. 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   -Things at group home ok.   -Saw mom yesterday.   -Stress hasn't really been higher.   -Hasn't gotten migraines in a month.     Mental Health:   -Mood:Good.   -Any periods of highs or lows: no .   -SI/HI: no.   -Anxiety: no.   -AVH, paranoia: no.     Therapy: Therapist is good but booked far out. Going to try and meet with her.      Medications, Side Effects, Medical ROS:   -No issues with the injections   -Hasn't used prn olanzapine  -Prescribed Sumatriptan by PCP for migraines.    -Side effects:    -Lightheadedness:    -Has last week, because stood up too fast. But other than that no    -Bowel Movements: good-every other day. Discussed OTC miralax if getting worse. Doesn't feel constipated.    -EPS: No.     Current Social  "History:  -Previously working at a [employer]. 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           -40 hours a week.            -Currently on a break from work due to 2x episodes of lightheadedness (one of which was at work).    -Housing: Has permanent housing. Adult Foster Care with [place] residents. Loves it there. 4 people live there.   -Interests: Draw, paint, listen to music, go on walks.       Pertinent Substance Use:    Updated this visit- no notable changes.     Alcohol: None since last last visit.   Cannabis use: No use since last visit. Mom will only let her use car if she abstains from cannabis use. Car is motivating for Irene.   Nicotine: Everyday- vaping. Disposable. Goes through disposable in two weeks. Has cut down. Not interested in MAT at this time.    Other substances :No    Therapy: has a therapist.     Psychiatric and Medical ROS per HPI                  Pertinent Background  History of multiple psychiatric admissions. Last hospital admission was in 10/2023 at [facility] due to worsening psychotic symptoms and MI cilvil commitment with Harris order was pursued and granted. Irene was discharged to an IRTS facility after this. She has notably had multiple ED visits historically (and before this admission) where she was found to be intoxicated on alcohol and with cannabis. Concern that cannabis may have been contributing to psychosis.      She has since been working with Dr. Soares, during which olanzapine was decreased and Invega initiated and dose slowsly increased with goal to get off olanzapine and eventually on to invega systenna and ultimately invega trinza JAY.      Please see Dr. Soares 5/23/2024 visit for further details on clinical course since Irene's discharge from [facility].  \"  Pertinent Items Include: Psychosis, aggression, substance use: cannabis and psych hosp, MI commitment with Harris order. \"                  Physical Exam  (Vitals Only)    There " "were no vitals taken for this visit.  Pulse Readings from Last 3 Encounters:   02/06/25 98   01/08/25 88   11/21/24 106     Wt Readings from Last 3 Encounters:   02/06/25 54.3 kg (119 lb 9.6 oz)   01/20/25 54 kg (119 lb 1.6 oz)   01/08/25 56.3 kg (124 lb 3.2 oz)     BP Readings from Last 3 Encounters:   02/06/25 (!) 86/59   01/08/25 97/66   11/21/24 102/70                      Mental Status Exam    Alertness: alert  and oriented  Appearance: well groomed  Behavior/Demeanor: pleasant, calm, and guarded, with intermittent eye contact.   Speech: normal and regular rate and rhythm  Language: intact and no obvious problem  Psychomotor: normal or unremarkable  Mood:  \"good\"  Affect: full range for topics discussed, congruent to: mood- yes, content- yes  Thought Process/Associations: linear, logical, goal directed   Thought Content:  Reports none;  Denies suicidal & violent ideation   Perception:  Reports none;  Denies auditory hallucinations, visual hallucinations, paranoia; does not appear to be responding to internal stimuli  Insight: adequate and fair  Judgment: good  Cognition: does  appear grossly intact; formal cognitive testing was not done  Gait and Station: unremarkable                 Past Psychotropic Medication Trials     Medication Max Dose (mg) Dates / Duration Helpful? DC Reason / Adverse Effects?   lexapro 20 1 year.Discont  May 2023  N Has not helped at all.   olanzapine  20 mg    Y Dose increased from 10 to 20 mg daily dose during Oct/2023 hospitalization    Prozac   2021   Somewhat helpful, but caused mood instability, vivid dreams    Wellbutrin  150 mg   Discontinued October 2023  N                             Past Medical History     Patient Active Problem List   Diagnosis    Adopted 2/07 from [facility]    ADHD, predominantly inattentive type    Seasonal allergic rhinitis    Hearing deficit, left    Low ferritin    Acute nonintractable headache, unspecified headache type    Major depressive disorder " with single episode, in full remission    Discoloration of eye    Concussion without loss of consciousness, initial encounter    Recurrent major depressive disorder                     Medications     Current Outpatient Medications   Medication Sig Dispense Refill    OLANZapine (ZYPREXA) 2.5 MG tablet Take 1-2 tablets (2.5-5 mg) by mouth daily as needed (for breakthrough hallucinations and anxiety associated with hallucinations.). 60 tablet 1    SUMAtriptan (IMITREX) 50 MG tablet Take 1 tablet (50 mg) by mouth at onset of headache for migraine. May repeat in 2 hours. Max 8 tablets/24 hours. 9 tablet 5    Vitamin D3 (CHOLECALCIFEROL) 25 mcg (1000 units) tablet Take 1 tablet (25 mcg) by mouth daily. 30 tablet 2                     Data         3/7/2024     2:25 PM 10/3/2024     8:36 AM 2025    10:15 AM   PROMIS-10 Total Score w/o Sub Scores   PROMIS TOTAL - SUBSCORES 34 36 38        Proxy-reported         10/3/2024     8:35 AM 2024    10:19 AM 2025    10:13 AM   PHQ-9 SCORE   PHQ-9 Total Score MyChart 0 0 0   PHQ-9 Total Score 0 0  0        Patient-reported    Proxy-reported         2019     1:42 PM 2021     3:30 PM   DAMIÁN-7 SCORE   Total Score 1 0       Liver/Kidney Function, TSH Metabolic Blood counts   Recent Labs   Lab Test 25  0910 24  1736   AST 21  --    ALT 11  --    ALKPHOS 68  --    CR 0.68 0.71     Recent Labs   Lab Test 24  1736   TSH 1.32    Recent Labs   Lab Test 25  0910   CHOL 126   TRIG 37   LDL 72   HDL 47*     Recent Labs   Lab Test 25  0910   A1C 5.2     Recent Labs   Lab Test 25  0910   GLC 95    Recent Labs   Lab Test 25  0910   WBC 4.3   HGB 12.7   HCT 38.1   MCV 88            EK2024: Qtc: 421     Level of Medical Decision Making:   - At least 1 chronic problem that is not stable  - Engaged in prescription drug management during visit (discussed any medication benefits, side effects, alternatives, etc.)       The  longitudinal plan of care for the diagnosis(es)/condition(s) as documented were addressed during this visit. Due to the added complexity in care, I will continue to support Irene in the subsequent management and with ongoing continuity of care.      PROVIDER:   Ariadna Gamez MD  PGY-3 Psychiatry  [facility]     Patient staffed in clinic with Dr. Whitman who will sign the note.  Supervisor is Dr. Huber.

## 2025-03-06 NOTE — NURSING NOTE
"Clinic Administered Medication Documentation      Injectable Medication Documentation    Is there an active order (written within the past 365 days, with administrations remaining, not ) in the chart? Yes.     Patient was given  Invega Trinza 546 mg . Prior to medication administration, verified patient's identity using patient s name and date of birth. Please see MAR and medication order for additional information. Patient instructed to  remain in clinic to see provider after injection .    Vial/Syringe: Syringe  Was this medication supplied by the patient? No  Is this a medication the patient will need to receive again? Yes. Verified that the patient has refills remaining in their prescription.        OBSERVATIONS:  1.  Appearance: Casually dressed, Adequately groomed, and Dressed appropriately for weather  2.  Mood: \"good\"  3.  Affect: full range  4.  Attitude: pleasant and cooperative  5.  Cooperation: Participated voluntarily  6.  Safety denies any current safety concerns including suicidal ideation, self-harm, and homicidal ideation  7.  Side Effects: none    Hubert Torres LPN                "

## 2025-03-06 NOTE — TELEPHONE ENCOUNTER
Returned a call to patient, to obtain additional information. Patient is wanting to know if headache is one of the side effects of Invega Trinza.  Patient took Imitrix for headaches as she is prone to them and reports the pain is improving. Updated her to call the clinic if pain continues to get worse or come into the ED if not tolerable. Patient verbalized understanding.

## 2025-03-07 DIAGNOSIS — E55.9 VITAMIN D DEFICIENCY: ICD-10-CM

## 2025-03-10 ENCOUNTER — TELEPHONE (OUTPATIENT)
Dept: PSYCHIATRY | Facility: CLINIC | Age: 25
End: 2025-03-10
Payer: COMMERCIAL

## 2025-03-10 NOTE — TELEPHONE ENCOUNTER
Writer called the patient at [phone] and left a brief voicemail asking them to call the clinic to schedule their next injection appointment which is due around 6/6/2025. Hubert Torres LPN

## 2025-03-11 RX ORDER — VITAMIN B COMPLEX
1 TABLET ORAL DAILY
Qty: 28 TABLET | Refills: 2 | OUTPATIENT
Start: 2025-03-11

## 2025-03-11 NOTE — TELEPHONE ENCOUNTER
Date of Last Office Visit: 3/6/2025  [facility]      RTC: 5 wks  No shows: 0  Cancellations: 0  Date of Next Office Visit:    4/10/2025 10:20 AM (30 min)  [facility]   Arrive by: 10:05 AM   PSYCHOSIS RETURN   URPSY ([facility])   Ariadna Gamez MD     ------------------------------    Last Script Details::     Disp Refills Start End TUAN   Vitamin D3 (CHOLECALCIFEROL) 25 mcg (1000 units) tablet 30 tablet 2 2/12/2025 -- No   Sig - Route: Take 1 tablet (25 mcg) by mouth daily. - Oral   [facility] [address]   ------------------------------  Refused request - refills on file.

## 2025-03-18 DIAGNOSIS — E55.9 VITAMIN D DEFICIENCY: ICD-10-CM

## 2025-03-19 RX ORDER — VITAMIN B COMPLEX
1 TABLET ORAL DAILY
Qty: 28 TABLET | Refills: 2 | OUTPATIENT
Start: 2025-03-19

## 2025-03-19 NOTE — TELEPHONE ENCOUNTER
Vitamin D3 (CHOLECALCIFEROL) 25 mcg (1000 units) tablet 30 tablet 2 2/12/2025   [facility] on 2/12/25#30/2 Rf . Refills on file

## 2025-03-28 NOTE — TELEPHONE ENCOUNTER
Writer sent a [employer] message to patient asking they please call the clinic to schedule their next injection appointment.     Hubert Torres LPN

## 2025-03-29 ENCOUNTER — HEALTH MAINTENANCE LETTER (OUTPATIENT)
Age: 25
End: 2025-03-29

## 2025-04-04 DIAGNOSIS — E55.9 VITAMIN D DEFICIENCY: ICD-10-CM

## 2025-04-07 RX ORDER — VITAMIN B COMPLEX
1 TABLET ORAL
Qty: 30 TABLET | Refills: 3 | OUTPATIENT
Start: 2025-04-07

## 2025-04-10 ENCOUNTER — OFFICE VISIT (OUTPATIENT)
Dept: PSYCHIATRY | Facility: CLINIC | Age: 25
End: 2025-04-10
Attending: STUDENT IN AN ORGANIZED HEALTH CARE EDUCATION/TRAINING PROGRAM
Payer: COMMERCIAL

## 2025-04-10 VITALS
SYSTOLIC BLOOD PRESSURE: 94 MMHG | HEART RATE: 71 BPM | WEIGHT: 123.6 LBS | DIASTOLIC BLOOD PRESSURE: 64 MMHG | BODY MASS INDEX: 22.61 KG/M2

## 2025-04-10 DIAGNOSIS — F29 PSYCHOSIS, UNSPECIFIED PSYCHOSIS TYPE (H): Primary | ICD-10-CM

## 2025-04-10 DIAGNOSIS — F29 PSYCHOSIS, UNSPECIFIED PSYCHOSIS TYPE (H): ICD-10-CM

## 2025-04-10 DIAGNOSIS — F19.959 OTHER PSYCHOACTIVE SUBSTANCE USE, UNSPECIFIED WITH PSYCHOACTIVE SUBSTANCE-INDUCED PSYCHOTIC DISORDER, UNSPECIFIED (H): Primary | ICD-10-CM

## 2025-04-10 DIAGNOSIS — F12.91 CANNABIS USE DISORDER IN REMISSION: ICD-10-CM

## 2025-04-10 DIAGNOSIS — F90.9 ATTENTION DEFICIT HYPERACTIVITY DISORDER (ADHD), UNSPECIFIED ADHD TYPE: ICD-10-CM

## 2025-04-10 DIAGNOSIS — F33.42 MAJOR DEPRESSIVE DISORDER, RECURRENT EPISODE, IN FULL REMISSION: ICD-10-CM

## 2025-04-10 PROCEDURE — 1126F AMNT PAIN NOTED NONE PRSNT: CPT

## 2025-04-10 PROCEDURE — G0463 HOSPITAL OUTPT CLINIC VISIT: HCPCS

## 2025-04-10 PROCEDURE — 3078F DIAST BP <80 MM HG: CPT

## 2025-04-10 PROCEDURE — 3074F SYST BP LT 130 MM HG: CPT

## 2025-04-10 PROCEDURE — 99214 OFFICE O/P EST MOD 30 MIN: CPT | Mod: GC

## 2025-04-10 PROCEDURE — G2211 COMPLEX E/M VISIT ADD ON: HCPCS

## 2025-04-10 RX ORDER — PALIPERIDONE 3 MG/1
3 TABLET, EXTENDED RELEASE ORAL DAILY PRN
Qty: 30 TABLET | Refills: 1 | Status: SHIPPED | OUTPATIENT
Start: 2025-04-10

## 2025-04-10 ASSESSMENT — PAIN SCALES - GENERAL: PAINLEVEL_OUTOF10: NO PAIN (0)

## 2025-04-10 NOTE — NURSING NOTE
Chief Complaint   Patient presents with    Recheck Medication     Psychosis, unspecified psychosis type

## 2025-04-10 NOTE — PATIENT INSTRUCTIONS
Medication Changes:   -Can stop taking Vitamin D.   -I stopped the as needed Zyprexa and ordered Paliperidone 3mg daily as needed for any breakthrough psychosis symptoms.   -We will not renew your commitment.     Follow Up: 2 months -whenever next injection is scheduled.

## 2025-04-10 NOTE — PROGRESS NOTES
"   [facility]  MEDICAL PROGRESS NOTE  Psychosis Treatment Team       CARE TEAM:    Therapist- Family education program with Jose Ventura.   Other (family therapy) Jonna Murphy at [facility] in [place]   Legal: Civil Commitment . Type of commitment: MI  Date of initial commitment: 10/30/23, continued committed order 4/23/24, expires April 2025  Harris: Yes.   Medications authorized by Harris order: Haloperidol [HALDOL], Olanzapine [ZYPREXA], Paliperidone [INVEGA], and Risperidone [RISPERDAL].  Coy Martinez: No.   : Marisol: [phone]     Amie Santiago, who prefers being addressed as \"Irene\" is a 24 year old person who uses the pronouns she, her, hers.                   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 She was previously working with Dr. Soares on transitioning her from Olanzapine and Invega to Invega JAY, with the goal to ultimately get on Trinza. This is in the context of decompensating psychosis in 10/2023 resulting in [facility] admission and commitment with nelson (see pertinent background for more info). Irene has notably had a history of psychiatric hospitalizations prior to this as well. They have also been working on cutting down on her cannabis use, which may have been a contributing factor to her psychosis.     I first met with Irene on 10/3/2024, at this time she was doing well with Invega PO, low dose scheduled Olanzapine and prn olanzapine. Since this visit, we stopped the scheduled PO medications and started Invega Sustenna. She has been receiving the mainteinance 156mg doses. She has been doing well with " the injections, has not utilized her prn Olanzapine.  Per her , commitment expires 2025.      Today, Irene continues to report doing well overall. Mood continues to be euthymic and she denies recurrence of psychosis symptoms. Denies return to cannabis use. As Irene has been tolerating the Invega Sustenna and has had several doses to date,so we will plan to switch to Trinza today as previously discussed. We will plan to see Irene once more before her commitment is due to . Today, Irene endorsed that she would continue medications if not required as she recognizes the benefits for psychosis.     Irene does not appear to be at imminent risk of harming herself or anyone else at this visit.      Psychotropic Drug Interactions:  [PSYCHCLINICDDI]   Agents With Seizure Threshold Lowering Potential: Zyprexa, Invega  Management: limit med redundancy, routine monitoring, and patient aware of risks.     MNPMP was not checked today: will be checked next visit     Risk Statements:   Treatment Risk- Risks, benefits, alternatives and potential adverse effects have been discussed and are understood.   Safety Risk-Irene did not appear to be an imminent safety risk to self or others.    PLAN    *Commitment -last renewed in 2024.  Per : Marisol: [phone]: She is on commitment 2025. *    *Prescriptions sent electronically to [id number] - [place] [id number] AT [address] [phone]. Injections to [facility]. *     1) Medications:   -Start Invega Trinza 546 mg .   -Keep PRN Olanzapine 2.5-5mg daily as needed for breakthrough psychosis symptoms.   -Continue Vitamin D 50 mcg PO Qday.      2) Psychotherapy:   As indicated. Therapy with Jonna Murphy at [facility] in [place]. Hasn't seen in a few months. In the process of setting up an appointment here-booked out.      3) Next due:  Labs- Last obtained: 3/2024  EKG- Last collected 2024  (see data section below)   Rating scales- AIMS: 24, Score = 0. Next due      4) Referrals: none     5) Other: Monitor for commitment expiring-we will plan to have an appointment in the window of this 2025. It may have to be within 2-3 weeks.      6) Follow-up: 4/10/25.                  Interval History   Today:     -Things at [place].   -Working, working out, eating, sleeping, repeating.    -Just started this spring.    -Played Soccer. Talked about joining .    -Exercising a little 4 days a week.   -Going to [place]  in .   -Cabin in [facility].     -No other updates.       -Headache or migraine  went away  after a day or so.   -Nothing else that she's mental.     -No AVH, paranoia-  -Mood good.   -No cannabis use.   -Quit Nicotine two days ago. Feels like it's healthier.    -Vaping everyday.    -Hasn't felt any withdrawals.   -No lightheaded, regular bowel,   -Could reach out to     Plan:   -May 28th -check to see if she can get injection.   -Let commitment .   -D?c olanzapine and order PO invega 3mg.   -AIMS today 2025: 0  -Stop Vitamin D.       Last Visit:   -Got a new job at [place]. Likes it there. 20 hours.   -Things have been good at group home.   -Might go to [facility] on [address].   -Might go to [place], [place] for a friends graduation, grandparents.   -Has some friends that she sees.   -Saw mom yesterday.   - Mood: good.   -No periods of  highs or lows.   -No AVH, paranoia, feeling safe.   -Anxiety; No  -SI/Hi: No.     -No cannabis use.   -No alcohol  -Nothing else.     -Therapy: working on getting this set up.   -Side Effects:-No.   -No episodes  of lightheadedness-related to migraines before.   -Bowel Movements: Regularly  -No muscle stiffness or anything.     -Feeling good about getting the trinza.   -Would plan to continue medications even if medications weren't required. Recognizes that she had psychosis and medications have helped.       Current Social  "History:  -Working at [employer] part time.   -Housing: Has permanent housing. Adult Foster Care with [place] residents. Likes it there. 4 people live there.   -Interests: Draw, paint, listen to music, go on walks.       Pertinent Substance Use:    Updated this visit- no notable changes.     Alcohol: None since last last visit.   Cannabis use: No use since last visit. Mom will only let her use car if she abstains from cannabis use. Car is motivating for Irene.   Nicotine: Everyday- vaping. Disposable. Goes through disposable in two weeks. Has cut down. Not interested in MAT at this time.    Other substances :No      Psychiatric and Medical ROS per HPI                Pertinent Background  History of multiple psychiatric admissions. Last hospital admission was in 10/2023 at [facility] due to worsening psychotic symptoms and MI cilvil commitment with Harris order was pursued and granted. Ierne was discharged to an IRTS facility after this. She has notably had multiple ED visits historically (and before this admission) where she was found to be intoxicated on alcohol and with cannabis. Concern that cannabis may have been contributing to psychosis.      She has since been working with Dr. Soares, during which olanzapine was decreased and Invega initiated and dose slowsly increased with goal to get off olanzapine and eventually on to invega systenna and ultimately invega trinza JAY.      Please see Dr. Soares 5/23/2024 visit for further details on clinical course since Irene's discharge from [facility].  \"  Pertinent Items Include: Psychosis, aggression, substance use: cannabis and psych hosp, MI commitment with Harris order. \"                  Physical Exam  (Vitals Only)    BP 94/64   Pulse 71   Wt 56.1 kg (123 lb 9.6 oz)   BMI 22.61 kg/m    Pulse Readings from Last 3 Encounters:   04/10/25 71   03/06/25 68   02/06/25 98     Wt Readings from Last 3 Encounters:   04/10/25 56.1 kg (123 lb 9.6 oz)   03/06/25 54.9 kg (121 lb)   02/06/25 54.3 " "kg (119 lb 9.6 oz)     BP Readings from Last 3 Encounters:   04/10/25 94/64   03/06/25 89/60   02/06/25 (!) 86/59                      Mental Status Exam    Alertness: alert  and oriented  Appearance: well groomed  Behavior/Demeanor: pleasant, calm,, with appropriate eye contact.   Speech: normal and regular rate and rhythm  Language: intact and no obvious problem  Psychomotor: no psychomotor agitation, retardation or abnormal involuntary movements.   Mood:  \"good\"  Affect: full range for topics discussed, congruent to: mood- yes, content- yes; brightens appropriately.   Thought Process/Associations: linear, logical, goal directed   Thought Content:  Reports none;  Denies suicidal & violent ideation   Perception:  Reports none;  Denies auditory hallucinations, visual hallucinations, paranoia; does not appear to be responding to internal stimuli  Insight: adequate and fair  Judgment: good  Cognition: does  appear grossly intact; formal cognitive testing was not done  Gait and Station: unremarkable                 Past Psychotropic Medication Trials     Medication Max Dose (mg) Dates / Duration Helpful? DC Reason / Adverse Effects?   lexapro 20 1 year.Discont  May 2023  N Has not helped at all.   olanzapine  20 mg    Y Dose increased from 10 to 20 mg daily dose during Oct/2023 hospitalization    Prozac   2021   Somewhat helpful, but caused mood instability, vivid dreams    Wellbutrin  150 mg   Discontinued October 2023  N                             Past Medical History     Patient Active Problem List   Diagnosis    Adopted 2/07 from [facility]    ADHD, predominantly inattentive type    Seasonal allergic rhinitis    Hearing deficit, left    Low ferritin    Acute nonintractable headache, unspecified headache type    Major depressive disorder with single episode, in full remission    Discoloration of eye    Concussion without loss of consciousness, initial encounter    Recurrent major depressive disorder "                     Medications     Current Outpatient Medications   Medication Sig Dispense Refill    OLANZapine (ZYPREXA) 2.5 MG tablet Take 1-2 tablets (2.5-5 mg) by mouth daily as needed (for breakthrough hallucinations and anxiety associated with hallucinations.). 60 tablet 1    SUMAtriptan (IMITREX) 50 MG tablet Take 1 tablet (50 mg) by mouth at onset of headache for migraine. May repeat in 2 hours. Max 8 tablets/24 hours. 9 tablet 5    Vitamin D3 (CHOLECALCIFEROL) 25 mcg (1000 units) tablet Take 1 tablet (25 mcg) by mouth daily. 30 tablet 2                     Data         3/7/2024     2:25 PM 10/3/2024     8:36 AM 2025    10:15 AM   PROMIS-10 Total Score w/o Sub Scores   PROMIS TOTAL - SUBSCORES 34 36 38        Proxy-reported         2024    10:19 AM 2025    10:13 AM 2025     1:19 PM   PHQ-9 SCORE   PHQ-9 Total Score MyChart 0 0 0   PHQ-9 Total Score 0  0  0        Patient-reported    Proxy-reported         2019     1:42 PM 2021     3:30 PM   DAMIÁN-7 SCORE   Total Score 1 0       Liver/Kidney Function, TSH Metabolic Blood counts   Recent Labs   Lab Test 25  0910 24  1736   AST 21  --    ALT 11  --    ALKPHOS 68  --    CR 0.68 0.71     Recent Labs   Lab Test 24  1736   TSH 1.32    Recent Labs   Lab Test 25  0910   CHOL 126   TRIG 37   LDL 72   HDL 47*     Recent Labs   Lab Test 25  0910   A1C 5.2     Recent Labs   Lab Test 25  0910   GLC 95    Recent Labs   Lab Test 25  0910   WBC 4.3   HGB 12.7   HCT 38.1   MCV 88            EK2024: Qtc: 421     Level of Medical Decision Making:   - At least 1 chronic problem that is not stable  - Engaged in prescription drug management during visit (discussed any medication benefits, side effects, alternatives, etc.)       The longitudinal plan of care for the diagnosis(es)/condition(s) as documented were addressed during this visit. Due to the added complexity in care, I will continue to  support Irene in the subsequent management and with ongoing continuity of care.      PROVIDER:   Ariadna Gamez MD  PGY-3 Psychiatry  [facility]     Patient staffed in clinic with Dr. Huber who will sign the note.  Supervisor is Dr. Huber.

## 2025-04-22 ENCOUNTER — TELEPHONE (OUTPATIENT)
Dept: NEUROLOGY | Facility: CLINIC | Age: 25
End: 2025-04-22
Payer: COMMERCIAL

## 2025-04-22 NOTE — TELEPHONE ENCOUNTER
Attempted to reach patient to remind them about appointment scheduled with Sarah He DO on 4/23/25 in our [place] clinic.    A voicemail was left with a call back number if the patient has questions or would like to reschedule.

## 2025-05-27 NOTE — PROGRESS NOTES
Writer called the patient to remind them of their JAY appointment on 5/28 at 0800. Patient confirmed appointment.

## 2025-05-28 ENCOUNTER — ALLIED HEALTH/NURSE VISIT (OUTPATIENT)
Dept: PSYCHIATRY | Facility: CLINIC | Age: 25
End: 2025-05-28
Payer: COMMERCIAL

## 2025-05-28 VITALS
DIASTOLIC BLOOD PRESSURE: 55 MMHG | HEART RATE: 77 BPM | SYSTOLIC BLOOD PRESSURE: 84 MMHG | WEIGHT: 121 LBS | BODY MASS INDEX: 22.13 KG/M2

## 2025-05-28 DIAGNOSIS — F29 PSYCHOSIS, UNSPECIFIED PSYCHOSIS TYPE (H): Primary | ICD-10-CM

## 2025-05-28 PROCEDURE — 3078F DIAST BP <80 MM HG: CPT

## 2025-05-28 PROCEDURE — 1126F AMNT PAIN NOTED NONE PRSNT: CPT

## 2025-05-28 PROCEDURE — 96372 THER/PROPH/DIAG INJ SC/IM: CPT | Performed by: PSYCHIATRY & NEUROLOGY

## 2025-05-28 PROCEDURE — 250N000012 HC RX MED GY IP 250 OP 636 PS 637: Mod: JZ | Performed by: PSYCHIATRY & NEUROLOGY

## 2025-05-28 PROCEDURE — 3074F SYST BP LT 130 MM HG: CPT

## 2025-05-28 RX ADMIN — PALIPERIDONE PALMITATE 546 MG: 546 INJECTION, SUSPENSION, EXTENDED RELEASE INTRAMUSCULAR at 08:01

## 2025-05-28 ASSESSMENT — PAIN SCALES - GENERAL: PAINLEVEL_OUTOF10: NO PAIN (0)

## 2025-05-28 NOTE — NURSING NOTE
"Clinic Administered Medication Documentation      Injectable Medication Documentation    Is there an active order (written within the past 365 days, with administrations remaining, not ) in the chart? Yes.     Patient was given Invega Sustenna 546 mg. Prior to medication administration, verified patient's identity using patient s name and date of birth. Please see MAR and medication order for additional information. Patient instructed to remain in clinic for 15 minutes and report any adverse reaction to staff immediately.    Vial/Syringe: Syringe  Was this medication supplied by the patient? No  Is this a medication the patient will need to receive again? Yes. Verified that the patient has refills remaining in their prescription.        OBSERVATIONS:  1.  Appearance: Casually dressed  2.  Mood: Patient reports she is doing \"pretty good.\" Denies breakthrough symptoms since last injection. Patient reports she experienced a \"headache after the last injection, around 2 pm the same day as the injection.\" Patient reports she recently quit smoking and vaping about 1 month ago.   3.  Affect: full range  4.  Attitude: pleasant and cooperative  5.  Cooperation: Participated voluntarily  6.  Safety denies any current safety concerns including suicidal ideation, self-harm, and homicidal ideation  7.  Side Effects: none              "

## 2025-06-12 ENCOUNTER — OFFICE VISIT (OUTPATIENT)
Dept: PSYCHIATRY | Facility: CLINIC | Age: 25
End: 2025-06-12
Attending: STUDENT IN AN ORGANIZED HEALTH CARE EDUCATION/TRAINING PROGRAM
Payer: COMMERCIAL

## 2025-06-12 VITALS
SYSTOLIC BLOOD PRESSURE: 104 MMHG | BODY MASS INDEX: 22.35 KG/M2 | DIASTOLIC BLOOD PRESSURE: 70 MMHG | HEART RATE: 92 BPM | TEMPERATURE: 98 F | WEIGHT: 122.2 LBS

## 2025-06-12 DIAGNOSIS — Z79.899 LONG TERM CURRENT USE OF ANTIPSYCHOTIC MEDICATION: ICD-10-CM

## 2025-06-12 DIAGNOSIS — F29 PSYCHOSIS, UNSPECIFIED PSYCHOSIS TYPE (H): Primary | ICD-10-CM

## 2025-06-12 DIAGNOSIS — F33.42 MAJOR DEPRESSIVE DISORDER, RECURRENT EPISODE, IN FULL REMISSION: ICD-10-CM

## 2025-06-12 DIAGNOSIS — F12.91 CANNABIS USE DISORDER IN REMISSION: ICD-10-CM

## 2025-06-12 PROCEDURE — 3074F SYST BP LT 130 MM HG: CPT

## 2025-06-12 PROCEDURE — 99214 OFFICE O/P EST MOD 30 MIN: CPT | Mod: GC

## 2025-06-12 PROCEDURE — 1126F AMNT PAIN NOTED NONE PRSNT: CPT

## 2025-06-12 PROCEDURE — G0463 HOSPITAL OUTPT CLINIC VISIT: HCPCS

## 2025-06-12 PROCEDURE — G2211 COMPLEX E/M VISIT ADD ON: HCPCS

## 2025-06-12 PROCEDURE — 3078F DIAST BP <80 MM HG: CPT

## 2025-06-12 ASSESSMENT — PAIN SCALES - GENERAL: PAINLEVEL_OUTOF10: NO PAIN (0)

## 2025-06-12 NOTE — NURSING NOTE
Chief Complaint   Patient presents with    Recheck Medication     Psychosis, unspecified psychosis type     - Alberto DICKSON, Visit Facilitator

## 2025-06-12 NOTE — PROGRESS NOTES
"   [facility]  MEDICAL PROGRESS NOTE  Psychosis Treatment Team       CARE TEAM:    Therapist- Family education program with Jose Ventura.   Other (family therapy) Jonna Murphy at [facility] in [place]   Legal: Civil Commitment . Type of commitment: MI  Date of initial commitment: 10/30/23, continued committed order 4/23/24, expires April 2025  Harris: Yes.   Medications authorized by Harris order: Haloperidol [HALDOL], Olanzapine [ZYPREXA], Paliperidone [INVEGA], and Risperidone [RISPERDAL].  Coy Martinez: No.   : Marisol: [phone]     Amie Santiago, who prefers being addressed as \"Irene\" is a 24 year old person who uses the pronouns she, her, hers.                   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 Irene had an episode decompensating psychosis in 10/2023 [place] [facility] with harris (see pertinent background for more info). Irene has notably had a history of psychiatric hospitalizations prior to this as well. Cannabis use may have contributed to her psychosis and since I have been working with Irene, she has not had any return to use.     I first met with Irene on 10/3/2024, at which time she was doing well with Invega PO, low dose scheduled Olanzapine and prn olanzapine. Since this visit, we stopped the scheduled PO medications and started Invega Sustenna . Given her adherence and tolerance of this, we recently transitioned to Trinza, first injection 3/6/25,      Today, Irene continues to report doing well overall. Mood continues to be euthymic and she denies recurrence of psychosis " symptoms. Denies return to cannabis use and actually endorsed stopping Nicotine use a couple days prior to this visit. She has also started exercising and is very motivated to address her health and wellness. Initially after receiving her first Trinza injection, she endorsed a migraine but she has not had any since. Irene's commitment is due to  this month. At our last visit and today, Irene agreed that she would continue her medications if not required and recognizes the benefits for psychosis. Given these reports and Irene's behaviros with mental health recommendations and with stopping cannabis , we will not pursue recommitment at this time.        Psychotropic Drug Interactions:  [PSYCHCLINICDDI]   Agents With Seizure Threshold Lowering Potential: Zyprexa, Invega  Management: limit med redundancy, routine monitoring, and patient aware of risks.     MNPMP was not checked today: will be checked next visit     Risk Statements:   Treatment Risk- Risks, benefits, alternatives and potential adverse effects have been discussed and are understood.   Safety Risk-Irene did not appear to be an imminent safety risk to self or others.    PLAN    *Commitment -last renewed in 2024.  Per : Marisol: [phone]: She is on commitment 2025. *    *Prescriptions sent electronically to [id number] - [address] AT [address] [phone]. Injections to [facility]. *     1) Medications:   -Continue Invega Trinza 546 mg .   -Stop PRN Olanzapine 2.5-5mg daily   -Start PO Invega 3mg daily prn for breakthrough psychosis symptoms.   -Stop Vitamin D supplementation  -Continue Vitamin D 50 mcg PO Qday.      2) Psychotherapy:   As indicated. Therapy with Jonna Murphy at [facility] in [place]. Hasn't seen in a few months. In the process of setting up an appointment here-booked out.      3) Next due:  Labs- Last obtained: 3/2024  EKG- Last collected  9/2024 (see data section below)   Rating scales- AIMS: completed today 4/10/2025     4) Referrals: none     5) Other: We will not pursue recommitment at this time-see A/P.       6) Follow-up: 2 months-at time of next injection.                  Interval History     -Has another job interview today. Getting a little  bored at job.   -Might go and play soccer with a friend.   -Went to [place] .   -Might go to [employer] late July.     -Trinza going well, no recurrence of symptoms.   -Mood stable.   -No AVH or paranoia   -No migraines  -no side effects    -No ightheaded epsiodes   -Regular bowel movements  -No EPS, akithisia      Substance:  -No nicotine or cannabis use   -Alcohol once every few months.     -Not interested in therapy     Medications:  -Hasn't needed prn   -no med changes          Last Viisit:  -Working, working out, eating, sleeping, repeating.   -Just started this spring.    -Played Soccer. Talked about joining .    -Exercising a little 4 days a week.   -Going to [place]  in June.   -Cabin in [facility].   -No other updates.     Migraine following injection went away after a day or so. None since.   -No AVH, paranoia-  -Mood good.   -No lightheaded, regular bowel,     -Discussed commitment renewal. Irene again said that she would plan to continue medications even if medications weren't required. She could reach out for help if she needed to, symptoms recurring including breakthrough symptoms for psychosis. We agreed to not renew the commitment.     Current Social History:  -Working at [employer] part time.   -Housing: Has permanent housing. Adult Foster Care with [facility] residents. Likes it there. 4 people live there.   -Interests: Draw, paint, listen to music, go on walks.       Pertinent Substance Use:    Updated this visit-see bold.     Alcohol: None since last last visit.   Cannabis use: No return to use. Mom will only let her use car if she abstains from cannabis use. Car is motivating  "for Irene.   Nicotine:    -Today: 4/10/25: Quit Nicotine two days ago. Feels like it's healthier. Hasn't felt any withdrawals.    -Previously: Everyday- vaping. Disposable. Goes through disposable in two weeks. Has cut down. Not interested in MAT at this time.    Other substances :No      Psychiatric and Medical ROS per HPI                Pertinent Background  History of multiple psychiatric admissions. Last hospital [place] [facility] [place] to worsening psychotic symptoms and MI cilvil commitment with Harris order was pursued and granted. Irene was discharged to an IRTS facility after this. She has notably had multiple ED visits historically (and before this admission) where she was found to be intoxicated on alcohol and with cannabis. Concern that cannabis may have been contributing to psychosis.      She has since been working with Dr. Soares, during which olanzapine was decreased and Invega initiated and dose slowsly increased with goal to get off olanzapine and eventually on to invega systenna and ultimately invega trinza JAY.      Please see Dr. Soares 5/23/2024 visit for further details on clinical course since Irene[place] [facility].  \"  Pertinent Items Include: Psychosis, aggression, substance use: cannabis and psych hosp, MI commitment with Harris order. \"                  Physical Exam  (Vitals Only)    /70   Pulse 92   Temp 98  F (36.7  C)   Wt 55.4 kg (122 lb 3.2 oz)   BMI 22.35 kg/m    Pulse Readings from Last 3 Encounters:   06/12/25 92   05/28/25 77   04/10/25 71     Wt Readings from Last 3 Encounters:   06/12/25 55.4 kg (122 lb 3.2 oz)   05/28/25 54.9 kg (121 lb)   04/10/25 56.1 kg (123 lb 9.6 oz)     BP Readings from Last 3 Encounters:   06/12/25 104/70   05/28/25 (!) 84/55   04/10/25 94/64                      Mental Status Exam    Alertness: alert  and oriented  Appearance: well groomed  Behavior/Demeanor: pleasant, calm,, with appropriate eye contact.   Speech: normal and " "regular rate and rhythm  Language: intact and no obvious problem  Psychomotor: no psychomotor agitation, retardation or abnormal involuntary movements. See AIMs below.  Mood:  \"good\"  Affect: full range for topics discussed, congruent to: mood- yes, content- yes; brightens appropriately. -smiling at times  Thought Process/Associations: linear, logical, goal directed   Thought Content:  Reports none;  Denies suicidal & violent ideation   Perception:  Reports none;  Denies auditory hallucinations, visual hallucinations, paranoia; does not appear to be responding to internal stimuli  Insight: adequate and fair  Judgment: very good-quit nicotine, no return to use for cannabis, working, adhering to mental health cares  Cognition: does  appear grossly intact; formal cognitive testing was not done  Gait and Station: unremarkable                 Past Psychotropic Medication Trials     Medication Max Dose (mg) Dates / Duration Helpful? DC Reason / Adverse Effects?   lexapro 20 1 year.Discont  May 2023  N Has not helped at all.   olanzapine  20 mg    Y Dose increased from 10 to 20 mg daily dose during Oct/2023 hospitalization    Prozac   2021   Somewhat helpful, but caused mood instability, vivid dreams    Wellbutrin  150 mg   Discontinued October 2023  N                             Past Medical History     Patient Active Problem List   Diagnosis    [facility]    ADHD, predominantly inattentive type    Seasonal allergic rhinitis    Hearing deficit, left    Low ferritin    Acute nonintractable headache, unspecified headache type    Major depressive disorder with single episode, in full remission    Discoloration of eye    Concussion without loss of consciousness, initial encounter    Recurrent major depressive disorder    Other psychoactive substance use, unspecified with psychoactive substance-induced psychotic disorder, unspecified (H)                     Medications     Current Outpatient Medications "   Medication Sig Dispense Refill    paliperidone ER (INVEGA) 3 MG 24 hr tablet Take 1 tablet (3 mg) by mouth daily as needed (breakthrough psychosis symptoms). 30 tablet 1    SUMAtriptan (IMITREX) 50 MG tablet Take 1 tablet (50 mg) by mouth at onset of headache for migraine. May repeat in 2 hours. Max 8 tablets/24 hours. 9 tablet 5                     Data         10/3/2024     8:36 AM 2025    10:15 AM 2025     9:43 AM   PROMIS-10 Total Score w/o Sub Scores   PROMIS TOTAL - SUBSCORES 36 38  35        Patient-reported    Proxy-reported         2025    10:13 AM 2025     1:19 PM 2025     9:42 AM   PHQ-9 SCORE   PHQ-9 Total Score MyChart 0 0 0   PHQ-9 Total Score 0  0  0        Patient-reported    Proxy-reported         2019     1:42 PM 2021     3:30 PM   DAMIÁN-7 SCORE   Total Score 1 0       Liver/Kidney Function, TSH Metabolic Blood counts   Recent Labs   Lab Test 25  0910 24  1736   AST 21  --    ALT 11  --    ALKPHOS 68  --    CR 0.68 0.71     Recent Labs   Lab Test 24  1736   TSH 1.32    Recent Labs   Lab Test 25  0910   CHOL 126   TRIG 37   LDL 72   HDL 47*     Recent Labs   Lab Test 25  0910   A1C 5.2     Recent Labs   Lab Test 25  0910   GLC 95    Recent Labs   Lab Test 25  0910   WBC 4.3   HGB 12.7   HCT 38.1   MCV 88            EK2024: Qtc: 421  -AIMS today 2025: 0     Level of Medical Decision Making:   - At least 1 chronic problem that is not stable  - Engaged in prescription drug management during visit (discussed any medication benefits, side effects, alternatives, etc.)       The longitudinal plan of care for the diagnosis(es)/condition(s) as documented were addressed during this visit. Due to the added complexity in care, I will continue to support Irene in the subsequent management and with ongoing continuity of care.      PROVIDER:   Ariadna Gamez MD  PGY-3 Psychiatry  [facility]     Patient  staffed in clinic with Dr. Huber who will sign the note.  Supervisor is Dr. Huber.

## 2025-08-21 ENCOUNTER — ALLIED HEALTH/NURSE VISIT (OUTPATIENT)
Dept: PSYCHIATRY | Facility: CLINIC | Age: 25
End: 2025-08-21
Payer: COMMERCIAL

## 2025-08-21 VITALS
BODY MASS INDEX: 22.72 KG/M2 | DIASTOLIC BLOOD PRESSURE: 56 MMHG | SYSTOLIC BLOOD PRESSURE: 93 MMHG | TEMPERATURE: 97.8 F | HEART RATE: 73 BPM | WEIGHT: 124.2 LBS

## 2025-08-21 DIAGNOSIS — F29 PSYCHOSIS, UNSPECIFIED PSYCHOSIS TYPE (H): Primary | ICD-10-CM

## 2025-08-21 PROCEDURE — 96372 THER/PROPH/DIAG INJ SC/IM: CPT | Performed by: PSYCHIATRY & NEUROLOGY

## 2025-08-21 RX ADMIN — PALIPERIDONE PALMITATE 546 MG: 546 INJECTION, SUSPENSION, EXTENDED RELEASE INTRAMUSCULAR at 08:41

## 2025-08-21 ASSESSMENT — PAIN SCALES - GENERAL: PAINLEVEL_OUTOF10: NO PAIN (0)

## 2025-08-28 ENCOUNTER — OFFICE VISIT (OUTPATIENT)
Dept: PSYCHIATRY | Facility: CLINIC | Age: 25
End: 2025-08-28
Attending: STUDENT IN AN ORGANIZED HEALTH CARE EDUCATION/TRAINING PROGRAM
Payer: COMMERCIAL

## 2025-08-28 VITALS
BODY MASS INDEX: 22.86 KG/M2 | TEMPERATURE: 97.8 F | DIASTOLIC BLOOD PRESSURE: 68 MMHG | WEIGHT: 125 LBS | HEART RATE: 84 BPM | SYSTOLIC BLOOD PRESSURE: 93 MMHG

## 2025-08-28 ASSESSMENT — PAIN SCALES - GENERAL: PAINLEVEL_OUTOF10: NO PAIN (0)
